# Patient Record
Sex: MALE | Race: WHITE | NOT HISPANIC OR LATINO | Employment: OTHER | URBAN - METROPOLITAN AREA
[De-identification: names, ages, dates, MRNs, and addresses within clinical notes are randomized per-mention and may not be internally consistent; named-entity substitution may affect disease eponyms.]

---

## 2017-06-12 ENCOUNTER — HOSPITAL ENCOUNTER (OUTPATIENT)
Dept: PAIN MEDICINE | Facility: HOSPITAL | Age: 72
Discharge: HOME | End: 2017-06-12
Attending: PAIN MEDICINE | Admitting: PAIN MEDICINE

## 2017-06-26 ENCOUNTER — HOSPITAL ENCOUNTER (OUTPATIENT)
Dept: PAIN MEDICINE | Facility: HOSPITAL | Age: 72
Discharge: HOME | End: 2017-06-26
Attending: PAIN MEDICINE | Admitting: PAIN MEDICINE

## 2017-10-16 ENCOUNTER — HOSPITAL ENCOUNTER (OUTPATIENT)
Dept: PAIN MEDICINE | Facility: HOSPITAL | Age: 72
Discharge: HOME | End: 2017-10-16
Attending: PAIN MEDICINE | Admitting: PAIN MEDICINE

## 2018-01-16 ENCOUNTER — ON CAMPUS - OUTPATIENT (OUTPATIENT)
Dept: URBAN - METROPOLITAN AREA HOSPITAL 85 | Facility: HOSPITAL | Age: 73
End: 2018-01-16

## 2018-01-16 DIAGNOSIS — K29.60 OTHER GASTRITIS WITHOUT BLEEDING: ICD-10-CM

## 2018-01-16 DIAGNOSIS — R06.00 DYSPNEA, UNSPECIFIED: ICD-10-CM

## 2018-01-16 DIAGNOSIS — R07.9 CHEST PAIN, UNSPECIFIED: ICD-10-CM

## 2018-01-16 DIAGNOSIS — K44.9 DIAPHRAGMATIC HERNIA WITHOUT OBSTRUCTION OR GANGRENE: ICD-10-CM

## 2018-01-16 PROCEDURE — 43239 EGD BIOPSY SINGLE/MULTIPLE: CPT | Performed by: INTERNAL MEDICINE

## 2019-01-24 ENCOUNTER — OFFICE (OUTPATIENT)
Dept: URBAN - METROPOLITAN AREA CLINIC 64 | Facility: CLINIC | Age: 74
End: 2019-01-24

## 2019-01-24 VITALS
WEIGHT: 238 LBS | HEART RATE: 52 BPM | DIASTOLIC BLOOD PRESSURE: 81 MMHG | SYSTOLIC BLOOD PRESSURE: 131 MMHG | HEIGHT: 72 IN

## 2019-01-24 DIAGNOSIS — K44.9 DIAPHRAGMATIC HERNIA WITHOUT OBSTRUCTION OR GANGRENE: ICD-10-CM

## 2019-01-24 DIAGNOSIS — Z79.02 LONG TERM (CURRENT) USE OF ANTITHROMBOTICS/ANTIPLATELETS: ICD-10-CM

## 2019-01-24 DIAGNOSIS — Z12.11 ENCOUNTER FOR SCREENING FOR MALIGNANT NEOPLASM OF COLON: ICD-10-CM

## 2019-01-24 DIAGNOSIS — K21.9 GASTRO-ESOPHAGEAL REFLUX DISEASE WITHOUT ESOPHAGITIS: ICD-10-CM

## 2019-01-24 PROCEDURE — 99214 OFFICE O/P EST MOD 30 MIN: CPT | Performed by: NURSE PRACTITIONER

## 2019-01-24 RX ORDER — PANTOPRAZOLE SODIUM 20 MG/1
20 TABLET, DELAYED RELEASE ORAL
Qty: 90 | Refills: 3 | Status: COMPLETED
Start: 2019-01-24 | End: 2021-03-08

## 2019-01-24 RX ORDER — RANITIDINE HYDROCHLORIDE 300 MG/1
300 TABLET, FILM COATED ORAL
Qty: 30 | Refills: 11 | Status: COMPLETED
Start: 2019-01-24 | End: 2019-06-05

## 2019-03-06 ENCOUNTER — OFFICE (OUTPATIENT)
Dept: URBAN - METROPOLITAN AREA CLINIC 64 | Facility: CLINIC | Age: 74
End: 2019-03-06

## 2019-03-06 VITALS
DIASTOLIC BLOOD PRESSURE: 89 MMHG | HEART RATE: 68 BPM | WEIGHT: 242 LBS | HEIGHT: 72 IN | SYSTOLIC BLOOD PRESSURE: 160 MMHG

## 2019-03-06 DIAGNOSIS — Z79.02 LONG TERM (CURRENT) USE OF ANTITHROMBOTICS/ANTIPLATELETS: ICD-10-CM

## 2019-03-06 DIAGNOSIS — K21.9 GASTRO-ESOPHAGEAL REFLUX DISEASE WITHOUT ESOPHAGITIS: ICD-10-CM

## 2019-03-06 DIAGNOSIS — Z12.11 ENCOUNTER FOR SCREENING FOR MALIGNANT NEOPLASM OF COLON: ICD-10-CM

## 2019-03-06 PROCEDURE — 99213 OFFICE O/P EST LOW 20 MIN: CPT | Performed by: NURSE PRACTITIONER

## 2019-05-14 ENCOUNTER — ON CAMPUS - OUTPATIENT (OUTPATIENT)
Dept: URBAN - METROPOLITAN AREA HOSPITAL 85 | Facility: HOSPITAL | Age: 74
End: 2019-05-14

## 2019-05-14 ENCOUNTER — HOSPITAL ENCOUNTER (OUTPATIENT)
Dept: GASTROENTEROLOGY | Facility: HOSPITAL | Age: 74
Setting detail: HOSPITAL OUTPATIENT SURGERY
Discharge: HOME | End: 2019-05-14
Attending: INTERNAL MEDICINE | Admitting: INTERNAL MEDICINE

## 2019-05-14 DIAGNOSIS — D12.2 BENIGN NEOPLASM OF ASCENDING COLON: ICD-10-CM

## 2019-05-14 DIAGNOSIS — R19.5 OTHER FECAL ABNORMALITIES: ICD-10-CM

## 2019-05-14 DIAGNOSIS — K57.30 DIVERTICULOSIS OF LARGE INTESTINE WITHOUT PERFORATION OR ABS: ICD-10-CM

## 2019-05-14 DIAGNOSIS — D12.3 BENIGN NEOPLASM OF TRANSVERSE COLON: ICD-10-CM

## 2019-05-14 DIAGNOSIS — K64.0 FIRST DEGREE HEMORRHOIDS: ICD-10-CM

## 2019-05-14 PROCEDURE — 45380 COLONOSCOPY AND BIOPSY: CPT | Mod: 59 | Performed by: INTERNAL MEDICINE

## 2019-05-14 PROCEDURE — 45385 COLONOSCOPY W/LESION REMOVAL: CPT | Performed by: INTERNAL MEDICINE

## 2019-06-05 ENCOUNTER — OFFICE (OUTPATIENT)
Dept: URBAN - METROPOLITAN AREA CLINIC 64 | Facility: CLINIC | Age: 74
End: 2019-06-05

## 2019-06-05 VITALS
SYSTOLIC BLOOD PRESSURE: 128 MMHG | HEART RATE: 49 BPM | DIASTOLIC BLOOD PRESSURE: 77 MMHG | HEIGHT: 72 IN | WEIGHT: 240 LBS

## 2019-06-05 DIAGNOSIS — Z86.010 PERSONAL HISTORY OF COLONIC POLYPS: ICD-10-CM

## 2019-06-05 DIAGNOSIS — K21.9 GASTRO-ESOPHAGEAL REFLUX DISEASE WITHOUT ESOPHAGITIS: ICD-10-CM

## 2019-06-05 PROCEDURE — 99213 OFFICE O/P EST LOW 20 MIN: CPT | Performed by: NURSE PRACTITIONER

## 2019-06-05 RX ORDER — RANITIDINE HYDROCHLORIDE 300 MG/1
300 TABLET, FILM COATED ORAL
Qty: 30 | Refills: 11 | Status: COMPLETED
Start: 2019-01-24 | End: 2019-06-05

## 2019-06-17 ENCOUNTER — HOSPITAL ENCOUNTER (OUTPATIENT)
Facility: HOSPITAL | Age: 74
Setting detail: OBSERVATION
Discharge: HOME | End: 2019-06-18
Attending: PHYSICIAN ASSISTANT | Admitting: HOSPITALIST

## 2019-06-17 ENCOUNTER — HOSPITAL ENCOUNTER (EMERGENCY)
Dept: GENERAL RADIOLOGY | Facility: HOSPITAL | Age: 74
Discharge: HOME | End: 2019-06-17

## 2019-06-17 DIAGNOSIS — R07.9 CHEST PAIN, UNSPECIFIED TYPE: Primary | ICD-10-CM

## 2019-06-17 LAB
ALBUMIN SERPL-MCNC: 4 G/DL (ref 3.5–4.8)
ALBUMIN/GLOB SERPL: 1.2 G/DL (ref 1–1.7)
ALP SERPL-CCNC: 61 U/L (ref 32–91)
ALT SERPL W P-5'-P-CCNC: 19 U/L (ref 17–63)
ANION GAP SERPL CALCULATED.3IONS-SCNC: 11 MMOL/L (ref 10–20)
AST SERPL-CCNC: 27 U/L (ref 15–41)
BASOPHILS # BLD AUTO: 0.1 10*3/MM3 (ref 0–0.2)
BASOPHILS NFR BLD AUTO: 1.6 % (ref 0–1.5)
BILIRUB SERPL-MCNC: 0.8 MG/DL (ref 0.3–1.2)
BUN BLD-MCNC: 12 MG/DL (ref 8–20)
BUN/CREAT SERPL: 12 (ref 6.2–20.3)
CALCIUM SPEC-SCNC: 8.8 MG/DL (ref 8.9–10.3)
CHLORIDE SERPL-SCNC: 106 MMOL/L (ref 101–111)
CO2 SERPL-SCNC: 21 MMOL/L (ref 22–32)
CREAT BLD-MCNC: 1 MG/DL (ref 0.7–1.2)
DEPRECATED RDW RBC AUTO: 43.3 FL (ref 37–54)
EOSINOPHIL # BLD AUTO: 0.2 10*3/MM3 (ref 0–0.4)
EOSINOPHIL NFR BLD AUTO: 4.8 % (ref 0.3–6.2)
ERYTHROCYTE [DISTWIDTH] IN BLOOD BY AUTOMATED COUNT: 13.8 % (ref 12.3–15.4)
GFR SERPL CREATININE-BSD FRML MDRD: 74 ML/MIN/1.73
GLOBULIN UR ELPH-MCNC: 3.3 GM/DL (ref 2.5–3.8)
GLUCOSE BLD-MCNC: 94 MG/DL (ref 65–99)
HCT VFR BLD AUTO: 49.7 % (ref 37.5–51)
HGB BLD-MCNC: 16.4 G/DL (ref 13–17.7)
HOLD SPECIMEN: NORMAL
HOLD SPECIMEN: NORMAL
LYMPHOCYTES # BLD AUTO: 1.3 10*3/MM3 (ref 0.7–3.1)
LYMPHOCYTES NFR BLD AUTO: 30.1 % (ref 19.6–45.3)
MCH RBC QN AUTO: 30.1 PG (ref 26.6–33)
MCHC RBC AUTO-ENTMCNC: 33.1 G/DL (ref 31.5–35.7)
MCV RBC AUTO: 91.1 FL (ref 79–97)
MONOCYTES # BLD AUTO: 0.5 10*3/MM3 (ref 0.1–0.9)
MONOCYTES NFR BLD AUTO: 12.3 % (ref 5–12)
NEUTROPHILS # BLD AUTO: 2.3 10*3/MM3 (ref 1.7–7)
NEUTROPHILS NFR BLD AUTO: 51.2 % (ref 42.7–76)
NRBC BLD AUTO-RTO: 0.2 /100 WBC (ref 0–0.2)
PLATELET # BLD AUTO: 178 10*3/MM3 (ref 140–450)
PMV BLD AUTO: 9.4 FL (ref 6–12)
POTASSIUM BLD-SCNC: 4.4 MMOL/L (ref 3.6–5.1)
PROT SERPL-MCNC: 7.3 G/DL (ref 6.1–7.9)
RBC # BLD AUTO: 5.46 10*6/MM3 (ref 4.14–5.8)
SODIUM BLD-SCNC: 138 MMOL/L (ref 136–144)
TROPONIN I SERPL-MCNC: <0.03 NG/ML (ref 0–0.03)
TROPONIN I SERPL-MCNC: <0.03 NG/ML (ref 0–0.03)
WBC NRBC COR # BLD: 4.4 10*3/MM3 (ref 3.4–10.8)
WHOLE BLOOD HOLD SPECIMEN: NORMAL
WHOLE BLOOD HOLD SPECIMEN: NORMAL

## 2019-06-17 PROCEDURE — 84484 ASSAY OF TROPONIN QUANT: CPT | Performed by: PHYSICIAN ASSISTANT

## 2019-06-17 PROCEDURE — 25010000002 ENOXAPARIN PER 10 MG: Performed by: NURSE PRACTITIONER

## 2019-06-17 PROCEDURE — G0378 HOSPITAL OBSERVATION PER HR: HCPCS

## 2019-06-17 PROCEDURE — 96372 THER/PROPH/DIAG INJ SC/IM: CPT

## 2019-06-17 PROCEDURE — 93005 ELECTROCARDIOGRAM TRACING: CPT

## 2019-06-17 PROCEDURE — 99219 PR INITIAL OBSERVATION CARE/DAY 50 MINUTES: CPT | Performed by: NURSE PRACTITIONER

## 2019-06-17 PROCEDURE — 93005 ELECTROCARDIOGRAM TRACING: CPT | Performed by: PHYSICIAN ASSISTANT

## 2019-06-17 PROCEDURE — 99284 EMERGENCY DEPT VISIT MOD MDM: CPT

## 2019-06-17 PROCEDURE — 85025 COMPLETE CBC W/AUTO DIFF WBC: CPT | Performed by: PHYSICIAN ASSISTANT

## 2019-06-17 PROCEDURE — 80053 COMPREHEN METABOLIC PANEL: CPT | Performed by: PHYSICIAN ASSISTANT

## 2019-06-17 PROCEDURE — 71045 X-RAY EXAM CHEST 1 VIEW: CPT

## 2019-06-17 RX ORDER — LISINOPRIL 5 MG/1
TABLET ORAL EVERY 24 HOURS
COMMUNITY
Start: 2018-05-31 | End: 2019-10-09 | Stop reason: SDUPTHER

## 2019-06-17 RX ORDER — ASPIRIN 81 MG/1
81 TABLET ORAL DAILY
Status: DISCONTINUED | OUTPATIENT
Start: 2019-06-18 | End: 2019-06-18

## 2019-06-17 RX ORDER — FAMOTIDINE 20 MG/1
TABLET, FILM COATED ORAL EVERY 24 HOURS
COMMUNITY
Start: 2018-07-05 | End: 2019-06-17

## 2019-06-17 RX ORDER — ACETAMINOPHEN,DIPHENHYDRAMINE HCL 500; 25 MG/1; MG/1
2 TABLET, FILM COATED ORAL NIGHTLY
COMMUNITY
Start: 2014-01-10

## 2019-06-17 RX ORDER — SODIUM CHLORIDE 0.9 % (FLUSH) 0.9 %
3 SYRINGE (ML) INJECTION EVERY 12 HOURS SCHEDULED
Status: DISCONTINUED | OUTPATIENT
Start: 2019-06-17 | End: 2019-06-18 | Stop reason: HOSPADM

## 2019-06-17 RX ORDER — ROSUVASTATIN CALCIUM 40 MG/1
TABLET, COATED ORAL NIGHTLY
COMMUNITY
Start: 2017-10-10 | End: 2019-10-21 | Stop reason: SDUPTHER

## 2019-06-17 RX ORDER — SODIUM CHLORIDE 0.9 % (FLUSH) 0.9 %
3-10 SYRINGE (ML) INJECTION AS NEEDED
Status: DISCONTINUED | OUTPATIENT
Start: 2019-06-17 | End: 2019-06-18 | Stop reason: HOSPADM

## 2019-06-17 RX ORDER — FLUTICASONE PROPIONATE 50 MCG
SPRAY, SUSPENSION (ML) NASAL
COMMUNITY
Start: 2012-06-04 | End: 2019-06-17

## 2019-06-17 RX ORDER — FLUTICASONE PROPIONATE 50 MCG
2 SPRAY, SUSPENSION (ML) NASAL DAILY PRN
COMMUNITY

## 2019-06-17 RX ORDER — RANITIDINE 300 MG/1
TABLET ORAL
Refills: 11 | COMMUNITY
Start: 2019-04-28 | End: 2019-11-25

## 2019-06-17 RX ORDER — SODIUM CHLORIDE 0.9 % (FLUSH) 0.9 %
10 SYRINGE (ML) INJECTION AS NEEDED
Status: DISCONTINUED | OUTPATIENT
Start: 2019-06-17 | End: 2019-06-18 | Stop reason: HOSPADM

## 2019-06-17 RX ORDER — ASPIRIN 81 MG/1
324 TABLET, CHEWABLE ORAL ONCE
Status: COMPLETED | OUTPATIENT
Start: 2019-06-17 | End: 2019-06-17

## 2019-06-17 RX ORDER — ISOSORBIDE MONONITRATE 30 MG/1
TABLET, EXTENDED RELEASE ORAL
COMMUNITY
Start: 2014-01-10 | End: 2019-09-02 | Stop reason: SDUPTHER

## 2019-06-17 RX ORDER — PANTOPRAZOLE SODIUM 40 MG/1
20 TABLET, DELAYED RELEASE ORAL 2 TIMES DAILY
Refills: 11 | COMMUNITY
Start: 2019-04-28 | End: 2022-02-01

## 2019-06-17 RX ORDER — CLOPIDOGREL BISULFATE 75 MG/1
TABLET ORAL EVERY 24 HOURS
COMMUNITY
Start: 2018-02-03 | End: 2019-07-25 | Stop reason: SDUPTHER

## 2019-06-17 RX ADMIN — ENOXAPARIN SODIUM 40 MG: 40 INJECTION SUBCUTANEOUS at 23:15

## 2019-06-17 RX ADMIN — Medication 3 ML: at 23:11

## 2019-06-17 RX ADMIN — ASPIRIN 81 MG 324 MG: 81 TABLET ORAL at 17:30

## 2019-06-17 RX ADMIN — Medication 10 ML: at 17:20

## 2019-06-17 NOTE — H&P
[facility] HOSPITALIST     Rama Quintanilla APRN    CHIEF COMPLAINT: Chest pain        HISTORY OF PRESENT ILLNESS:    Chest Pain    This is a recurrent problem. The current episode started today. The onset quality is sudden. The problem occurs constantly. The problem has been resolved. The pain is present in the substernal region. The pain is at a severity of 6/10. The quality of the pain is described as dull. The pain radiates to the left shoulder. Pertinent negatives include no abdominal pain, cough, fever, nausea, palpitations or shortness of breath. The pain is aggravated by nothing. He has tried nothing for the symptoms. Risk factors include male gender.   His past medical history is significant for CAD.   His family medical history is significant for CAD.       73 year old male presents to the ER with a CC of substernal chest pain which radiated to the left arm while he was sitting watching television.  The pain was moderate in intensity with radiation down the left arm.  There was no associated nausea, SOB, or diaphoresis.  The patient had been having intermittent epigastric pain over the last 2 days but it was attributed to known hiatal hernia and he did not seek medical attention at that time.  He has know CAD with stent placement in the remote past x 2.  Last stress myoview in January 2018, was abnormal and he had a subsequent cardiac cath showing 50% disease.  No intervention was done at that time and further testing found the patients hernia.  The patient is pain free at time of interview.      Past Medical History:   Diagnosis Date   • Coronary artery disease    • GERD (gastroesophageal reflux disease)    • Hyperlipidemia    • Hypertension    • Myocardial infarction (CMS/HCC)      History reviewed. No pertinent surgical history.  History reviewed. No pertinent family history.  Social History     Tobacco Use   • Smoking status: Never Smoker   • Smokeless tobacco: Never Used   Substance  "Use Topics   • Alcohol use: No     Frequency: Never   • Drug use: No     Allergies:  Patient has no known allergies.      There is no immunization history on file for this patient.        REVIEW OF SYSTEMS:     Review of Systems   Constitution: Negative for chills and fever.   Cardiovascular: Positive for chest pain. Negative for palpitations.   Respiratory: Negative for cough and shortness of breath.    Gastrointestinal: Negative for abdominal pain and nausea.   All other systems reviewed and are negative.         Vital Signs  Temp:  [97.9 °F (36.6 °C)-98.2 °F (36.8 °C)] 98.2 °F (36.8 °C)  Heart Rate:  [46-55] 47  Resp:  [13-16] 13  BP: (125-172)/(84-92) 142/84    Flowsheet Rows      First Filed Value   Admission Height  182.9 cm (72\") Documented at 06/17/2019 1445   Admission Weight  107 kg (235 lb 0.2 oz) Documented at 06/17/2019 1445           Physical Exam:    Physical Exam   Constitutional: He appears well-developed.   Eyes: Pupils are equal, round, and reactive to light.   Neck: Normal range of motion. No JVD present.   Cardiovascular: Regular rhythm.   Pulmonary/Chest: Breath sounds normal. No respiratory distress.   Abdominal: Soft. Bowel sounds are normal.   Musculoskeletal: He exhibits no edema.   Skin: Capillary refill takes less than 2 seconds.   Psychiatric: He has a normal mood and affect.   Vitals reviewed.      Emotional Behavior:    Cooperative   Debilities: none   Age appropriate  Yes     Results Review:    I reviewed the patient's new clinical results.  Lab Results (most recent)     Procedure Component Value Units Date/Time    Ethan Draw [[id number]] Collected:  06/17/19 1721    Specimen:  Blood Updated:  06/17/19 1830    Narrative:       The following orders were created for panel order Ethan Draw.  Procedure                               Abnormality         Status                     ---------                               -----------         ------                     Light Blue " Top[[id number]]                                   Final result               Green Top (Gel)[[id number]]                                  Final result               Lavender Top[[id number]]                                     Final result               Gold Top - SST[[id number]]                                   Final result                 Please view results for these tests on the individual orders.    Lavender Top [[id number]] Collected:  06/17/19 1721    Specimen:  Blood Updated:  06/17/19 1830     Extra Tube hold for add-on     Comment: Auto resulted       Light Blue Top [[id number]] Collected:  06/17/19 1721    Specimen:  Blood Updated:  06/17/19 1830     Extra Tube hold for add-on     Comment: Auto resulted       Green Top (Gel) [[id number]] Collected:  06/17/19 1721    Specimen:  Blood Updated:  06/17/19 1830     Extra Tube Hold for add-ons.     Comment: Auto resulted.       Gold Top - SST [[id number]] Collected:  06/17/19 1721    Specimen:  Blood Updated:  06/17/19 1830     Extra Tube Hold for add-ons.     Comment: Auto resulted.       Comprehensive Metabolic Panel [[id number]]  (Abnormal) Collected:  06/17/19 1721    Specimen:  Blood Updated:  06/17/19 1819     Glucose 94 mg/dL      BUN 12 mg/dL      Creatinine 1.00 mg/dL      Sodium 138 mmol/L      Potassium 4.4 mmol/L      Chloride 106 mmol/L      CO2 21.0 mmol/L      Calcium 8.8 mg/dL      Total Protein 7.3 g/dL      Albumin 4.00 g/dL      ALT (SGPT) 19 U/L      AST (SGOT) 27 U/L      Alkaline Phosphatase 61 U/L      Total Bilirubin 0.8 mg/dL      eGFR Non African Amer 74 mL/min/1.73      Globulin 3.3 gm/dL      A/G Ratio 1.2 g/dL      BUN/Creatinine Ratio 12.0     Anion Gap 11.0 mmol/L     Narrative:       The MDRD GFR formula is only valid for adults with stable renal function between ages 18 and 70.    Troponin [[id number]]  (Normal) Collected:  06/17/19 1721    Specimen:  Blood Updated:  06/17/19 1802     Troponin I <0.030 ng/mL     Narrative:       Troponin  I Reference Range:    0.00-0.03  Negative.  Repeat testing in 4-6 hours if clinically indicated.    0.04-0.29  Suspicious for myocardial injury. Serial measurements and clinical  correlation may be necessary to confirm or exclude diagnosis of acute  coronary syndrome.  Repeat testing in 4-6 hours if indicated.     >0.29 Consistent with myocardial injury.  Recommend clinical and laboratory correlation.     Results my be falsely decreased if patient taking Biotin.     CBC & Differential [[id number]] Collected:  06/17/19 1721    Specimen:  Blood Updated:  06/17/19 1743    Narrative:       The following orders were created for panel order CBC & Differential.  Procedure                               Abnormality         Status                     ---------                               -----------         ------                     CBC Auto Differential[[id number]]        Abnormal            Final result                 Please view results for these tests on the individual orders.    CBC Auto Differential [[id number]]  (Abnormal) Collected:  06/17/19 1721    Specimen:  Blood Updated:  06/17/19 1743     WBC 4.40 10*3/mm3      RBC 5.46 10*6/mm3      Hemoglobin 16.4 g/dL      Hematocrit 49.7 %      MCV 91.1 fL      MCH 30.1 pg      MCHC 33.1 g/dL      RDW 13.8 %      RDW-SD 43.3 fl      MPV 9.4 fL      Platelets 178 10*3/mm3      Neutrophil % 51.2 %      Lymphocyte % 30.1 %      Monocyte % 12.3 %      Eosinophil % 4.8 %      Basophil % 1.6 %      Neutrophils, Absolute 2.30 10*3/mm3      Lymphocytes, Absolute 1.30 10*3/mm3      Monocytes, Absolute 0.50 10*3/mm3      Eosinophils, Absolute 0.20 10*3/mm3      Basophils, Absolute 0.10 10*3/mm3      nRBC 0.2 /100 WBC           Imaging Results (most recent)     Procedure Component Value Units Date/Time    XR Chest 1 View [[id number]] Collected:  06/17/19 1840     Updated:  06/17/19 1841    Narrative:       DATE OF EXAM:  6/17/2019 5:53 PM     PROCEDURE:  XR CHEST 1 VW-      INDICATIONS:  Chest pain protocol     COMPARISON:  01/23/2019     TECHNIQUE:   Single radiographic view of the ches was obtained.     FINDINGS:  Heart size is within normal. There is mild prominence of the ascending  aorta, similar to the prior.. There is no vascular congestion,  infiltrate, or pleural effusion. No bone lesion is seen.        Impression:       No evidence of active disease.     Electronically Signed ByNiki Jaime On:6/17/2019 6:41 PM  This report was finalized on [id number] by  Aranza Eduardo        reviewed    ECG/EMG Results (most recent)     Procedure Component Value Units Date/Time    ECG 12 Lead [[id number]] Collected:  06/17/19 1441     Updated:  06/17/19 1443    Narrative:       HEART RATE= 49  bpm  RR Interval= 1216  ms  ME Interval= 167  ms  P Horizontal Axis= 18  deg  P Front Axis= 10  deg  QRSD Interval= 95  ms  QT Interval= 466  ms  QRS Axis= -15  deg  T Wave Axis= 39  deg  - OTHERWISE NORMAL ECG -  Sinus bradycardia  Electronically Signed By:   Date and Time of Study: 2019-06-17 14:41:45        reviewed    XR Chest 1 View  Narrative: DATE OF EXAM:  6/17/2019 5:53 PM     PROCEDURE:  XR CHEST 1 VW-     INDICATIONS:  Chest pain protocol     COMPARISON:  01/23/2019     TECHNIQUE:   Single radiographic view of the ches was obtained.     FINDINGS:  Heart size is within normal. There is mild prominence of the ascending  aorta, similar to the prior.. There is no vascular congestion,  infiltrate, or pleural effusion. No bone lesion is seen.      Impression: No evidence of active disease.     Electronically Signed ByNiki Jaime On:6/17/2019 6:41 PM  This report was finalized on [id number] by  Aranza Eduardo      Assessment/Plan     Patient Active Problem List   Diagnosis   • Chest pain   Chest pain, uncertain etiology; known hx CKD, known hiatal hernia:  Check serial troponin; stress Myoview in a.m.    --aspirin 324 mg given in ER    CAD with history of stent x 2 in remote past:   Continue aspirin daily; hold metoprolol; plavix; continue isosorbide    --not sinus bradycardia on EKG and monitor     l  Allergies: continue Fluticasone    Hypertenison, chronic, controlled: continue metoprolol, lisinopril     HLD--contnue rosuvastatin    GERD: continue pantoprazole     Insomnia, chronic: contiue tylenol pm     DVT prophylaxis--Lovenox       Disposition     Admit to 23 hour observation     I discussed the patients findings and my recommendations with patient.     UNA Mann  06/17/19  7:34 PM    Time: 70516

## 2019-06-17 NOTE — ED PROVIDER NOTES
Subjective   73-year-old male presents with chest pain.  Patient states pain was in the left chest and radiated to the left arm.  He denies any shortness of breath, diaphoresis, dizziness, palpitations, nausea.  He denies any alleviating or exacerbating factors.  He states the pain started this afternoon.  It resolved upon arrival to the emergency room.  He is chest pain-free now.  Pain was described as moderate in intensity.  Patient does have a history of coronary artery disease with stent placement.        History provided by:  Patient      Review of Systems   Constitutional: Negative for fatigue and fever.   HENT: Negative for congestion and sore throat.    Eyes: Negative for pain and visual disturbance.   Respiratory: Negative for cough and shortness of breath.    Cardiovascular: Positive for chest pain.   Gastrointestinal: Negative for abdominal pain, diarrhea, nausea and vomiting.   Genitourinary: Negative for dysuria and frequency.   Musculoskeletal: Negative for back pain.   Skin: Negative for rash.   Neurological: Negative for dizziness and headaches.   Psychiatric/Behavioral: Negative for confusion and hallucinations.       Past Medical History:   Diagnosis Date   • Coronary artery disease    • GERD (gastroesophageal reflux disease)    • Hyperlipidemia    • Hypertension    • Myocardial infarction (CMS/HCC)        No Known Allergies    History reviewed. No pertinent surgical history.    History reviewed. No pertinent family history.    Social History     Socioeconomic History   • Marital status:      Spouse name: Not on file   • Number of children: Not on file   • Years of education: Not on file   • Highest education level: Not on file   Tobacco Use   • Smoking status: Never Smoker   • Smokeless tobacco: Never Used   Substance and Sexual Activity   • Alcohol use: No     Frequency: Never   • Drug use: No   • Sexual activity: Defer       /84 (Patient Position: Lying)   Pulse (!) 47   Temp 98.2  "°F (36.8 °C) (Oral)   Resp 13   Ht 182.9 cm (72\")   Wt 107 kg (235 lb 0.2 oz)   SpO2 99%   BMI 31.87 kg/m²       Objective   Physical Exam   Constitutional: He is oriented to person, place, and time. He appears well-developed and well-nourished.   HENT:   Head: Normocephalic and atraumatic.   Eyes: EOM are normal. Pupils are equal, round, and reactive to light.   Neck: Normal range of motion. Neck supple.   Cardiovascular: Regular rhythm. Bradycardia present.   Pulmonary/Chest: Effort normal and breath sounds normal.   Abdominal: Soft. Bowel sounds are normal.   Musculoskeletal: Normal range of motion.   Neurological: He is alert and oriented to person, place, and time.   Skin: Skin is warm and dry.   Nursing note and vitals reviewed.      Procedures           ED Course      Results for orders placed or performed during the hospital encounter of 06/17/19   Comprehensive Metabolic Panel   Result Value Ref Range    Glucose 94 65 - 99 mg/dL    BUN 12 8 - 20 mg/dL    Creatinine 1.00 0.70 - 1.20 mg/dL    Sodium 138 136 - 144 mmol/L    Potassium 4.4 3.6 - 5.1 mmol/L    Chloride 106 101 - 111 mmol/L    CO2 21.0 (L) 22.0 - 32.0 mmol/L    Calcium 8.8 (L) 8.9 - 10.3 mg/dL    Total Protein 7.3 6.1 - 7.9 g/dL    Albumin 4.00 3.50 - 4.80 g/dL    ALT (SGPT) 19 17 - 63 U/L    AST (SGOT) 27 15 - 41 U/L    Alkaline Phosphatase 61 32 - 91 U/L    Total Bilirubin 0.8 0.3 - 1.2 mg/dL    eGFR Non African Amer 74 >60 mL/min/1.73    Globulin 3.3 2.5 - 3.8 gm/dL    A/G Ratio 1.2 1.0 - 1.7 g/dL    BUN/Creatinine Ratio 12.0 6.2 - 20.3    Anion Gap 11.0 10.0 - 20.0 mmol/L   Troponin   Result Value Ref Range    Troponin I <0.030 0.000 - 0.030 ng/mL   CBC Auto Differential   Result Value Ref Range    WBC 4.40 3.40 - 10.80 10*3/mm3    RBC 5.46 4.14 - 5.80 10*6/mm3    Hemoglobin 16.4 13.0 - 17.7 g/dL    Hematocrit 49.7 37.5 - 51.0 %    MCV 91.1 79.0 - 97.0 fL    MCH 30.1 26.6 - 33.0 pg    MCHC 33.1 31.5 - 35.7 g/dL    RDW 13.8 12.3 - 15.4 % "    RDW-SD 43.3 37.0 - 54.0 fl    MPV 9.4 6.0 - 12.0 fL    Platelets 178 140 - 450 10*3/mm3    Neutrophil % 51.2 42.7 - 76.0 %    Lymphocyte % 30.1 19.6 - 45.3 %    Monocyte % 12.3 (H) 5.0 - 12.0 %    Eosinophil % 4.8 0.3 - 6.2 %    Basophil % 1.6 (H) 0.0 - 1.5 %    Neutrophils, Absolute 2.30 1.70 - 7.00 10*3/mm3    Lymphocytes, Absolute 1.30 0.70 - 3.10 10*3/mm3    Monocytes, Absolute 0.50 0.10 - 0.90 10*3/mm3    Eosinophils, Absolute 0.20 0.00 - 0.40 10*3/mm3    Basophils, Absolute 0.10 0.00 - 0.20 10*3/mm3    nRBC 0.2 0.0 - 0.2 /100 WBC   Light Blue Top   Result Value Ref Range    Extra Tube hold for add-on    Green Top (Gel)   Result Value Ref Range    Extra Tube Hold for add-ons.    Lavender Top   Result Value Ref Range    Extra Tube hold for add-on    Gold Top - SST   Result Value Ref Range    Extra Tube Hold for add-ons.      Xr Chest 1 View    Result Date: 6/17/2019  No evidence of active disease.  Electronically Signed By-Yana Jaime On:6/17/2019 6:41 PM This report was finalized on [id number] by  Yana Jaime, .      My interpretation of EKG shows sinus bradycardia, rate of 49, no ST elevation.          MDM  Patient had the above evaluation.  Results were discussed with the patient.  Patient remains well-appearing in the emergency room.  Work-up is been unremarkable.  Chest x-ray shows no acute disease.  EKG shows no acute ischemia.  Troponin is negative.  Heart rate is a little low around 50.  Patient states this is normal for him.  Blood pressure has been fine.  I discussed with the hospitalist and the patient will be admitted for further evaluation of his chest pain.    Final diagnoses:   Chest pain, unspecified type            Aman Smith MD  06/17/19 [id number]

## 2019-06-18 ENCOUNTER — HOSPITAL ENCOUNTER (OUTPATIENT)
Dept: NUCLEAR MEDICINE | Facility: HOSPITAL | Age: 74
Setting detail: OBSERVATION
Discharge: HOME | End: 2019-06-18

## 2019-06-18 VITALS
RESPIRATION RATE: 18 BRPM | SYSTOLIC BLOOD PRESSURE: 143 MMHG | TEMPERATURE: 98.3 F | HEIGHT: 72 IN | OXYGEN SATURATION: 98 % | BODY MASS INDEX: 31.83 KG/M2 | HEART RATE: 49 BPM | WEIGHT: 235.01 LBS | DIASTOLIC BLOOD PRESSURE: 81 MMHG

## 2019-06-18 LAB
ANION GAP SERPL CALCULATED.3IONS-SCNC: 9 MMOL/L (ref 10–20)
BUN BLD-MCNC: 12 MG/DL (ref 8–20)
BUN/CREAT SERPL: 13.3 (ref 6.2–20.3)
CALCIUM SPEC-SCNC: 8.6 MG/DL (ref 8.9–10.3)
CHLORIDE SERPL-SCNC: 106 MMOL/L (ref 101–111)
CO2 SERPL-SCNC: 23 MMOL/L (ref 22–32)
CREAT BLD-MCNC: 0.9 MG/DL (ref 0.7–1.2)
GFR SERPL CREATININE-BSD FRML MDRD: 83 ML/MIN/1.73
GLUCOSE BLD-MCNC: 121 MG/DL (ref 65–99)
POTASSIUM BLD-SCNC: 3.7 MMOL/L (ref 3.6–5.1)
SODIUM BLD-SCNC: 138 MMOL/L (ref 136–144)

## 2019-06-18 PROCEDURE — 93016 CV STRESS TEST SUPVJ ONLY: CPT | Performed by: NURSE PRACTITIONER

## 2019-06-18 PROCEDURE — 99217 PR OBSERVATION CARE DISCHARGE MANAGEMENT: CPT | Performed by: HOSPITALIST

## 2019-06-18 PROCEDURE — 93017 CV STRESS TEST TRACING ONLY: CPT

## 2019-06-18 PROCEDURE — G0378 HOSPITAL OBSERVATION PER HR: HCPCS

## 2019-06-18 PROCEDURE — 80048 BASIC METABOLIC PNL TOTAL CA: CPT | Performed by: NURSE PRACTITIONER

## 2019-06-18 PROCEDURE — A9500 TC99M SESTAMIBI: HCPCS | Performed by: HOSPITALIST

## 2019-06-18 PROCEDURE — 25010000002 REGADENOSON 0.4 MG/5ML SOLUTION: Performed by: HOSPITALIST

## 2019-06-18 PROCEDURE — 78452 HT MUSCLE IMAGE SPECT MULT: CPT

## 2019-06-18 PROCEDURE — 0 TECHNETIUM SESTAMIBI: Performed by: HOSPITALIST

## 2019-06-18 RX ORDER — PANTOPRAZOLE SODIUM 40 MG/1
40 TABLET, DELAYED RELEASE ORAL
Status: DISCONTINUED | OUTPATIENT
Start: 2019-06-18 | End: 2019-06-18 | Stop reason: HOSPADM

## 2019-06-18 RX ORDER — FLUTICASONE PROPIONATE 50 MCG
2 SPRAY, SUSPENSION (ML) NASAL DAILY PRN
Status: DISCONTINUED | OUTPATIENT
Start: 2019-06-18 | End: 2019-06-18 | Stop reason: HOSPADM

## 2019-06-18 RX ORDER — LISINOPRIL 5 MG/1
5 TABLET ORAL
Status: DISCONTINUED | OUTPATIENT
Start: 2019-06-18 | End: 2019-06-18 | Stop reason: HOSPADM

## 2019-06-18 RX ORDER — ASPIRIN 81 MG/1
81 TABLET, CHEWABLE ORAL DAILY
Status: DISCONTINUED | OUTPATIENT
Start: 2019-06-18 | End: 2019-06-18 | Stop reason: HOSPADM

## 2019-06-18 RX ORDER — FAMOTIDINE 20 MG/1
40 TABLET, FILM COATED ORAL DAILY
Status: DISCONTINUED | OUTPATIENT
Start: 2019-06-18 | End: 2019-06-18 | Stop reason: HOSPADM

## 2019-06-18 RX ORDER — CLOPIDOGREL BISULFATE 75 MG/1
75 TABLET ORAL DAILY
Status: DISCONTINUED | OUTPATIENT
Start: 2019-06-18 | End: 2019-06-18 | Stop reason: HOSPADM

## 2019-06-18 RX ADMIN — CLOPIDOGREL BISULFATE 75 MG: 75 TABLET ORAL at 08:44

## 2019-06-18 RX ADMIN — REGADENOSON 7.3 MG: 0.08 INJECTION, SOLUTION INTRAVENOUS at 14:00

## 2019-06-18 RX ADMIN — PANTOPRAZOLE SODIUM 40 MG: 40 TABLET, DELAYED RELEASE ORAL at 05:06

## 2019-06-18 RX ADMIN — TECHNETIUM TC 99M SESTAMIBI 1 DOSE: 1 INJECTION INTRAVENOUS at 14:00

## 2019-06-18 RX ADMIN — LISINOPRIL 5 MG: 5 TABLET ORAL at 08:44

## 2019-06-18 RX ADMIN — Medication 3 ML: at 08:46

## 2019-06-18 RX ADMIN — TECHNETIUM TC 99M SESTAMIBI 1 DOSE: 1 INJECTION INTRAVENOUS at 11:45

## 2019-06-18 RX ADMIN — FAMOTIDINE 40 MG: 20 TABLET, FILM COATED ORAL at 08:45

## 2019-06-18 RX ADMIN — ASPIRIN 81 MG 81 MG: 81 TABLET ORAL at 08:44

## 2019-06-18 NOTE — DISCHARGE SUMMARY
Date of Admission: 6/17/2019    Date of Discharge:  6/18/2019    Length of stay:  LOS: 0 days       Principal diagnosis    Chest pain atypical  Coronary artery disease  Hypertension  Dyslipidemia.    Active Hospital Problems    Diagnosis  POA   • Chest pain [R07.9]  Yes      Resolved Hospital Problems   No resolved problems to display.          Hospital Course  Patient is a 73 y.o. male presented with chest pain, underwent EKG and serial cardiac markers that came on negative.  Following this patient underwent a stress test which came out negative for ischemia. Now patient will be discharge home in a stable condition to follow with his family physician in a week time.    Past Medical History:     Past Medical History:   Diagnosis Date   • Coronary artery disease    • GERD (gastroesophageal reflux disease)    • Hyperlipidemia    • Hypertension    • Myocardial infarction (CMS/HCC)        Past Surgical History:     Past Surgical History:   Procedure Laterality Date   • COLONOSCOPY         Social History:   Social History     Socioeconomic History   • Marital status:      Spouse name: Not on file   • Number of children: Not on file   • Years of education: Not on file   • Highest education level: Not on file   Tobacco Use   • Smoking status: Never Smoker   • Smokeless tobacco: Never Used   Substance and Sexual Activity   • Alcohol use: No     Frequency: Never   • Drug use: No   • Sexual activity: Defer       Procedures Performed         Consults:   Consults     Date and Time Order Name Status Description    6/17/2019 1853 Hospitalist (on-call MD unless specified) Completed           Pertinent Test Results:     Lab Results (last 72 hours)     Procedure Component Value Units Date/Time    Basic Metabolic Panel [[id number]]  (Abnormal) Collected:  06/18/19 0437    Specimen:  Blood Updated:  06/18/19 0608     Glucose 121 mg/dL      BUN 12 mg/dL      Creatinine 0.90 mg/dL      Sodium 138 mmol/L      Potassium 3.7 mmol/L       Comment: Specimen hemolyzed.  Results may be affected.        Chloride 106 mmol/L      CO2 23.0 mmol/L      Calcium 8.6 mg/dL      eGFR Non African Amer 83 mL/min/1.73      BUN/Creatinine Ratio 13.3     Anion Gap 9.0 mmol/L     Narrative:       The MDRD GFR formula is only valid for adults with stable renal function between ages 18 and 70.    Troponin [[id number]]  (Normal) Collected:  06/17/19 2207    Specimen:  Blood Updated:  06/17/19 2252     Troponin I <0.030 ng/mL     Narrative:       Troponin I Reference Range:    0.00-0.03  Negative.  Repeat testing in 4-6 hours if clinically indicated.    0.04-0.29  Suspicious for myocardial injury. Serial measurements and clinical  correlation may be necessary to confirm or exclude diagnosis of acute  coronary syndrome.  Repeat testing in 4-6 hours if indicated.     >0.29 Consistent with myocardial injury.  Recommend clinical and laboratory correlation.     Results my be falsely decreased if patient taking Biotin.     Nunnelly Draw [[id number]] Collected:  06/17/19 1721    Specimen:  Blood Updated:  06/17/19 1830    Narrative:       The following orders were created for panel order Nunnelly Draw.  Procedure                               Abnormality         Status                     ---------                               -----------         ------                     Light Blue Top[[id number]]                                   Final result               Green Top (Gel)[[id number]]                                  Final result               Lavender Top[[id number]]                                     Final result               Gold Top - SST[[id number]]                                   Final result                 Please view results for these tests on the individual orders.    Lavender Top [[id number]] Collected:  06/17/19 1721    Specimen:  Blood Updated:  06/17/19 1830     Extra Tube hold for add-on     Comment: Auto resulted       Light Blue Top [[id number]] Collected:   06/17/19 1721    Specimen:  Blood Updated:  06/17/19 1830     Extra Tube hold for add-on     Comment: Auto resulted       Green Top (Gel) [[id number]] Collected:  06/17/19 1721    Specimen:  Blood Updated:  06/17/19 1830     Extra Tube Hold for add-ons.     Comment: Auto resulted.       Gold Top - SST [[id number]] Collected:  06/17/19 1721    Specimen:  Blood Updated:  06/17/19 1830     Extra Tube Hold for add-ons.     Comment: Auto resulted.       Comprehensive Metabolic Panel [[id number]]  (Abnormal) Collected:  06/17/19 1721    Specimen:  Blood Updated:  06/17/19 1819     Glucose 94 mg/dL      BUN 12 mg/dL      Creatinine 1.00 mg/dL      Sodium 138 mmol/L      Potassium 4.4 mmol/L      Chloride 106 mmol/L      CO2 21.0 mmol/L      Calcium 8.8 mg/dL      Total Protein 7.3 g/dL      Albumin 4.00 g/dL      ALT (SGPT) 19 U/L      AST (SGOT) 27 U/L      Alkaline Phosphatase 61 U/L      Total Bilirubin 0.8 mg/dL      eGFR Non African Amer 74 mL/min/1.73      Globulin 3.3 gm/dL      A/G Ratio 1.2 g/dL      BUN/Creatinine Ratio 12.0     Anion Gap 11.0 mmol/L     Narrative:       The MDRD GFR formula is only valid for adults with stable renal function between ages 18 and 70.    Troponin [[id number]]  (Normal) Collected:  06/17/19 1721    Specimen:  Blood Updated:  06/17/19 1802     Troponin I <0.030 ng/mL     Narrative:       Troponin I Reference Range:    0.00-0.03  Negative.  Repeat testing in 4-6 hours if clinically indicated.    0.04-0.29  Suspicious for myocardial injury. Serial measurements and clinical  correlation may be necessary to confirm or exclude diagnosis of acute  coronary syndrome.  Repeat testing in 4-6 hours if indicated.     >0.29 Consistent with myocardial injury.  Recommend clinical and laboratory correlation.     Results my be falsely decreased if patient taking Biotin.     CBC & Differential [[id number]] Collected:  06/17/19 1721    Specimen:  Blood Updated:  06/17/19 1743    Narrative:       The  following orders were created for panel order CBC & Differential.  Procedure                               Abnormality         Status                     ---------                               -----------         ------                     CBC Auto Differential[[id number]]        Abnormal            Final result                 Please view results for these tests on the individual orders.    CBC Auto Differential [[id number]]  (Abnormal) Collected:  06/17/19 1721    Specimen:  Blood Updated:  06/17/19 1743     WBC 4.40 10*3/mm3      RBC 5.46 10*6/mm3      Hemoglobin 16.4 g/dL      Hematocrit 49.7 %      MCV 91.1 fL      MCH 30.1 pg      MCHC 33.1 g/dL      RDW 13.8 %      RDW-SD 43.3 fl      MPV 9.4 fL      Platelets 178 10*3/mm3      Neutrophil % 51.2 %      Lymphocyte % 30.1 %      Monocyte % 12.3 %      Eosinophil % 4.8 %      Basophil % 1.6 %      Neutrophils, Absolute 2.30 10*3/mm3      Lymphocytes, Absolute 1.30 10*3/mm3      Monocytes, Absolute 0.50 10*3/mm3      Eosinophils, Absolute 0.20 10*3/mm3      Basophils, Absolute 0.10 10*3/mm3      nRBC 0.2 /100 WBC              No results found for: BLOODCX   No results found for: URINECX   No results found for: WOUNDCX   No results found for: RESPCX   No results found for: STOOLCX   No results found for: STOOLCXY   No results found for: MRSACX   No results found for: VRECX   No results found for: CRECX   No components found for: AFBSTAINCX   No results found for: AFBCX   No results found for: AFBCXBLD   No results found for: FUNGUSCX   No components found for: GMSSTAIN   No results found for: KOHPREP   No results found for: ANACX   No results found for: BODYFLDCX   No results found for: CSFCX   No results found for: CULTURE   No results found for: THROATCX   No results found for: THROATCXBS   No results found for: ICECX   No results found for: DICECX   No results found for: GCCX    Microbiology Results (last 10 days)     ** No results found for the last 240 hours.  **               Imaging Results (all)     Procedure Component Value Units Date/Time    XR Chest 1 View [[id number]] Collected:  06/17/19 1840     Updated:  06/17/19 1841    Narrative:       DATE OF EXAM:  6/17/2019 5:53 PM     PROCEDURE:  XR CHEST 1 VW-     INDICATIONS:  Chest pain protocol     COMPARISON:  01/23/2019     TECHNIQUE:   Single radiographic view of the ches was obtained.     FINDINGS:  Heart size is within normal. There is mild prominence of the ascending  aorta, similar to the prior.. There is no vascular congestion,  infiltrate, or pleural effusion. No bone lesion is seen.        Impression:       No evidence of active disease.     Electronically Signed By-Yana Jaime On:6/17/2019 6:41 PM  This report was finalized on [id number] by  Yana Jaime, .            Condition on Discharge:    Stable    Vital Signs  Temp:  [98.3 °F (36.8 °C)-98.6 °F (37 °C)] 98.3 °F (36.8 °C)  Heart Rate:  [47-60] 49  Resp:  [13-20] 18  BP: (122-154)/(81-90) 143/81    Physical Exam:  Physical Exam   Constitutional: He is oriented to person, place, and time. He appears well-developed and well-nourished.   HENT:   Head: Normocephalic and atraumatic.   Right Ear: External ear normal.   Left Ear: External ear normal.   Nose: Nose normal.   Eyes: Conjunctivae and EOM are normal.   Neck: Normal range of motion.   Cardiovascular: Normal rate, regular rhythm, normal heart sounds and intact distal pulses.   Pulmonary/Chest: Breath sounds normal.   Abdominal: Soft. Bowel sounds are normal.   Musculoskeletal: Normal range of motion.   Neurological: He is alert and oriented to person, place, and time.   Skin: Skin is warm and dry.   Psychiatric: He has a normal mood and affect. His behavior is normal.   Nursing note and vitals reviewed.      Discharge Diagnosis:   Patient Active Problem List   Diagnosis   • Chest pain       Discharge Disposition  Home or Self Care       Discharge Medications      Continue These Medications       Instructions Start Date   aspirin 81 MG tablet   ASPIRIN 81 MG ORAL TABLET      clopidogrel 75 MG tablet  Commonly known as:  PLAVIX   Every 24 Hours      CRESTOR 40 MG tablet  Generic drug:  rosuvastatin   Nightly      fluticasone 50 MCG/ACT nasal spray  Commonly known as:  FLONASE   2 sprays, Nasal, Daily PRN      isosorbide mononitrate 30 MG 24 hr tablet  Commonly known as:  IMDUR   ISOSORBIDE MONONITRATE ER 30 MG XR24H-TAB      lisinopril 5 MG tablet  Commonly known as:  PRINIVIL,ZESTRIL   Every 24 Hours      metoprolol tartrate 12.5 MG half tablet  Commonly known as:  LOPRESSOR   METOPROLOL TARTRATE 25 MG TABS      pantoprazole 40 MG EC tablet  Commonly known as:  PROTONIX   No dose, route, or frequency recorded.      raNITIdine 300 MG tablet  Commonly known as:  ZANTAC   No dose, route, or frequency recorded.      TYLENOL PM EXTRA STRENGTH  MG tablet per tablet  Generic drug:  diphenhydrAMINE-acetaminophen   TYLENOL PM EXTRA STRENGTH 500-25 MG TABS             Discharge Diet:   Diet Instructions     Diet: Regular, Cardiac      Discharge Diet:   Regular  Cardiac             Activity at Discharge:   Activity Instructions     Activity as Tolerated            Follow-up Appointments  No future appointments.  Additional Instructions for the Follow-ups that You Need to Schedule     Discharge Follow-up with PCP   As directed       Currently Documented PCP:    Rama Quintanilla APRN    PCP Phone Number:    [phone]     Follow Up Details:  in a week time.               Test Results Pending at Discharge       Risk for Readmission (LACE) Score: 1 (6/18/2019  6:00 AM)          Marilynn Chavez MD  06/18/19  5:48 PM

## 2019-06-19 ENCOUNTER — READMISSION MANAGEMENT (OUTPATIENT)
Dept: CALL CENTER | Facility: HOSPITAL | Age: 74
End: 2019-06-19

## 2019-06-19 LAB
BH CV NUCLEAR PRIOR STUDY: 3
LV EF NUC BP: 65 %

## 2019-06-19 PROCEDURE — 78452 HT MUSCLE IMAGE SPECT MULT: CPT | Performed by: INTERNAL MEDICINE

## 2019-06-19 PROCEDURE — 93018 CV STRESS TEST I&R ONLY: CPT | Performed by: INTERNAL MEDICINE

## 2019-06-19 NOTE — OUTREACH NOTE
Prep Survey      Responses   Facility patient discharged from?  [facility]   Is patient eligible?  Yes   Discharge diagnosis  Chest pain atypical   Does the patient have one of the following disease processes/diagnoses(primary or secondary)?  Other   Does the patient have Home health ordered?  No   Is there a DME ordered?  No   Prep survey completed?  Yes          Sunshine Winn RN

## 2019-06-19 NOTE — PROGRESS NOTES
Case Management Discharge Note            Final Discharge Disposition Code: 01 - home or self-care

## 2019-06-20 ENCOUNTER — READMISSION MANAGEMENT (OUTPATIENT)
Dept: CALL CENTER | Facility: HOSPITAL | Age: 74
End: 2019-06-20

## 2019-06-20 NOTE — OUTREACH NOTE
Medical Week 1 Survey      Responses   Facility patient discharged from?  [facility]   Does the patient have one of the following disease processes/diagnoses(primary or secondary)?  Other   Is there a successful TCM telephone encounter documented?  No   Week 1 attempt successful?  Yes [PT VOICED NO NEEDS]   Call start time  1111   Call end time  1112   Discharge diagnosis  CHEST PAIN   Medication alerts for this patient  NO NEW MEDS OR MED CHANGES   Meds reviewed with patient/caregiver?  N/A   Does the patient have all medications ordered at discharge?  N/A   Is the patient taking all medications as directed (includes completed medication regime)?  Yes [PT VOICES RESUMING HOME MEDS AS ORDERED PRIOR TO ADMISSION]   Does the patient have a primary care provider?   Yes   Does the patient have an appointment with their PCP within 7 days of discharge?  No   What is preventing the patient from scheduling follow up appointments within 7 days of discharge?  Haven't had time [PATIENT DECLINED ASSISTANCE FROM THIS NURSE IN MAKING APPOINTMENT]   Nursing Interventions  Educated patient on importance of making appointment   Has the patient kept scheduled appointments due by today?  N/A   Has home health visited the patient within 72 hours of discharge?  N/A   Did the patient receive a copy of their discharge instructions?  Yes   Nursing interventions  Reviewed instructions with patient   What is the patient's perception of their health status since discharge?  Improving   Is the patient/caregiver able to teach back signs and symptoms related to disease process for when to call PCP?  Yes   Is the patient/caregiver able to teach back signs and symptoms related to disease process for when to call 911?  Yes   Is the patient/caregiver able to teach back the hierarchy of who to call/visit for symptoms/problems? PCP, Specialist, Home health nurse, Urgent Care, ED, 911  Yes   Week 1 call completed?  Yes   Graduated  Yes   Did the patient  feel the follow up calls were helpful during their recovery period?  Yes   Was the number of calls appropriate?  Yes   Graduated/Revoked comments  PT VOICED NO NEEDS          Genoveva Ambriz LPN

## 2019-07-13 LAB
ALBUMIN SERPL-MCNC: 4.1 G/DL (ref 3.5–4.8)
ALBUMIN/GLOB SERPL: 2 {RATIO} (ref 1.2–2.2)
ALP SERPL-CCNC: 69 IU/L (ref 39–117)
ALT SERPL-CCNC: 17 IU/L (ref 0–44)
AMBIG ABBREV CMP14 DEFAULT: NORMAL
AMBIG ABBREV LP DEFAULT: NORMAL
AST SERPL-CCNC: 23 IU/L (ref 0–40)
BILIRUB SERPL-MCNC: 0.4 MG/DL (ref 0–1.2)
BUN SERPL-MCNC: 14 MG/DL (ref 8–27)
BUN/CREAT SERPL: 16 (ref 10–24)
CALCIUM SERPL-MCNC: 8.5 MG/DL (ref 8.6–10.2)
CHLORIDE SERPL-SCNC: 107 MMOL/L (ref 96–106)
CHOLEST SERPL-MCNC: 153 MG/DL (ref 100–199)
CK BB CFR SERPL ELPH: 0 %
CK MACRO1 CFR SERPL: 0 %
CK MACRO2 CFR SERPL: 0 %
CK MB CFR SERPL ELPH: 0 % (ref 0–3)
CK MM CFR SERPL ELPH: 100 % (ref 97–100)
CK SERPL-CCNC: 108 U/L (ref 24–204)
CO2 SERPL-SCNC: 21 MMOL/L (ref 20–29)
CREAT SERPL-MCNC: 0.85 MG/DL (ref 0.76–1.27)
GLOBULIN SER CALC-MCNC: 2.1 G/DL (ref 1.5–4.5)
GLUCOSE SERPL-MCNC: 98 MG/DL (ref 65–99)
HDLC SERPL-MCNC: 28 MG/DL
LDLC SERPL CALC-MCNC: 84 MG/DL (ref 0–99)
POTASSIUM SERPL-SCNC: 4.2 MMOL/L (ref 3.5–5.2)
PROT SERPL-MCNC: 6.2 G/DL (ref 6–8.5)
SODIUM SERPL-SCNC: 142 MMOL/L (ref 134–144)
TRIGL SERPL-MCNC: 204 MG/DL (ref 0–149)
VLDLC SERPL CALC-MCNC: 41 MG/DL (ref 5–40)

## 2019-07-17 NOTE — PROGRESS NOTES
"    Subjective:     Encounter Date:07/18/2019      Patient ID: Donald Navarro is a 74 y.o. male.    Chief Complaint:  History of Present Illness  74 -year-old white male patient with known history of CAD status post PCI comes back for follow up.      patient underwent PCI to LAD Previously.      recent labs showed a lipids well controlled except Elevated triglycerides,  liver enzymes within normal limit, Decreased HDL stable   patient was advised to take fish oil and aggressive diet control      patient was admit to the hospital in January 2018 a stress test showed inferolateral ischemia      January 2018 cardiac catheterization showed left main 0% stenosis proximal LAD up to 40% InStent restenoses mid to distal less than 30% disease circumflex artery less than 30% disease RCA diffuse luminal irregularities less than 30-40% disease is a   dominant artery     She was readmitted to the hospital recently underwent stress Myoview June 2019 which showed mostly fixed distal inferolateral defect  LV function normal  Patient was diagnosed with possible hiatal hernia  Advised him to continue to take current medications modify cardiac risk factors aggressively   may need repeat cardiac Catheterization if recurrent symptoms    Past Medical History:   Diagnosis Date   • Coronary artery disease    • GERD (gastroesophageal reflux disease)    • Hyperlipidemia    • Hypertension    • Myocardial infarction (CMS/HCC)      Past Surgical History:   Procedure Laterality Date   • CARDIAC CATHETERIZATION  01/2108    TIA: proximal LAD   • COLONOSCOPY     • TOTAL HIP ARTHROPLASTY Left      /89 (BP Location: Left arm, Patient Position: Sitting, Cuff Size: Adult)   Pulse 66   Ht 185.4 cm (73\")   Wt 108 kg (237 lb)   SpO2 95%   BMI 31.27 kg/m²   Family History   Problem Relation Age of Onset   • Heart disease Father        Current Outpatient Medications:   •  aspirin 81 MG tablet, ASPIRIN 81 MG ORAL TABLET, Disp: , Rfl:   •  " clopidogrel (PLAVIX) 75 MG tablet, Daily., Disp: , Rfl:   •  diphenhydrAMINE-acetaminophen (TYLENOL PM EXTRA STRENGTH)  MG tablet per tablet, TYLENOL PM EXTRA STRENGTH 500-25 MG TABS, Disp: , Rfl:   •  fluticasone (FLONASE) 50 MCG/ACT nasal spray, 2 sprays into the nostril(s) as directed by provider Daily As Needed for Rhinitis., Disp: , Rfl:   •  isosorbide mononitrate (IMDUR) 30 MG 24 hr tablet, ISOSORBIDE MONONITRATE ER 30 MG XR24H-TAB, Disp: , Rfl:   •  lisinopril (PRINIVIL,ZESTRIL) 5 MG tablet, Daily., Disp: , Rfl:   •  metoprolol tartrate (LOPRESSOR) 12.5 MG half tablet, METOPROLOL TARTRATE 25 MG TABS, Disp: , Rfl:   •  pantoprazole (PROTONIX) 40 MG EC tablet, , Disp: , Rfl: 11  •  raNITIdine (ZANTAC) 300 MG tablet, , Disp: , Rfl: 11  •  rosuvastatin (CRESTOR) 40 MG tablet, Every Night., Disp: , Rfl:   Social History     Socioeconomic History   • Marital status:      Spouse name: Not on file   • Number of children: Not on file   • Years of education: Not on file   • Highest education level: Not on file   Tobacco Use   • Smoking status: Never Smoker   • Smokeless tobacco: Never Used   Substance and Sexual Activity   • Alcohol use: No     Frequency: Never   • Drug use: No   • Sexual activity: Defer     No Known Allergies  Review of Systems   Constitution: Negative for fever and malaise/fatigue.   HENT: Negative for congestion and hearing loss.    Eyes: Negative for double vision and visual disturbance.   Cardiovascular: Positive for dyspnea on exertion. Negative for chest pain, claudication, leg swelling and syncope.   Respiratory: Negative for cough and shortness of breath.    Endocrine: Negative for cold intolerance.   Skin: Negative for color change and rash.   Musculoskeletal: Negative for arthritis and joint pain.   Gastrointestinal: Negative for abdominal pain and heartburn.   Genitourinary: Negative for hematuria.   Neurological: Negative for excessive daytime sleepiness and dizziness.    Psychiatric/Behavioral: Negative for depression. The patient is not nervous/anxious.    All other systems reviewed and are negative.             Objective:     Physical Exam   Constitutional: He is oriented to person, place, and time. He appears well-developed and well-nourished. He is cooperative.   HENT:   Head: Normocephalic and atraumatic.   Mouth/Throat: Uvula is midline and oropharynx is clear and moist. No oral lesions.   Eyes: Conjunctivae are normal. No scleral icterus.   Neck: Trachea normal. Neck supple. No JVD present. Carotid bruit is not present. No thyromegaly present.   Cardiovascular: Normal rate, regular rhythm, S1 normal, S2 normal, normal heart sounds, intact distal pulses and normal pulses. PMI is not displaced. Exam reveals no gallop and no friction rub.   No murmur heard.  Pulmonary/Chest: Effort normal and breath sounds normal.   Abdominal: Soft. Bowel sounds are normal.   Musculoskeletal: Normal range of motion.   Neurological: He is alert and oriented to person, place, and time. He has normal strength.   No focal deficits   Skin: Skin is warm. No cyanosis.   Psychiatric: He has a normal mood and affect.       Procedures    Lab Review:       Assessment:          Diagnosis Plan   1. Coronary artery disease involving native coronary artery of native heart without angina pectoris     2. Essential hypertension     3. Mixed hyperlipidemia            Plan:         History of CAD status post PCI recently admitted to hospital with recurrent symptoms of chest pain  This Myoview no significant ischemia  Continue aggressive medical treatment risk factor modification  History of hypertension stable  Dyslipidemia elevated triglycerides and low HDL stable  Advised weight loss exercise

## 2019-07-18 ENCOUNTER — OFFICE VISIT (OUTPATIENT)
Dept: CARDIOLOGY | Facility: CLINIC | Age: 74
End: 2019-07-18

## 2019-07-18 VITALS
DIASTOLIC BLOOD PRESSURE: 89 MMHG | SYSTOLIC BLOOD PRESSURE: 134 MMHG | WEIGHT: 237 LBS | HEIGHT: 73 IN | HEART RATE: 66 BPM | OXYGEN SATURATION: 95 % | BODY MASS INDEX: 31.41 KG/M2

## 2019-07-18 DIAGNOSIS — I25.10 CORONARY ARTERY DISEASE INVOLVING NATIVE CORONARY ARTERY OF NATIVE HEART WITHOUT ANGINA PECTORIS: Primary | ICD-10-CM

## 2019-07-18 DIAGNOSIS — I10 ESSENTIAL HYPERTENSION: ICD-10-CM

## 2019-07-18 DIAGNOSIS — E78.2 MIXED HYPERLIPIDEMIA: ICD-10-CM

## 2019-07-18 PROCEDURE — 99214 OFFICE O/P EST MOD 30 MIN: CPT | Performed by: INTERNAL MEDICINE

## 2019-07-26 RX ORDER — CLOPIDOGREL BISULFATE 75 MG/1
TABLET ORAL
Qty: 90 TABLET | Refills: 1 | Status: SHIPPED | OUTPATIENT
Start: 2019-07-26 | End: 2020-01-23 | Stop reason: SDUPTHER

## 2019-09-03 RX ORDER — ISOSORBIDE MONONITRATE 30 MG/1
TABLET, EXTENDED RELEASE ORAL
Qty: 90 TABLET | Refills: 3 | Status: SHIPPED | OUTPATIENT
Start: 2019-09-03 | End: 2020-03-04

## 2019-10-09 RX ORDER — LISINOPRIL 5 MG/1
TABLET ORAL
Qty: 90 TABLET | Refills: 1 | Status: SHIPPED | OUTPATIENT
Start: 2019-10-09 | End: 2020-01-13 | Stop reason: SDUPTHER

## 2019-10-18 ENCOUNTER — FLU SHOT (OUTPATIENT)
Dept: FAMILY MEDICINE CLINIC | Facility: CLINIC | Age: 74
End: 2019-10-18

## 2019-10-18 DIAGNOSIS — Z23 NEED FOR PROPHYLACTIC VACCINATION AND INOCULATION AGAINST INFLUENZA: Primary | ICD-10-CM

## 2019-10-18 PROCEDURE — G0008 ADMIN INFLUENZA VIRUS VAC: HCPCS | Performed by: NURSE PRACTITIONER

## 2019-10-18 PROCEDURE — 90653 IIV ADJUVANT VACCINE IM: CPT | Performed by: NURSE PRACTITIONER

## 2019-10-21 RX ORDER — ROSUVASTATIN CALCIUM 40 MG/1
TABLET, COATED ORAL
Qty: 90 TABLET | Refills: 1 | Status: SHIPPED | OUTPATIENT
Start: 2019-10-21 | End: 2020-01-23 | Stop reason: SDUPTHER

## 2019-11-25 ENCOUNTER — OFFICE VISIT (OUTPATIENT)
Dept: FAMILY MEDICINE CLINIC | Facility: CLINIC | Age: 74
End: 2019-11-25

## 2019-11-25 VITALS
WEIGHT: 243 LBS | SYSTOLIC BLOOD PRESSURE: 131 MMHG | BODY MASS INDEX: 32.2 KG/M2 | HEIGHT: 73 IN | TEMPERATURE: 97.7 F | DIASTOLIC BLOOD PRESSURE: 81 MMHG | HEART RATE: 62 BPM | OXYGEN SATURATION: 95 %

## 2019-11-25 DIAGNOSIS — I25.10 CORONARY ARTERY DISEASE INVOLVING NATIVE HEART WITHOUT ANGINA PECTORIS, UNSPECIFIED VESSEL OR LESION TYPE: ICD-10-CM

## 2019-11-25 DIAGNOSIS — M48.061 SPINAL STENOSIS OF LUMBAR REGION WITHOUT NEUROGENIC CLAUDICATION: ICD-10-CM

## 2019-11-25 DIAGNOSIS — K21.9 GASTROESOPHAGEAL REFLUX DISEASE WITH HIATAL HERNIA: Primary | ICD-10-CM

## 2019-11-25 DIAGNOSIS — Z23 NEED FOR 23-POLYVALENT PNEUMOCOCCAL POLYSACCHARIDE VACCINE: ICD-10-CM

## 2019-11-25 DIAGNOSIS — M51.37 DEGENERATION OF INTERVERTEBRAL DISC OF LUMBOSACRAL REGION: ICD-10-CM

## 2019-11-25 DIAGNOSIS — K44.9 GASTROESOPHAGEAL REFLUX DISEASE WITH HIATAL HERNIA: Primary | ICD-10-CM

## 2019-11-25 PROBLEM — E78.5 HYPERLIPIDEMIA: Status: ACTIVE | Noted: 2019-11-25

## 2019-11-25 PROBLEM — I10 HYPERTENSION: Status: ACTIVE | Noted: 2019-11-25

## 2019-11-25 PROCEDURE — 99214 OFFICE O/P EST MOD 30 MIN: CPT | Performed by: NURSE PRACTITIONER

## 2019-11-25 PROCEDURE — 90732 PPSV23 VACC 2 YRS+ SUBQ/IM: CPT | Performed by: NURSE PRACTITIONER

## 2019-11-25 PROCEDURE — G0009 ADMIN PNEUMOCOCCAL VACCINE: HCPCS | Performed by: NURSE PRACTITIONER

## 2019-11-25 NOTE — PROGRESS NOTES
Subjective   Donald Navarro is a 74 y.o. male.      72-year-old obese white male with history of CAD/MI/stents, hypertension, hyperlipidemia, arthritis, spinal stenosis lumbar, DDD and BPH who comes in today for follow-up visit.  Patient has not been here since January 2018 but he has been following Cardiology every 6 months and having blood work and also sees Urology for his PSAs.  He is also going to Gastroenterology for hiatal hernia and has been placed on Protonix and has a follow-up visit coming up next month.  He had a negative stress test in January and he is intermittent chest pain is apparently coming from his hernia  Patient does have a cyst on his right kidney that has been stable for quite a while and his PSAs have been within normal limits  He is not going to pain management for his lumbar and has seen Dr. Eubanks.  They are currently trying steroid injections which she states have helped   blood pressure 130/80 heart rate 62 with very mild murmur he denies any chest pain, dyspnea, tachycardia, dizziness or edema   he is up-to-date on preventative with exception of an eye exam and he is getting a Pneumovax 23 today   weight is stable at 243     make eye exam   Pneumovax 23 today   Keep all appointments with Cardiology pain management and GI           The following portions of the patient's history were reviewed and updated as appropriate: allergies, current medications, past family history, past medical history, past social history, past surgical history and problem list.    Review of Systems   Constitutional: Negative.    Respiratory: Negative.    Cardiovascular: Negative.    Gastrointestinal: Positive for GERD and indigestion.   Genitourinary: Negative.    Musculoskeletal: Positive for back pain.   Skin: Negative.    Neurological: Negative.    Psychiatric/Behavioral: Negative.        Objective   Physical Exam   Constitutional: He is oriented to person, place, and time. He appears well-developed and  well-nourished.   Cardiovascular: Normal rate and regular rhythm.   Murmur heard.  Pulmonary/Chest: Effort normal and breath sounds normal.   Abdominal:   Upper epigastric chest pain secondary to hernia   Musculoskeletal:   Chronic back pain is in pain management and has seen Ortho   Neurological: He is alert and oriented to person, place, and time.   Skin: Skin is warm and dry.   Psychiatric: He has a normal mood and affect.         Assessment/Plan   Donald was seen today for hypertension and hyperlipidemia.    Diagnoses and all orders for this visit:    Gastroesophageal reflux disease with hiatal hernia    Degeneration of intervertebral disc of lumbosacral region    Spinal stenosis of lumbar region without neurogenic claudication    Coronary artery disease involving native heart without angina pectoris, unspecified vessel or lesion type

## 2019-11-25 NOTE — PATIENT INSTRUCTIONS
Keep all appointments with pain management Cardiology and urology   schedule exam   Pneumovax 23 today

## 2019-12-10 ENCOUNTER — OFFICE (OUTPATIENT)
Dept: URBAN - METROPOLITAN AREA CLINIC 64 | Facility: CLINIC | Age: 74
End: 2019-12-10

## 2019-12-10 VITALS
WEIGHT: 241 LBS | HEART RATE: 65 BPM | HEIGHT: 72 IN | SYSTOLIC BLOOD PRESSURE: 122 MMHG | DIASTOLIC BLOOD PRESSURE: 76 MMHG

## 2019-12-10 DIAGNOSIS — K44.9 DIAPHRAGMATIC HERNIA WITHOUT OBSTRUCTION OR GANGRENE: ICD-10-CM

## 2019-12-10 DIAGNOSIS — K21.9 GASTRO-ESOPHAGEAL REFLUX DISEASE WITHOUT ESOPHAGITIS: ICD-10-CM

## 2019-12-10 PROCEDURE — 99213 OFFICE O/P EST LOW 20 MIN: CPT | Performed by: NURSE PRACTITIONER

## 2019-12-10 RX ORDER — PANTOPRAZOLE SODIUM 20 MG/1
20 TABLET, DELAYED RELEASE ORAL
Qty: 90 | Refills: 3 | Status: COMPLETED
Start: 2019-01-24 | End: 2021-03-08

## 2019-12-27 ENCOUNTER — TRANSCRIBE ORDERS (OUTPATIENT)
Dept: ADMINISTRATIVE | Facility: HOSPITAL | Age: 74
End: 2019-12-27

## 2019-12-27 DIAGNOSIS — M47.816 LUMBAR SPONDYLOSIS: ICD-10-CM

## 2019-12-27 DIAGNOSIS — M54.16 LUMBAR RADICULOPATHY: Primary | ICD-10-CM

## 2019-12-30 ENCOUNTER — HOSPITAL ENCOUNTER (OUTPATIENT)
Dept: MRI IMAGING | Facility: HOSPITAL | Age: 74
Discharge: HOME | End: 2019-12-30
Admitting: PAIN MEDICINE

## 2019-12-30 DIAGNOSIS — M54.16 LUMBAR RADICULOPATHY: ICD-10-CM

## 2019-12-30 DIAGNOSIS — M47.816 LUMBAR SPONDYLOSIS: ICD-10-CM

## 2019-12-30 PROCEDURE — 72148 MRI LUMBAR SPINE W/O DYE: CPT

## 2020-01-13 ENCOUNTER — TELEPHONE (OUTPATIENT)
Dept: CARDIOLOGY | Facility: CLINIC | Age: 75
End: 2020-01-13

## 2020-01-13 RX ORDER — LISINOPRIL 5 MG/1
5 TABLET ORAL DAILY
Qty: 90 TABLET | Refills: 1 | Status: SHIPPED | OUTPATIENT
Start: 2020-01-13 | End: 2020-07-10

## 2020-01-13 NOTE — TELEPHONE ENCOUNTER
Alaina Acosta  Lisinopril 5 mg, 1 daily, 90 day supply with refills  Changed ins, so needs Rx at new pharmacy.  Call [phone] if questions

## 2020-01-21 LAB
ALBUMIN SERPL-MCNC: 4 G/DL (ref 3.7–4.7)
ALBUMIN/GLOB SERPL: 1.7 {RATIO} (ref 1.2–2.2)
ALP SERPL-CCNC: 82 IU/L (ref 39–117)
ALT SERPL-CCNC: 21 IU/L (ref 0–44)
AST SERPL-CCNC: 26 IU/L (ref 0–40)
BILIRUB SERPL-MCNC: 0.4 MG/DL (ref 0–1.2)
BUN SERPL-MCNC: 12 MG/DL (ref 8–27)
BUN/CREAT SERPL: 9 (ref 10–24)
CALCIUM SERPL-MCNC: 8.8 MG/DL (ref 8.6–10.2)
CHLORIDE SERPL-SCNC: 109 MMOL/L (ref 96–106)
CHOLEST SERPL-MCNC: 130 MG/DL (ref 100–199)
CK BB CFR SERPL ELPH: 1 %
CK MACRO1 CFR SERPL: 2 %
CK MACRO2 CFR SERPL: 0 %
CK MB CFR SERPL ELPH: 0 % (ref 0–3)
CK MM CFR SERPL ELPH: 98 % (ref 97–100)
CK SERPL-CCNC: 138 U/L (ref 24–204)
CO2 SERPL-SCNC: 19 MMOL/L (ref 20–29)
CREAT SERPL-MCNC: 1.27 MG/DL (ref 0.76–1.27)
GLOBULIN SER CALC-MCNC: 2.3 G/DL (ref 1.5–4.5)
GLUCOSE SERPL-MCNC: 100 MG/DL (ref 65–99)
HDLC SERPL-MCNC: 29 MG/DL
LDLC SERPL CALC-MCNC: 64 MG/DL (ref 0–99)
POTASSIUM SERPL-SCNC: 4.3 MMOL/L (ref 3.5–5.2)
PROT SERPL-MCNC: 6.3 G/DL (ref 6–8.5)
SODIUM SERPL-SCNC: 142 MMOL/L (ref 134–144)
TRIGL SERPL-MCNC: 183 MG/DL (ref 0–149)
VLDLC SERPL CALC-MCNC: 37 MG/DL (ref 5–40)

## 2020-01-22 NOTE — PROGRESS NOTES
Subjective:     Encounter Date:01/23/2020      Patient ID: Donald Navarro is a 74 y.o. male.    Chief Complaint: Follow-up for CAD, HTN, & HLD  History of Present Illness     74 -year-old white male patient with known history of CAD status post PCI comes back for follow up.      patient underwent PCI to LAD Previously.      recent labs showed a lipids well controlled except Elevated triglycerides,  liver enzymes within normal limit, Decreased HDL stable   patient was advised to take fish oil and aggressive diet control      patient was admit to the hospital in January 2018 a stress test showed inferolateral ischemia      January 2018 cardiac catheterization showed left main 0% stenosis proximal LAD up to 40% InStent restenoses mid to distal less than 30% disease circumflex artery less than 30% disease RCA diffuse luminal irregularities less than 30-40% disease is a   dominant artery      stress Myoview June 2019 which showed mostly fixed distal inferolateral defect  LV function normal  Patient was diagnosed with possible hiatal hernia  Advised him to continue to take current medications modify cardiac risk factors aggressively   may need repeat cardiac Catheterization if recurrent symptoms  EKG normal sinus rhythm no changes compared to the last EKG  The following portions of the patient's history were reviewed and updated as appropriate: Allergies current medications past family history past medical history past social history past surgical history problem list and review of systems  Past Medical History:   Diagnosis Date   • Coronary artery disease    • GERD (gastroesophageal reflux disease)    • Hyperlipidemia    • Hypertension    • Myocardial infarction (CMS/HCC)      Past Surgical History:   Procedure Laterality Date   • CARDIAC CATHETERIZATION  01/2108    TIA: proximal LAD   • COLONOSCOPY     • TOTAL HIP ARTHROPLASTY Left      /95 (BP Location: Left arm, Patient Position: Sitting, Cuff Size: Adult)   " Pulse 67   Ht 185.4 cm (73\")   Wt 109 kg (241 lb)   SpO2 96%   BMI 31.80 kg/m²   Family History   Problem Relation Age of Onset   • Heart disease Father        Current Outpatient Medications:   •  aspirin 81 MG tablet, 2 daily, Disp: , Rfl:   •  clopidogrel (PLAVIX) 75 MG tablet, Take 1 tablet by mouth Daily., Disp: 90 tablet, Rfl: 1  •  diphenhydrAMINE-acetaminophen (TYLENOL PM EXTRA STRENGTH)  MG tablet per tablet, TYLENOL PM EXTRA STRENGTH 500-25 MG TABS, Disp: , Rfl:   •  fluticasone (FLONASE) 50 MCG/ACT nasal spray, 2 sprays into the nostril(s) as directed by provider Daily As Needed for Rhinitis., Disp: , Rfl:   •  isosorbide mononitrate (IMDUR) 30 MG 24 hr tablet, TAKE 1 TABLET EVERY DAY, Disp: 90 tablet, Rfl: 3  •  lisinopril (PRINIVIL,ZESTRIL) 5 MG tablet, Take 1 tablet by mouth Daily., Disp: 90 tablet, Rfl: 1  •  metoprolol tartrate (LOPRESSOR) 25 MG tablet, TAKE 1/2 TABLET EVERY DAY, Disp: 45 tablet, Rfl: 0  •  pantoprazole (PROTONIX) 40 MG EC tablet, , Disp: , Rfl: 11  •  rosuvastatin (CRESTOR) 40 MG tablet, Take 1 tablet by mouth Daily., Disp: 90 tablet, Rfl: 1  Social History     Socioeconomic History   • Marital status:      Spouse name: Not on file   • Number of children: Not on file   • Years of education: Not on file   • Highest education level: Not on file   Tobacco Use   • Smoking status: Never Smoker   • Smokeless tobacco: Never Used   Substance and Sexual Activity   • Alcohol use: No     Frequency: Never   • Drug use: No   • Sexual activity: Defer     No Known Allergies  Review of Systems   Constitution: Negative for fever and malaise/fatigue.   HENT: Negative for congestion and hearing loss.    Eyes: Negative for double vision and visual disturbance.   Cardiovascular: Negative for chest pain, claudication, dyspnea on exertion, leg swelling and syncope.   Respiratory: Negative for cough and shortness of breath.    Endocrine: Negative for cold intolerance.   Skin: Negative for " color change and rash.   Musculoskeletal: Negative for arthritis and joint pain.   Gastrointestinal: Negative for abdominal pain and heartburn.   Genitourinary: Negative for hematuria.   Neurological: Negative for excessive daytime sleepiness and dizziness.   Psychiatric/Behavioral: Negative for depression. The patient is not nervous/anxious.    All other systems reviewed and are negative.             Objective:     Physical Exam   Constitutional: He is oriented to person, place, and time. He appears well-developed and well-nourished. He is cooperative.   HENT:   Head: Normocephalic and atraumatic.   Mouth/Throat: Uvula is midline and oropharynx is clear and moist. No oral lesions.   Eyes: Conjunctivae are normal. No scleral icterus.   Neck: Trachea normal. Neck supple. No JVD present. Carotid bruit is not present. No thyromegaly present.   Cardiovascular: Normal rate, regular rhythm, S1 normal, S2 normal, normal heart sounds, intact distal pulses and normal pulses. PMI is not displaced. Exam reveals no gallop and no friction rub.   No murmur heard.  Pulmonary/Chest: Effort normal and breath sounds normal.   Abdominal: Soft. Bowel sounds are normal.   Musculoskeletal: Normal range of motion.   Neurological: He is alert and oriented to person, place, and time. He has normal strength.   No focal deficits   Skin: Skin is warm. No cyanosis.   Psychiatric: He has a normal mood and affect.       Procedures    Lab Review:       Assessment:          Diagnosis Plan   1. Coronary artery disease involving native heart without angina pectoris, unspecified vessel or lesion type     2. Mixed hyperlipidemia     3. Essential hypertension            Plan:         History of CAD status post PCI stable  Hypertension dyslipidemia continue aggressive medical treatment cardiac risk factor modification

## 2020-01-23 ENCOUNTER — OFFICE VISIT (OUTPATIENT)
Dept: CARDIOLOGY | Facility: CLINIC | Age: 75
End: 2020-01-23

## 2020-01-23 VITALS
DIASTOLIC BLOOD PRESSURE: 95 MMHG | BODY MASS INDEX: 31.94 KG/M2 | HEIGHT: 73 IN | WEIGHT: 241 LBS | HEART RATE: 67 BPM | SYSTOLIC BLOOD PRESSURE: 139 MMHG | OXYGEN SATURATION: 96 %

## 2020-01-23 DIAGNOSIS — E78.2 MIXED HYPERLIPIDEMIA: ICD-10-CM

## 2020-01-23 DIAGNOSIS — I10 ESSENTIAL HYPERTENSION: ICD-10-CM

## 2020-01-23 DIAGNOSIS — I25.10 CORONARY ARTERY DISEASE INVOLVING NATIVE HEART WITHOUT ANGINA PECTORIS, UNSPECIFIED VESSEL OR LESION TYPE: Primary | ICD-10-CM

## 2020-01-23 PROCEDURE — 93010 ELECTROCARDIOGRAM REPORT: CPT | Performed by: INTERNAL MEDICINE

## 2020-01-23 PROCEDURE — 99213 OFFICE O/P EST LOW 20 MIN: CPT | Performed by: INTERNAL MEDICINE

## 2020-01-23 RX ORDER — CLOPIDOGREL BISULFATE 75 MG/1
75 TABLET ORAL DAILY
Qty: 90 TABLET | Refills: 1 | Status: SHIPPED | OUTPATIENT
Start: 2020-01-23 | End: 2020-08-11

## 2020-01-23 RX ORDER — ROSUVASTATIN CALCIUM 40 MG/1
40 TABLET, COATED ORAL DAILY
Qty: 90 TABLET | Refills: 1 | Status: SHIPPED | OUTPATIENT
Start: 2020-01-23 | End: 2020-03-04

## 2020-03-03 ENCOUNTER — OFFICE (OUTPATIENT)
Dept: URBAN - METROPOLITAN AREA CLINIC 64 | Facility: CLINIC | Age: 75
End: 2020-03-03

## 2020-03-03 VITALS
WEIGHT: 241 LBS | HEIGHT: 72 IN | DIASTOLIC BLOOD PRESSURE: 77 MMHG | SYSTOLIC BLOOD PRESSURE: 123 MMHG | HEART RATE: 61 BPM

## 2020-03-03 DIAGNOSIS — K44.9 DIAPHRAGMATIC HERNIA WITHOUT OBSTRUCTION OR GANGRENE: ICD-10-CM

## 2020-03-03 DIAGNOSIS — K21.9 GASTRO-ESOPHAGEAL REFLUX DISEASE WITHOUT ESOPHAGITIS: ICD-10-CM

## 2020-03-03 PROCEDURE — 99213 OFFICE O/P EST LOW 20 MIN: CPT | Performed by: NURSE PRACTITIONER

## 2020-03-03 RX ORDER — FAMOTIDINE 40 MG/1
40 TABLET, FILM COATED ORAL
Qty: 30 | Refills: 10 | Status: ACTIVE
Start: 2020-03-03

## 2020-03-03 RX ORDER — PANTOPRAZOLE SODIUM 20 MG/1
20 TABLET, DELAYED RELEASE ORAL
Qty: 90 | Refills: 3 | Status: COMPLETED
Start: 2019-01-24 | End: 2021-03-08

## 2020-03-04 ENCOUNTER — APPOINTMENT (OUTPATIENT)
Dept: GENERAL RADIOLOGY | Facility: HOSPITAL | Age: 75
End: 2020-03-04

## 2020-03-04 ENCOUNTER — HOSPITAL ENCOUNTER (OUTPATIENT)
Facility: HOSPITAL | Age: 75
Setting detail: OBSERVATION
Discharge: HOME | End: 2020-03-05
Attending: EMERGENCY MEDICINE | Admitting: INTERNAL MEDICINE

## 2020-03-04 DIAGNOSIS — I20.0 UNSTABLE ANGINA (HCC): Primary | ICD-10-CM

## 2020-03-04 PROBLEM — K21.9 GASTROESOPHAGEAL REFLUX DISEASE WITH HIATAL HERNIA: Chronic | Status: ACTIVE | Noted: 2018-01-24

## 2020-03-04 PROBLEM — I10 HYPERTENSION: Chronic | Status: ACTIVE | Noted: 2019-11-25

## 2020-03-04 PROBLEM — K44.9 GASTROESOPHAGEAL REFLUX DISEASE WITH HIATAL HERNIA: Chronic | Status: ACTIVE | Noted: 2018-01-24

## 2020-03-04 PROBLEM — G47.00 INSOMNIA: Chronic | Status: ACTIVE | Noted: 2020-03-04

## 2020-03-04 PROBLEM — E78.5 HYPERLIPIDEMIA: Chronic | Status: ACTIVE | Noted: 2019-11-25

## 2020-03-04 PROBLEM — I25.10 CORONARY ARTERY DISEASE: Chronic | Status: ACTIVE | Noted: 2019-11-25

## 2020-03-04 PROBLEM — J30.2 SEASONAL ALLERGIES: Chronic | Status: ACTIVE | Noted: 2020-03-04

## 2020-03-04 LAB
ANION GAP SERPL CALCULATED.3IONS-SCNC: 10 MMOL/L (ref 5–15)
BASOPHILS # BLD AUTO: 0.1 10*3/MM3 (ref 0–0.2)
BASOPHILS NFR BLD AUTO: 1.4 % (ref 0–1.5)
BUN BLD-MCNC: 13 MG/DL (ref 8–23)
BUN/CREAT SERPL: 12.9 (ref 7–25)
CALCIUM SPEC-SCNC: 9 MG/DL (ref 8.6–10.5)
CHLORIDE SERPL-SCNC: 105 MMOL/L (ref 98–107)
CO2 SERPL-SCNC: 24 MMOL/L (ref 22–29)
CREAT BLD-MCNC: 1.01 MG/DL (ref 0.76–1.27)
DEPRECATED RDW RBC AUTO: 43.8 FL (ref 37–54)
EOSINOPHIL # BLD AUTO: 0.4 10*3/MM3 (ref 0–0.4)
EOSINOPHIL NFR BLD AUTO: 7.4 % (ref 0.3–6.2)
ERYTHROCYTE [DISTWIDTH] IN BLOOD BY AUTOMATED COUNT: 14.3 % (ref 12.3–15.4)
GFR SERPL CREATININE-BSD FRML MDRD: 73 ML/MIN/1.73
GLUCOSE BLD-MCNC: 108 MG/DL (ref 65–99)
HCT VFR BLD AUTO: 44.8 % (ref 37.5–51)
HGB BLD-MCNC: 15.5 G/DL (ref 13–17.7)
LYMPHOCYTES # BLD AUTO: 1 10*3/MM3 (ref 0.7–3.1)
LYMPHOCYTES NFR BLD AUTO: 21.4 % (ref 19.6–45.3)
MCH RBC QN AUTO: 30.7 PG (ref 26.6–33)
MCHC RBC AUTO-ENTMCNC: 34.6 G/DL (ref 31.5–35.7)
MCV RBC AUTO: 88.9 FL (ref 79–97)
MONOCYTES # BLD AUTO: 0.7 10*3/MM3 (ref 0.1–0.9)
MONOCYTES NFR BLD AUTO: 14.4 % (ref 5–12)
NEUTROPHILS # BLD AUTO: 2.7 10*3/MM3 (ref 1.7–7)
NEUTROPHILS NFR BLD AUTO: 55.4 % (ref 42.7–76)
NRBC BLD AUTO-RTO: 0.1 /100 WBC (ref 0–0.2)
PLATELET # BLD AUTO: 183 10*3/MM3 (ref 140–450)
PMV BLD AUTO: 8.8 FL (ref 6–12)
POTASSIUM BLD-SCNC: 4 MMOL/L (ref 3.5–5.2)
RBC # BLD AUTO: 5.04 10*6/MM3 (ref 4.14–5.8)
SODIUM BLD-SCNC: 139 MMOL/L (ref 136–145)
TROPONIN T SERPL-MCNC: <0.01 NG/ML (ref 0–0.03)
WBC NRBC COR # BLD: 4.8 10*3/MM3 (ref 3.4–10.8)

## 2020-03-04 PROCEDURE — 80048 BASIC METABOLIC PNL TOTAL CA: CPT | Performed by: EMERGENCY MEDICINE

## 2020-03-04 PROCEDURE — 85025 COMPLETE CBC W/AUTO DIFF WBC: CPT | Performed by: EMERGENCY MEDICINE

## 2020-03-04 PROCEDURE — 84484 ASSAY OF TROPONIN QUANT: CPT | Performed by: EMERGENCY MEDICINE

## 2020-03-04 PROCEDURE — 99214 OFFICE O/P EST MOD 30 MIN: CPT | Performed by: INTERNAL MEDICINE

## 2020-03-04 PROCEDURE — G0378 HOSPITAL OBSERVATION PER HR: HCPCS

## 2020-03-04 PROCEDURE — 96365 THER/PROPH/DIAG IV INF INIT: CPT

## 2020-03-04 PROCEDURE — 71045 X-RAY EXAM CHEST 1 VIEW: CPT

## 2020-03-04 PROCEDURE — 84484 ASSAY OF TROPONIN QUANT: CPT | Performed by: NURSE PRACTITIONER

## 2020-03-04 PROCEDURE — 96366 THER/PROPH/DIAG IV INF ADDON: CPT

## 2020-03-04 PROCEDURE — 99284 EMERGENCY DEPT VISIT MOD MDM: CPT

## 2020-03-04 PROCEDURE — 93005 ELECTROCARDIOGRAM TRACING: CPT | Performed by: EMERGENCY MEDICINE

## 2020-03-04 PROCEDURE — 99220 PR INITIAL OBSERVATION CARE/DAY 70 MINUTES: CPT | Performed by: NURSE PRACTITIONER

## 2020-03-04 RX ORDER — ACETAMINOPHEN 160 MG/5ML
650 SOLUTION ORAL EVERY 4 HOURS PRN
Status: DISCONTINUED | OUTPATIENT
Start: 2020-03-04 | End: 2020-03-05 | Stop reason: HOSPADM

## 2020-03-04 RX ORDER — ONDANSETRON 4 MG/1
4 TABLET, FILM COATED ORAL EVERY 6 HOURS PRN
Status: DISCONTINUED | OUTPATIENT
Start: 2020-03-04 | End: 2020-03-05 | Stop reason: HOSPADM

## 2020-03-04 RX ORDER — ASPIRIN 81 MG/1
81 TABLET ORAL DAILY
Status: DISCONTINUED | OUTPATIENT
Start: 2020-03-04 | End: 2020-03-05 | Stop reason: HOSPADM

## 2020-03-04 RX ORDER — MAGNESIUM SULFATE 1 G/100ML
1 INJECTION INTRAVENOUS AS NEEDED
Status: DISCONTINUED | OUTPATIENT
Start: 2020-03-04 | End: 2020-03-05 | Stop reason: HOSPADM

## 2020-03-04 RX ORDER — ACETAMINOPHEN 325 MG/1
650 TABLET ORAL EVERY 4 HOURS PRN
Status: DISCONTINUED | OUTPATIENT
Start: 2020-03-04 | End: 2020-03-05 | Stop reason: HOSPADM

## 2020-03-04 RX ORDER — SODIUM CHLORIDE 0.9 % (FLUSH) 0.9 %
10 SYRINGE (ML) INJECTION AS NEEDED
Status: DISCONTINUED | OUTPATIENT
Start: 2020-03-04 | End: 2020-03-05 | Stop reason: HOSPADM

## 2020-03-04 RX ORDER — POTASSIUM CHLORIDE 20 MEQ/1
40 TABLET, EXTENDED RELEASE ORAL AS NEEDED
Status: DISCONTINUED | OUTPATIENT
Start: 2020-03-04 | End: 2020-03-05 | Stop reason: HOSPADM

## 2020-03-04 RX ORDER — ROSUVASTATIN CALCIUM 10 MG/1
40 TABLET, COATED ORAL NIGHTLY
Status: DISCONTINUED | OUTPATIENT
Start: 2020-03-04 | End: 2020-03-05 | Stop reason: HOSPADM

## 2020-03-04 RX ORDER — LISINOPRIL 5 MG/1
5 TABLET ORAL DAILY
Status: DISCONTINUED | OUTPATIENT
Start: 2020-03-04 | End: 2020-03-05 | Stop reason: HOSPADM

## 2020-03-04 RX ORDER — SODIUM CHLORIDE 0.9 % (FLUSH) 0.9 %
10 SYRINGE (ML) INJECTION EVERY 12 HOURS SCHEDULED
Status: DISCONTINUED | OUTPATIENT
Start: 2020-03-04 | End: 2020-03-05 | Stop reason: HOSPADM

## 2020-03-04 RX ORDER — FLUTICASONE PROPIONATE 50 MCG
2 SPRAY, SUSPENSION (ML) NASAL DAILY PRN
Status: DISCONTINUED | OUTPATIENT
Start: 2020-03-04 | End: 2020-03-05 | Stop reason: HOSPADM

## 2020-03-04 RX ORDER — NITROGLYCERIN 20 MG/100ML
10-50 INJECTION INTRAVENOUS
Status: DISCONTINUED | OUTPATIENT
Start: 2020-03-04 | End: 2020-03-05 | Stop reason: HOSPADM

## 2020-03-04 RX ORDER — ROSUVASTATIN CALCIUM 20 MG/1
40 TABLET, COATED ORAL NIGHTLY
COMMUNITY
End: 2020-07-23 | Stop reason: SDUPTHER

## 2020-03-04 RX ORDER — ISOSORBIDE MONONITRATE 30 MG/1
30 TABLET, EXTENDED RELEASE ORAL DAILY
COMMUNITY
End: 2020-03-10 | Stop reason: SDUPTHER

## 2020-03-04 RX ORDER — CLOPIDOGREL BISULFATE 75 MG/1
75 TABLET ORAL DAILY
Status: DISCONTINUED | OUTPATIENT
Start: 2020-03-04 | End: 2020-03-05 | Stop reason: HOSPADM

## 2020-03-04 RX ORDER — ONDANSETRON 2 MG/ML
4 INJECTION INTRAMUSCULAR; INTRAVENOUS EVERY 6 HOURS PRN
Status: DISCONTINUED | OUTPATIENT
Start: 2020-03-04 | End: 2020-03-05 | Stop reason: HOSPADM

## 2020-03-04 RX ORDER — MAGNESIUM SULFATE HEPTAHYDRATE 40 MG/ML
2 INJECTION, SOLUTION INTRAVENOUS AS NEEDED
Status: DISCONTINUED | OUTPATIENT
Start: 2020-03-04 | End: 2020-03-05 | Stop reason: HOSPADM

## 2020-03-04 RX ORDER — PANTOPRAZOLE SODIUM 20 MG/1
20 TABLET, DELAYED RELEASE ORAL
Status: DISCONTINUED | OUTPATIENT
Start: 2020-03-04 | End: 2020-03-04

## 2020-03-04 RX ORDER — PANTOPRAZOLE SODIUM 40 MG/1
40 TABLET, DELAYED RELEASE ORAL DAILY
Status: DISCONTINUED | OUTPATIENT
Start: 2020-03-05 | End: 2020-03-05 | Stop reason: HOSPADM

## 2020-03-04 RX ORDER — ACETAMINOPHEN 650 MG/1
650 SUPPOSITORY RECTAL EVERY 4 HOURS PRN
Status: DISCONTINUED | OUTPATIENT
Start: 2020-03-04 | End: 2020-03-05 | Stop reason: HOSPADM

## 2020-03-04 RX ADMIN — NITROGLYCERIN 10 MCG/MIN: 20 INJECTION INTRAVENOUS at 10:37

## 2020-03-04 RX ADMIN — Medication 10 ML: at 14:42

## 2020-03-04 RX ADMIN — Medication 10 ML: at 20:25

## 2020-03-04 RX ADMIN — ROSUVASTATIN CALCIUM 40 MG: 10 TABLET, FILM COATED ORAL at 20:23

## 2020-03-04 NOTE — ED NOTES
Pt presents with 1 day hx of L sided cp radiating into L arm. Pt reports woke up at 0730 and pain was worse. Reports having SOA this AM as well but has since resolved. Cardiac hx noted. 2 stents placed 2  Years ago. Cardio: Cliff Durand, RN  03/04/20 9778

## 2020-03-04 NOTE — ED PROVIDER NOTES
Subjective   Patient is a 74-year-old male complaining of intermittent chest pain for the past 12 hours.  States the pain is mild and lasts minutes to hours at a time.  There is radiation of pain to his left arm and shoulder.  He states this is similar to pain he has had in the past associated with MIs.  He denies cough fever shortness of breath or other associated complaints.          Review of Systems  Negative for headache earache sore throat cough fever previous chest pain shortness of breath abdominal pain vomiting diarrhea dysuria achiness weight loss or other associated complaints.  A complete review of systems was obtained and is otherwise negative.  Past Medical History:   Diagnosis Date   • Coronary artery disease    • GERD (gastroesophageal reflux disease)    • Hyperlipidemia    • Hypertension    • Myocardial infarction (CMS/HCC)        Allergies   Allergen Reactions   • Adhesive Tape Itching       Past Surgical History:   Procedure Laterality Date   • CARDIAC CATHETERIZATION  01/2108    TIA: proximal LAD   • COLONOSCOPY     • HERNIA REPAIR     • TOTAL HIP ARTHROPLASTY Left        Family History   Problem Relation Age of Onset   • Heart disease Father        Social History     Socioeconomic History   • Marital status:      Spouse name: Not on file   • Number of children: Not on file   • Years of education: Not on file   • Highest education level: Not on file   Tobacco Use   • Smoking status: Never Smoker   • Smokeless tobacco: Never Used   Substance and Sexual Activity   • Alcohol use: No     Frequency: Never   • Drug use: No   • Sexual activity: Defer           Objective   Physical Exam  HEENT exam shows TMs to be clear.  Oropharynx comers but sclerae nonicteric.  Neck has no adenopathy JVD or bruits.  Lungs are clear.  Heart has a regular rate rhythm without murmur rub or gallop.  Chest is nontender.  Abdomen is soft nontender.  Extremity exam is no cyanosis or edema.  Procedures     My EKG  interpretation shows normal sinus rhythm with no acute ST change      ED Course      Results for orders placed or performed during the hospital encounter of 03/04/20   Basic Metabolic Panel   Result Value Ref Range    Glucose 108 (H) 65 - 99 mg/dL    BUN 13 8 - 23 mg/dL    Creatinine 1.01 0.76 - 1.27 mg/dL    Sodium 139 136 - 145 mmol/L    Potassium 4.0 3.5 - 5.2 mmol/L    Chloride 105 98 - 107 mmol/L    CO2 24.0 22.0 - 29.0 mmol/L    Calcium 9.0 8.6 - 10.5 mg/dL    eGFR Non African Amer 73 >60 mL/min/1.73    BUN/Creatinine Ratio 12.9 7.0 - 25.0    Anion Gap 10.0 5.0 - 15.0 mmol/L   Troponin   Result Value Ref Range    Troponin T <0.010 0.000 - 0.030 ng/mL   CBC Auto Differential   Result Value Ref Range    WBC 4.80 3.40 - 10.80 10*3/mm3    RBC 5.04 4.14 - 5.80 10*6/mm3    Hemoglobin 15.5 13.0 - 17.7 g/dL    Hematocrit 44.8 37.5 - 51.0 %    MCV 88.9 79.0 - 97.0 fL    MCH 30.7 26.6 - 33.0 pg    MCHC 34.6 31.5 - 35.7 g/dL    RDW 14.3 12.3 - 15.4 %    RDW-SD 43.8 37.0 - 54.0 fl    MPV 8.8 6.0 - 12.0 fL    Platelets 183 140 - 450 10*3/mm3    Neutrophil % 55.4 42.7 - 76.0 %    Lymphocyte % 21.4 19.6 - 45.3 %    Monocyte % 14.4 (H) 5.0 - 12.0 %    Eosinophil % 7.4 (H) 0.3 - 6.2 %    Basophil % 1.4 0.0 - 1.5 %    Neutrophils, Absolute 2.70 1.70 - 7.00 10*3/mm3    Lymphocytes, Absolute 1.00 0.70 - 3.10 10*3/mm3    Monocytes, Absolute 0.70 0.10 - 0.90 10*3/mm3    Eosinophils, Absolute 0.40 0.00 - 0.40 10*3/mm3    Basophils, Absolute 0.10 0.00 - 0.20 10*3/mm3    nRBC 0.1 0.0 - 0.2 /100 WBC     Xr Chest 1 View    Result Date: 3/4/2020  Stable chest, no active disease  Electronically Signed By-Donal Juárez On:3/4/2020 10:49 AM This report was finalized on [id number] by  Donal Juárez, .                                           MDM  Number of Diagnoses or Management Options  Diagnosis management comments: Patient has no evidence of acute coronary syndrome based on EKG).  Metabolic panel is normal.  There is no evidence of  infectious process including pneumonia.  Had continued pain in the ED.  He was given morphine and started on Tridil.  He has improvement of his pain.  Will be admitted for unstable angina.  Plan to treat will be cardiac monitoring and serial enzymes.  I did speak to the on-call hospitalist.    Risk of Complications, Morbidity, and/or Mortality  Presenting problems: high  Diagnostic procedures: high  Management options: high    Patient Progress  Patient progress: stable      Final diagnoses:   Unstable angina (CMS/[facility])            Yoseph Woods MD  03/04/20 1145

## 2020-03-04 NOTE — PLAN OF CARE
Problem: Patient Care Overview  Goal: Plan of Care Review  Outcome: Ongoing (interventions implemented as appropriate)  Flowsheets (Taken 3/4/2020 5557)  Progress: improving  Plan of Care Reviewed With: patient; significant other  Outcome Summary: pt on cardiac telemetry monitoring, tridil drip at 10mcg/min, bedside monitoring, cardiology consult. await DrAranza Dev to see patient for further testing/orders. continue to monitor

## 2020-03-04 NOTE — H&P
[facility] Medicine Services      Patient Name: Donald Navarro  : 1945  MRN: [id number]  Primary Care Physician: Rama Quintanilla APRN  Date of admission: 3/4/2020    Patient Care Team:  Rama Quintanilla APRN as PCP - General  Rama Quintanilla APRN as PCP - Family Medicine  Marcia, Trey Bush MD as PCP - Claims Attributed          Subjective   History Present Illness     Chief Complaint:   Chief Complaint   Patient presents with   • Chest Pain       Mr. Navarro is a 74 y.o.  male with a past medical history of CAD status post PCI, GERD, hyperlipidemia, hypertension, and pulmonary sarcoidosis who presented [facility] on 3/4/2020 with chest pain.  Patient states a couple times the night he had chest pain, however the strongest chest pain he had was at 7:30 this morning.  He described the chest pain is very sharp and radiating down his left arm.  He reported his chest pain was accompanied by slight shortness of breath.  His chest pain was a 4 out of 10 when he presented to [facility] and he states his chest pain is now relieved with the nitro drip.  He denies any aggravating factors.  He is seen by Dr. Reese seay.  He stated he had a heart catheterization 2 years ago and a stress test in 2019.  He denied any recent dizziness, syncope, nausea, vomiting, diarrhea, fever, or chills.    In the ED, chest x-ray unremarkable.  EKG showed sinus bradycardia with rate in the 50.  All labs unremarkable upon admission.  All vital signs stable upon admission except heart rate 49-55 and blood pressure 162/95 which has improved.  Patient was started on nitro drip in the ED.    Review of Systems   Constitution: Negative.   HENT: Negative.    Cardiovascular: Positive for chest pain.   Respiratory: Positive for shortness of breath.    Skin: Negative.    Musculoskeletal: Negative.         Left arm pain    Gastrointestinal: Negative.    Genitourinary: Negative.     Neurological: Negative.    Psychiatric/Behavioral: Negative.            Personal History     Past Medical History:   Past Medical History:   Diagnosis Date   • Coronary artery disease    • GERD (gastroesophageal reflux disease)    • Hyperlipidemia    • Hypertension    • Myocardial infarction (CMS/HCC)        Surgical History:      Past Surgical History:   Procedure Laterality Date   • CARDIAC CATHETERIZATION  01/2108    TIA: proximal LAD   • COLONOSCOPY     • HERNIA REPAIR     • TOTAL HIP ARTHROPLASTY Left            Family History: family history includes Heart disease in his father. Otherwise pertinent FHx was reviewed and unremarkable.     Social History:  reports that he has never smoked. He has never used smokeless tobacco. He reports that he does not drink alcohol or use drugs.      Medications:  Prior to Admission medications    Medication Sig Start Date End Date Taking? Authorizing Provider   aspirin 81 MG tablet 2 daily 1/10/14  Yes ProviderBlayne MD   clopidogrel (PLAVIX) 75 MG tablet Take 1 tablet by mouth Daily. 1/23/20  Yes Trey Kennedy MD   diphenhydrAMINE-acetaminophen (TYLENOL PM EXTRA STRENGTH)  MG tablet per tablet TYLENOL PM EXTRA STRENGTH 500-25 MG TABS 1/10/14  Yes ProviderBlayne MD   fluticasone (FLONASE) 50 MCG/ACT nasal spray 2 sprays into the nostril(s) as directed by provider Daily As Needed for Rhinitis.   Yes ProviderBlayne MD   isosorbide mononitrate (IMDUR) 30 MG 24 hr tablet TAKE 1 TABLET EVERY DAY 9/3/19  Yes Trey Kennedy MD   lisinopril (PRINIVIL,ZESTRIL) 5 MG tablet Take 1 tablet by mouth Daily. 1/13/20  Yes Trey Kennedy MD   metoprolol tartrate (LOPRESSOR) 25 MG tablet TAKE 1/2 TABLET EVERY DAY 12/2/19  Yes Trey Kennedy MD   pantoprazole (PROTONIX) 40 MG EC tablet  4/28/19  Yes ProviderBlayne MD   rosuvastatin (CRESTOR) 40 MG tablet Take 1 tablet by mouth Daily. 1/23/20  Yes  Trey Kennedy MD       Allergies:    Allergies   Allergen Reactions   • Adhesive Tape Itching       Objective   Objective     Vital Signs  Temp:  [97.5 °F (36.4 °C)] 97.5 °F (36.4 °C)  Heart Rate:  [49-61] 61  Resp:  [18] 18  BP: (114-162)/(73-95) 124/86  SpO2:  [96 %-99 %] 98 %  on   ;      Body mass index is 32.62 kg/m².    Physical Exam   Constitutional: He is oriented to person, place, and time. He appears well-developed and well-nourished.   HENT:   Head: Normocephalic and atraumatic.   Right Ear: External ear normal.   Left Ear: External ear normal.   Nose: Nose normal.   Mouth/Throat: Oropharynx is clear and moist.   Eyes: Pupils are equal, round, and reactive to light. Conjunctivae and EOM are normal.   Neck: Normal range of motion.   Cardiovascular: Normal rate, regular rhythm, normal heart sounds and intact distal pulses.   S1,S2 audible   Pulmonary/Chest: Effort normal and breath sounds normal.   On room air    Abdominal: Soft. Bowel sounds are normal.   Musculoskeletal: Normal range of motion.   Neurological: He is alert and oriented to person, place, and time.   Skin: Skin is warm and dry.   Psychiatric: He has a normal mood and affect. His behavior is normal. Judgment and thought content normal.   Vitals reviewed.      Results Review:  I have personally reviewed most recent cardiac tracings, lab results and radiology images and interpretations and agree with findings.    Results from last 7 days   Lab Units 03/04/20  1037   WBC 10*3/mm3 4.80   HEMOGLOBIN g/dL 15.5   HEMATOCRIT % 44.8   PLATELETS 10*3/mm3 183     Results from last 7 days   Lab Units 03/04/20  1037   SODIUM mmol/L 139   POTASSIUM mmol/L 4.0   CHLORIDE mmol/L 105   CO2 mmol/L 24.0   BUN mg/dL 13   CREATININE mg/dL 1.01   GLUCOSE mg/dL 108*   CALCIUM mg/dL 9.0   TROPONIN T ng/mL <0.010     Estimated Creatinine Clearance: 84.3 mL/min (by C-G formula based on SCr of 1.01 mg/dL).  Brief Urine Lab Results     None           Microbiology Results (last 10 days)     ** No results found for the last 240 hours. **          ECG/EMG Results (most recent)     Procedure Component Value Units Date/Time    ECG 12 Lead [[id number]] Collected:  03/04/20 1000     Updated:  03/04/20 1001    Narrative:       HEART RATE= 50  bpm  RR Interval= 1200  ms  PA Interval= 149  ms  P Horizontal Axis= 9  deg  P Front Axis= 32  deg  QRSD Interval= 94  ms  QT Interval= 458  ms  QRS Axis= -4  deg  T Wave Axis= 80  deg  - OTHERWISE NORMAL ECG -  Sinus bradycardia  Electronically Signed By:   Date and Time of Study: 2020-03-04 10:00:36                    Xr Chest 1 View    Result Date: 3/4/2020  Stable chest, no active disease  Electronically Signed By-Donal Juárez On:3/4/2020 10:49 AM This report was finalized on [id number] by  Donal Juárez, .        Estimated Creatinine Clearance: 84.3 mL/min (by C-G formula based on SCr of 1.01 mg/dL).    Assessment/Plan   Assessment/Plan       Active Hospital Problems    Diagnosis  POA   • **Unstable angina (CMS/HCC) [I20.0]  Yes     Priority: High   • Coronary artery disease [I25.10]  Yes   • Hyperlipidemia [E78.5]  Yes   • Hypertension [I10]  Yes   • Gastroesophageal reflux disease with hiatal hernia [K21.9, K44.9]  Yes   • Benign prostatic hyperplasia with urinary obstruction [N40.1, N13.8]  Yes   • Pulmonary sarcoidosis (CMS/HCC) [D86.0]  Yes      Resolved Hospital Problems   No resolved problems to display.       Unstable angina  -Patient states his symptoms have subsided  -Previous stress test on 6/18/2019 showed normal stress test/left ventricular ejection fraction is normal (Calculated EF = 65%).   -Chest x-ray unremarkable.   -  EKG showed sinus bradycardia with rate in the 50.    - Troponin X1, normal- trend   - Continuous cardiac monitoring   - Patient started on nitro gtt in the ED   -Continue home aspirin  -Continue nitro drip for now, holding home imdur while patient on nitro gtt   -Cardiology  "consulted    Essential HTN  - moderately controlled  - Monitor blood pressure   -Continue lisinopril and metoprolol    HLD  -Continue atorvastatin    CAD status post PCI x2 (2018)  - Managed by Dr. Leigh outpatient  -Continue aspirin, rosuvastatin, Plavix, and metoprolol  - Holding home indur while patient on nitro gtt    Pulmonary sarcoidosis   -Patient states this is \"in remission \"  -He is not followed outpatient by pulmonologist    GERD  -Continue pantoprazole    Seasonal allergies  -Continue Flonase    Insomnia  -Continue Unisom    Obesity  - BMI 32.6  - encourage lifestyle modifications         VTE Prophylaxis - Lovenox       CODE STATUS:    Code Status and Medical Interventions:   Ordered at: 03/04/20 1255     Level Of Support Discussed With:    Patient     Code Status:    CPR     Medical Interventions (Level of Support Prior to Arrest):    Full       Admission Status:  I believe this patient meets Observation  criteria.      I discussed the patient's findings and my recommendations with patient and family.        Electronically signed by CHEMO Elmore, 03/04/20, 11:56 AM.  [facility] Hospitalist Team        "

## 2020-03-05 ENCOUNTER — APPOINTMENT (OUTPATIENT)
Dept: NUCLEAR MEDICINE | Facility: HOSPITAL | Age: 75
End: 2020-03-05

## 2020-03-05 VITALS
BODY MASS INDEX: 30.16 KG/M2 | DIASTOLIC BLOOD PRESSURE: 84 MMHG | WEIGHT: 235.01 LBS | HEART RATE: 57 BPM | HEIGHT: 74 IN | RESPIRATION RATE: 15 BRPM | TEMPERATURE: 98.1 F | SYSTOLIC BLOOD PRESSURE: 125 MMHG | OXYGEN SATURATION: 96 %

## 2020-03-05 LAB
BH CV NUCLEAR PRIOR STUDY: 3
BH CV STRESS BP STAGE 1: NORMAL
BH CV STRESS BP STAGE 2: NORMAL
BH CV STRESS BP STAGE 3: NORMAL
BH CV STRESS COMMENTS STAGE 1: NORMAL
BH CV STRESS COMMENTS STAGE 2: NORMAL
BH CV STRESS DOSE REGADENOSON STAGE 1: 0.4
BH CV STRESS DURATION MIN STAGE 1: 0
BH CV STRESS DURATION MIN STAGE 2: 4
BH CV STRESS DURATION SEC STAGE 1: 10
BH CV STRESS DURATION SEC STAGE 2: 0
BH CV STRESS HR STAGE 1: 69
BH CV STRESS HR STAGE 2: 82
BH CV STRESS HR STAGE 3: 74
BH CV STRESS PROTOCOL 1: NORMAL
BH CV STRESS RECOVERY BP: NORMAL MMHG
BH CV STRESS RECOVERY HR: 70 BPM
BH CV STRESS STAGE 1: 1
BH CV STRESS STAGE 2: 2
BH CV STRESS STAGE 3: 3
LV EF NUC BP: 59 %
MAGNESIUM SERPL-MCNC: 1.9 MG/DL (ref 1.6–2.4)
MAXIMAL PREDICTED HEART RATE: 148 BPM
PERCENT MAX PREDICTED HR: 55.41 %
POTASSIUM BLD-SCNC: 4.2 MMOL/L (ref 3.5–5.2)
STRESS BASELINE BP: NORMAL MMHG
STRESS BASELINE HR: 63 BPM
STRESS PERCENT HR: 65 %
STRESS POST PEAK BP: NORMAL MMHG
STRESS POST PEAK HR: 82 BPM
STRESS TARGET HR: 126 BPM
TROPONIN T SERPL-MCNC: <0.01 NG/ML (ref 0–0.03)

## 2020-03-05 PROCEDURE — 25010000002 REGADENOSON 0.4 MG/5ML SOLUTION: Performed by: INTERNAL MEDICINE

## 2020-03-05 PROCEDURE — G0378 HOSPITAL OBSERVATION PER HR: HCPCS

## 2020-03-05 PROCEDURE — 78452 HT MUSCLE IMAGE SPECT MULT: CPT

## 2020-03-05 PROCEDURE — 96366 THER/PROPH/DIAG IV INF ADDON: CPT

## 2020-03-05 PROCEDURE — A9500 TC99M SESTAMIBI: HCPCS | Performed by: INTERNAL MEDICINE

## 2020-03-05 PROCEDURE — 99232 SBSQ HOSP IP/OBS MODERATE 35: CPT | Performed by: INTERNAL MEDICINE

## 2020-03-05 PROCEDURE — 99217 PR OBSERVATION CARE DISCHARGE MANAGEMENT: CPT | Performed by: INTERNAL MEDICINE

## 2020-03-05 PROCEDURE — 0 TECHNETIUM SESTAMIBI: Performed by: INTERNAL MEDICINE

## 2020-03-05 PROCEDURE — 93018 CV STRESS TEST I&R ONLY: CPT | Performed by: INTERNAL MEDICINE

## 2020-03-05 PROCEDURE — 84484 ASSAY OF TROPONIN QUANT: CPT | Performed by: NURSE PRACTITIONER

## 2020-03-05 PROCEDURE — 83735 ASSAY OF MAGNESIUM: CPT | Performed by: NURSE PRACTITIONER

## 2020-03-05 PROCEDURE — 93017 CV STRESS TEST TRACING ONLY: CPT

## 2020-03-05 PROCEDURE — 84132 ASSAY OF SERUM POTASSIUM: CPT | Performed by: NURSE PRACTITIONER

## 2020-03-05 PROCEDURE — 93016 CV STRESS TEST SUPVJ ONLY: CPT | Performed by: NURSE PRACTITIONER

## 2020-03-05 PROCEDURE — 78452 HT MUSCLE IMAGE SPECT MULT: CPT | Performed by: INTERNAL MEDICINE

## 2020-03-05 RX ADMIN — TECHNETIUM TC 99M SESTAMIBI 1 DOSE: 1 INJECTION INTRAVENOUS at 06:50

## 2020-03-05 RX ADMIN — TECHNETIUM TC 99M SESTAMIBI 1 DOSE: 1 INJECTION INTRAVENOUS at 09:00

## 2020-03-05 RX ADMIN — ASPIRIN 81 MG: 81 TABLET, COATED ORAL at 10:55

## 2020-03-05 RX ADMIN — CLOPIDOGREL BISULFATE 75 MG: 75 TABLET ORAL at 10:55

## 2020-03-05 RX ADMIN — REGADENOSON 0.4 MG: 0.08 INJECTION, SOLUTION INTRAVENOUS at 09:00

## 2020-03-05 RX ADMIN — PANTOPRAZOLE SODIUM 40 MG: 40 TABLET, DELAYED RELEASE ORAL at 10:56

## 2020-03-05 RX ADMIN — Medication 10 ML: at 10:55

## 2020-03-05 RX ADMIN — METOPROLOL TARTRATE 12.5 MG: 25 TABLET, FILM COATED ORAL at 10:56

## 2020-03-05 RX ADMIN — LISINOPRIL 5 MG: 5 TABLET ORAL at 10:56

## 2020-03-05 NOTE — CONSULTS
Referring Provider:   Reason for Consultation: Unstable angina    Patient Care Team:  Rama Quintanilla APRN as PCP - General  Rama Quintanilla APRN as PCP - Family Medicine  Trey Kennedy MD as PCP - Claims Attributed    Chief complaint chest pain        History of present illness:  Donald Navarro is a 74 y.o. male who presents with chest pain.   74 year-old white male patient with known history of CAD status post LAD PCI now readmitted to the hospital with increasing symptoms of chest pain in the last 48 hours  According to the patient in the last couple of days he is having on and off symptoms of chest pain mostly at rest radiating to the left arm feels like chest tightness without any diaphoresis or syncopal episodes  No exacerbation or relieving factors  No PND orthopnea  EKG unrevealing cardiac enzymes negative  Patient underwent repeat cardiac catheterization 2 years ago which showed nonobstructive disease with mild in-stent restenosis      Review of Systems   Constitution: Positive for malaise/fatigue. Negative for fever.   HENT: Negative for congestion and hearing loss.    Eyes: Negative for double vision and visual disturbance.   Cardiovascular: Positive for chest pain. Negative for claudication, dyspnea on exertion, leg swelling and syncope.   Respiratory: Negative for cough and shortness of breath.    Endocrine: Negative for cold intolerance.   Skin: Negative for color change and rash.   Musculoskeletal: Negative for arthritis and joint pain.   Gastrointestinal: Negative for abdominal pain and heartburn.   Genitourinary: Negative for hematuria.   Neurological: Negative for excessive daytime sleepiness and dizziness.   Psychiatric/Behavioral: Negative for depression. The patient is not nervous/anxious.    All other systems reviewed and are negative.      History  Past Medical History:   Diagnosis Date   • CHF (congestive heart failure) (CMS/HCC)    • Coronary artery disease     • GERD (gastroesophageal reflux disease)    • Hyperlipidemia    • Hypertension    • Myocardial infarction (CMS/HCC)        Past Surgical History:   Procedure Laterality Date   • CARDIAC CATHETERIZATION  01/2007    TIA: proximal LAD   • COLONOSCOPY     • HERNIA REPAIR     • TOTAL HIP ARTHROPLASTY Left        Family History   Problem Relation Age of Onset   • Heart disease Father        Social History     Tobacco Use   • Smoking status: Never Smoker   • Smokeless tobacco: Never Used   Substance Use Topics   • Alcohol use: No     Frequency: Never   • Drug use: No        Medications Prior to Admission   Medication Sig Dispense Refill Last Dose   • aspirin 81 MG tablet Take 162 mg by mouth Daily.   3/4/2020 at Unknown time   • clopidogrel (PLAVIX) 75 MG tablet Take 1 tablet by mouth Daily. 90 tablet 1 3/4/2020 at Unknown time   • diphenhydrAMINE-acetaminophen (TYLENOL PM EXTRA STRENGTH)  MG tablet per tablet Take 1 tablet by mouth Every Night.   3/3/2020 at Unknown time   • fluticasone (FLONASE) 50 MCG/ACT nasal spray 2 sprays into the nostril(s) as directed by provider Daily As Needed for Rhinitis.   3/4/2020 at Unknown time   • isosorbide mononitrate (IMDUR) 30 MG 24 hr tablet Take 30 mg by mouth Daily.   3/4/2020 at Unknown time   • lisinopril (PRINIVIL,ZESTRIL) 5 MG tablet Take 1 tablet by mouth Daily. 90 tablet 1 3/4/2020 at Unknown time   • metoprolol tartrate (LOPRESSOR) 25 MG tablet Take 12.5 mg by mouth Daily.   3/4/2020 at Unknown time   • pantoprazole (PROTONIX) 40 MG EC tablet Take 20 mg by mouth 2 (Two) Times a Day.  11 3/4/2020 at Unknown time   • rosuvastatin (CRESTOR) 20 MG tablet Take 40 mg by mouth Every Night.   3/3/2020 at Unknown time         Adhesive tape    Scheduled Meds:  aspirin 81 mg Oral Daily   clopidogrel 75 mg Oral Daily   enoxaparin 40 mg Subcutaneous Q24H   lisinopril 5 mg Oral Daily   metoprolol tartrate 12.5 mg Oral Daily   [START ON 3/5/2020] pantoprazole 40 mg Oral Daily  "  rosuvastatin 40 mg Oral Nightly   sodium chloride 10 mL Intravenous Q12H     Continuous Infusions:  nitroglycerin 10-50 mcg/min Last Rate: 10 mcg/min (03/04/20 1500)     PRN Meds:.•  acetaminophen **OR** acetaminophen **OR** acetaminophen  •  doxylamine  •  fluticasone  •  magnesium sulfate **OR** magnesium sulfate in D5W 1g/100mL (PREMIX)  •  ondansetron **OR** ondansetron  •  potassium chloride  •  [COMPLETED] Insert peripheral IV **AND** sodium chloride  •  sodium chloride        VITAL SIGNS  Vitals:    03/04/20 1501 03/04/20 1523 03/04/20 1534 03/04/20 1802   BP: 119/84      BP Location:       Patient Position:       Pulse: 65   66   Resp:   20 17   Temp:   97.4 °F (36.3 °C) 97.8 °F (36.6 °C)   TempSrc:   Oral Oral   SpO2: 95%  98% 97%   Weight:  107 kg (235 lb 0.2 oz)     Height:  188 cm (74\")         Flowsheet Rows      First Filed Value   Admission Height  182.9 cm (72\") Documented at 03/04/2020 0955   Admission Weight  109 kg (240 lb 8.4 oz) Documented at 03/04/2020 0955           TELEMETRY: Sinus rhythm    Physical Exam:  Physical Exam   Constitutional: He is oriented to person, place, and time. He appears well-developed and well-nourished. He is cooperative.   HENT:   Head: Normocephalic and atraumatic.   Mouth/Throat: Uvula is midline and oropharynx is clear and moist. No oral lesions.   Eyes: Conjunctivae are normal. No scleral icterus.   Neck: Trachea normal. Neck supple. No JVD present. Carotid bruit is not present. No thyromegaly present.   Cardiovascular: Normal rate, regular rhythm, S1 normal, S2 normal, normal heart sounds, intact distal pulses and normal pulses. PMI is not displaced. Exam reveals no gallop and no friction rub.   No murmur heard.  Pulmonary/Chest: Effort normal and breath sounds normal.   Abdominal: Soft. Bowel sounds are normal.   Musculoskeletal: Normal range of motion.   Neurological: He is alert and oriented to person, place, and time. He has normal strength.   No focal " deficits   Skin: Skin is warm. No cyanosis.   Psychiatric: He has a normal mood and affect.                LAB RESULTS (LAST 7 DAYS)    CBC  Results from last 7 days   Lab Units 03/04/20  1037   WBC 10*3/mm3 4.80   RBC 10*6/mm3 5.04   HEMOGLOBIN g/dL 15.5   HEMATOCRIT % 44.8   MCV fL 88.9   PLATELETS 10*3/mm3 183       BMP  Results from last 7 days   Lab Units 03/04/20  1037   SODIUM mmol/L 139   POTASSIUM mmol/L 4.0   CHLORIDE mmol/L 105   CO2 mmol/L 24.0   BUN mg/dL 13   CREATININE mg/dL 1.01   GLUCOSE mg/dL 108*       CMP Results from last 7 days   Lab Units 03/04/20  1037   SODIUM mmol/L 139   POTASSIUM mmol/L 4.0   CHLORIDE mmol/L 105   CO2 mmol/L 24.0   BUN mg/dL 13   CREATININE mg/dL 1.01   GLUCOSE mg/dL 108*         BNP        TROPONIN  Results from last 7 days   Lab Units 03/04/20  1445   TROPONIN T ng/mL <0.010       CoAg        Creatinine Clearance  Estimated Creatinine Clearance: 86.1 mL/min (by C-G formula based on SCr of 1.01 mg/dL).    ABG        Radiology  Xr Chest 1 View    Result Date: 3/4/2020  Stable chest, no active disease  Electronically Signed By-Donal Juárez On:3/4/2020 10:49 AM This report was finalized on [id number] by  Donal Juárez, .          EKG          I personally viewed and interpreted the patient's EKG/Telemetry data:    ECHOCARDIOGRAM:         STRESS MYOVIEW:    CARDIAC CATHETERIZATION:    OTHER:         Assessment/Plan       Unstable angina (CMS/HCC)    Coronary artery disease    Gastroesophageal reflux disease with hiatal hernia    Hyperlipidemia    Hypertension    Obesity (BMI 30-39.9)    Pulmonary sarcoidosis (CMS/HCC)    Seasonal allergies    Insomnia      Symptoms of chest pain/unstable angina  Cardiac enzymes negative we will schedule pharmacological stress Myoview in the morning  History of CAD status post PCI  Continue aggressive medical treatment cardiac risk factor modification    I discussed the patients findings and my recommendations with patient     Trey Bush  MD [place]  03/04/20  7:11 PM

## 2020-03-05 NOTE — PLAN OF CARE
Problem: Patient Care Overview  Goal: Plan of Care Review  Outcome: Ongoing (interventions implemented as appropriate)  Flowsheets (Taken 3/5/2020 3132)  Progress: improving  Plan of Care Reviewed With: patient  Outcome Summary: pt off drip, pt had myoview this morning. await results from Dr. Leigh. no complaints of chest pain. continue to monitor

## 2020-03-05 NOTE — PROGRESS NOTES
Reason for follow-up: chest pain/unstable angina     Patient Care Team:  Rama Quintanilla APRN as PCP - General  Rama Quintanilla APRN as PCP - Family Medicine  Trey Kennedy MD as PCP - Claims Attributed    Subjective .   No more symptoms of chest pain     Review of Systems   Constitution: Negative for fever and malaise/fatigue.   HENT: Negative for congestion and hearing loss.    Eyes: Negative for double vision and visual disturbance.   Cardiovascular: Negative for chest pain, claudication, dyspnea on exertion, leg swelling and syncope.   Respiratory: Negative for cough and shortness of breath.    Endocrine: Negative for cold intolerance.   Skin: Negative for color change and rash.   Musculoskeletal: Negative for arthritis and joint pain.   Gastrointestinal: Negative for abdominal pain and heartburn.   Genitourinary: Negative for hematuria.   Neurological: Negative for excessive daytime sleepiness and dizziness.   Psychiatric/Behavioral: Negative for depression. The patient is not nervous/anxious.    All other systems reviewed and are negative.      Adhesive tape    Scheduled Meds:  aspirin 81 mg Oral Daily   clopidogrel 75 mg Oral Daily   enoxaparin 40 mg Subcutaneous Q24H   lisinopril 5 mg Oral Daily   metoprolol tartrate 12.5 mg Oral Daily   pantoprazole 40 mg Oral Daily   rosuvastatin 40 mg Oral Nightly   sodium chloride 10 mL Intravenous Q12H     Continuous Infusions:  nitroglycerin 10-50 mcg/min Last Rate: Stopped (03/05/20 0318)     PRN Meds:.•  acetaminophen **OR** acetaminophen **OR** acetaminophen  •  doxylamine  •  fluticasone  •  magnesium sulfate **OR** magnesium sulfate in D5W 1g/100mL (PREMIX)  •  ondansetron **OR** ondansetron  •  potassium chloride  •  [COMPLETED] Insert peripheral IV **AND** sodium chloride  •  sodium chloride    Objective   Looks comfortable lying in the bed    VITAL SIGNS  Vitals:    03/05/20 0305 03/05/20 0615 03/05/20 1049 03/05/20 1449   BP: 103/72 116/75  "108/75 125/84   BP Location:  Right arm Right arm Right arm   Patient Position:  Lying Lying Lying   Pulse: 52 59 72 57   Resp:  14 16 15   Temp:  97.8 °F (36.6 °C) 97.8 °F (36.6 °C) 98.1 °F (36.7 °C)   TempSrc:  Oral Oral Oral   SpO2:  97% 95% 96%   Weight:       Height:           Flowsheet Rows      First Filed Value   Admission Height  182.9 cm (72\") Documented at 03/04/2020 0955   Admission Weight  109 kg (240 lb 8.4 oz) Documented at 03/04/2020 0955           TELEMETRY: Sinus rhythm    Physical Exam:  Physical Exam   Constitutional: He is oriented to person, place, and time. He appears well-developed and well-nourished. He is cooperative.   HENT:   Head: Normocephalic and atraumatic.   Mouth/Throat: Uvula is midline and oropharynx is clear and moist. No oral lesions.   Eyes: Conjunctivae are normal. No scleral icterus.   Neck: Trachea normal. Neck supple. No JVD present. Carotid bruit is not present. No thyromegaly present.   Cardiovascular: Normal rate, regular rhythm, S1 normal, S2 normal, normal heart sounds, intact distal pulses and normal pulses. PMI is not displaced. Exam reveals no gallop and no friction rub.   No murmur heard.  Pulmonary/Chest: Effort normal and breath sounds normal.   Abdominal: Soft. Bowel sounds are normal.   Musculoskeletal: Normal range of motion.   Neurological: He is alert and oriented to person, place, and time. He has normal strength.   No focal deficits   Skin: Skin is warm. No cyanosis.   Psychiatric: He has a normal mood and affect.                LAB RESULTS (LAST 7 DAYS)    CBC  Results from last 7 days   Lab Units 03/04/20  1037   WBC 10*3/mm3 4.80   RBC 10*6/mm3 5.04   HEMOGLOBIN g/dL 15.5   HEMATOCRIT % 44.8   MCV fL 88.9   PLATELETS 10*3/mm3 183       BMP  Results from last 7 days   Lab Units 03/05/20  0313 03/04/20  1037   SODIUM mmol/L  --  139   POTASSIUM mmol/L 4.2 4.0   CHLORIDE mmol/L  --  105   CO2 mmol/L  --  24.0   BUN mg/dL  --  13   CREATININE mg/dL  --  " 1.01   GLUCOSE mg/dL  --  108*   MAGNESIUM mg/dL 1.9  --        CMP Results from last 7 days   Lab Units 03/05/20  0313 03/04/20  1037   SODIUM mmol/L  --  139   POTASSIUM mmol/L 4.2 4.0   CHLORIDE mmol/L  --  105   CO2 mmol/L  --  24.0   BUN mg/dL  --  13   CREATININE mg/dL  --  1.01   GLUCOSE mg/dL  --  108*         BNP        TROPONIN  Results from last 7 days   Lab Units 03/05/20  0313   TROPONIN T ng/mL <0.010       CoAg        Creatinine Clearance  Estimated Creatinine Clearance: 86.1 mL/min (by C-G formula based on SCr of 1.01 mg/dL).    ABG        Radiology  Xr Chest 1 View    Result Date: 3/4/2020  Stable chest, no active disease  Electronically Signed By-Donal Juárez On:3/4/2020 10:49 AM This report was finalized on [id number] by  Donal Juárez, .            EKG        I personally viewed and interpreted the patient's EKG/Telemetry data:    ECHOCARDIOGRAM:       STRESS MYOVIEW:    CARDIAC CATHETERIZATION:    OTHER:         Assessment/Plan       Unstable angina (CMS/HCC)    Coronary artery disease    Gastroesophageal reflux disease with hiatal hernia    Hyperlipidemia    Hypertension    Obesity (BMI 30-39.9)    Pulmonary sarcoidosis (CMS/HCC)    Seasonal allergies    Insomnia      History of CAD status post PCI now admitted with symptoms of chest pain/unstable angina  Cardiac enzymes negative  Stress Myoview no ischemia  Patient appears to be stable at this point can be discharged followed as an outpatient  Hypertension dyslipidemia needs to be aggressively controlled monitor closely    I discussed the patients findings and my recommendations with patient and attending nurse    Trey Kennedy MD  03/05/20  5:23 PM

## 2020-03-05 NOTE — PROGRESS NOTES
Discharge Planning Assessment  [facility]     Patient Name: Donald Navarro  MRN: [id number]  Today's Date: 3/5/2020    Admit Date: 3/4/2020    Discharge Needs Assessment     Row Name 03/05/20 1243       Living Environment    Lives With  significant other Patient out of room for testing - spoke to S/O in room     Current Living Arrangements  home/apartment/condo    Primary Care Provided by  self    Able to Return to Prior Arrangements  yes       Resource/Environmental Concerns    Resource/Environmental Concerns  none    Transportation Concerns  car, none       Transition Planning    Patient/Family Anticipates Transition to  home    Patient/Family Anticipated Services at Transition  none    Transportation Anticipated  family or friend will provide;car, drives self       Discharge Needs Assessment    Readmission Within the Last 30 Days  no previous admission in last 30 days    Concerns to be Addressed  no discharge needs identified    Equipment Currently Used at Home  none    Anticipated Changes Related to Illness  none    Equipment Needed After Discharge  none        Discharge Plan     Row Name 03/05/20 1244       Plan    Plan  Routine d/c to home           Expected Discharge Date and Time     Expected Discharge Date Expected Discharge Time    Mar 5, 2020         Demographic Summary     Row Name 03/05/20 1242       General Information    Admission Type  observation    Arrived From  emergency department    Required Notices Provided  Important Message from Medicare    Referral Source  admission list    Reason for Consult  discharge planning    Preferred Language  English        Functional Status     Row Name 03/05/20 1243       Functional Status, IADL    Medications  independent    Meal Preparation  independent    Housekeeping  independent    Laundry  independent    Shopping  independent        DC Barriers - Nitro GTT, Pending Stress  Test    Alexa Sauceda RN, CM  Office Phone [phone]  Cell [phone]

## 2020-03-06 ENCOUNTER — READMISSION MANAGEMENT (OUTPATIENT)
Dept: CALL CENTER | Facility: HOSPITAL | Age: 75
End: 2020-03-06

## 2020-03-06 NOTE — DISCHARGE SUMMARY
[facility] Medicine Services  DISCHARGE SUMMARY        Prepared For PCP:  Rama Quintanilla APRN    Patient Name: Donald Navarro  : 1945  MRN: [id number]      Date of Admission:   3/4/2020    Date of Discharge:  3/5/2020    Length of stay:  LOS: 0 days     Hospital Course     Presenting Problem:   Unstable angina (CMS/HCC) [I20.0]      Active Hospital Problems    Diagnosis  POA   • **Unstable angina (CMS/HCC) [I20.0]  Yes   • Seasonal allergies [J30.2]  Yes   • Insomnia [G47.00]  Yes   • Coronary artery disease [I25.10]  Yes   • Hyperlipidemia [E78.5]  Yes   • Hypertension [I10]  Yes   • Gastroesophageal reflux disease with hiatal hernia [K21.9, K44.9]  Yes   • Obesity (BMI 30-39.9) [E66.9]  Yes   • Pulmonary sarcoidosis (CMS/HCC) [D86.0]  Yes      Resolved Hospital Problems   No resolved problems to display.           Hospital Course:    Mr. Navarro is a 74 y.o.  male with a past medical history of CAD status post PCI, GERD, hyperlipidemia, hypertension, and pulmonary sarcoidosis who presented [facility] on 3/4/2020 with chest pain.  Patient states a couple times the night he had chest pain, however the strongest chest pain he had was at 7:30 this morning.  He described the chest pain is very sharp and radiating down his left arm.  He reported his chest pain was accompanied by slight shortness of breath.  His chest pain was a 4 out of 10 when he presented to [facility] and he states his chest pain is now relieved with the nitro drip.  He denies any aggravating factors.  He is seen by Dr. Reese seay.  He stated he had a heart catheterization 2 years ago and a stress test in 2019.  He denied any recent dizziness, syncope, nausea, vomiting, diarrhea, fever, or chills.     In the ED, chest x-ray unremarkable.  EKG showed sinus bradycardia with rate in the 50.  All labs unremarkable upon admission.  All vital signs stable upon admission except heart  "rate 49-55 and blood pressure 162/95 which has improved.  Patient was started on nitro drip in the ED.    Unstable angina  -Patient states his symptoms have subsided  -Previous stress test on 6/18/2019 showed normal stress test/left ventricular ejection fraction is normal (Calculated EF = 65%).   -Chest x-ray unremarkable.   -  EKG showed sinus bradycardia with rate in the 50.    - Troponin X1, normal- trend   - Continuous cardiac monitoring   - Patient started on nitro gtt in the ED   -Continue home aspirin  -Cardiology consulted cleared for discharge     Essential HTN  - moderately controlled  - Monitor blood pressure   -Continue lisinopril and metoprolol     HLD  -Continue atorvastatin     CAD status post PCI x2 (2018)  - Managed by Dr. Leigh outpatient  -Continue aspirin, rosuvastatin, Plavix, and metoprolol  - Holding home indur while patient on nitro gtt     Pulmonary sarcoidosis   -Patient states this is \"in remission \"  -He is not followed outpatient by pulmonologist     GERD  -Continue pantoprazole     Seasonal allergies  -Continue Flonase     Insomnia  -Continue Unisom     Obesity  - BMI 32.6  - encourage lifestyle modifications     Patient was ruled out and underwent stress my which came all be negative and now being discharged home to follow with PCP as an outpatient.        Recommendation for Outpatient Providers:             Reasons For Change In Medications and Indications for New Medications:        Day of Discharge     HPI:       Vital Signs:   Temp:  [97.8 °F (36.6 °C)-98.1 °F (36.7 °C)] 98.1 °F (36.7 °C)  Heart Rate:  [52-72] 57  Resp:  [14-18] 15  BP: (102-125)/(66-84) 125/84     Physical Exam:  Physical Exam   Cardiovascular: Normal rate and regular rhythm.   Pulmonary/Chest: Effort normal and breath sounds normal.   Abdominal: Soft. Bowel sounds are normal.       Pertinent  and/or Most Recent Results     Results from last 7 days   Lab Units 03/05/20  0313 03/04/20  1037   WBC 10*3/mm3  --  4.80 "   HEMOGLOBIN g/dL  --  15.5   HEMATOCRIT %  --  44.8   PLATELETS 10*3/mm3  --  183   SODIUM mmol/L  --  139   POTASSIUM mmol/L 4.2 4.0   CHLORIDE mmol/L  --  105   CO2 mmol/L  --  24.0   BUN mg/dL  --  13   CREATININE mg/dL  --  1.01   GLUCOSE mg/dL  --  108*   CALCIUM mg/dL  --  9.0           Invalid input(s): PROT, LABALBU        Invalid input(s): TG, LDLCALC, LDLREALC  Results from last 7 days   Lab Units 03/05/20  0313 03/04/20 2028 03/04/20  1445 03/04/20  1037   TROPONIN T ng/mL <0.010 <0.010 <0.010 <0.010       Brief Urine Lab Results     None          Microbiology Results Abnormal     None          Xr Chest 1 View    Result Date: 3/4/2020  Impression: Stable chest, no active disease  Electronically Signed By-Donal Juárez On:3/4/2020 10:49 AM This report was finalized on [id number] by  Donal Juárez, .                             Test Results Pending at Discharge        Procedures Performed           Consults:   Consults     Date and Time Order Name Status Description    3/4/2020 1255 Inpatient Cardiology Consult Completed     3/4/2020 1138 Hospitalist (on-call MD unless specified) Completed             Discharge Details        Discharge Medications      Continue These Medications      Instructions Start Date   aspirin 81 MG tablet   162 mg, Oral, Daily      clopidogrel 75 MG tablet  Commonly known as:  PLAVIX   75 mg, Oral, Daily      fluticasone 50 MCG/ACT nasal spray  Commonly known as:  FLONASE   2 sprays, Nasal, Daily PRN      isosorbide mononitrate 30 MG 24 hr tablet  Commonly known as:  IMDUR   30 mg, Oral, Daily      lisinopril 5 MG tablet  Commonly known as:  PRINIVIL,ZESTRIL   5 mg, Oral, Daily      metoprolol tartrate 25 MG tablet  Commonly known as:  LOPRESSOR   12.5 mg, Oral, Daily      pantoprazole 40 MG EC tablet  Commonly known as:  PROTONIX   20 mg, Oral, 2 Times Daily      rosuvastatin 20 MG tablet  Commonly known as:  CRESTOR   40 mg, Oral, Nightly      TYLENOL PM EXTRA STRENGTH  MG  tablet per tablet  Generic drug:  diphenhydrAMINE-acetaminophen   1 tablet, Oral, Nightly             Allergies   Allergen Reactions   • Adhesive Tape Itching         Discharge Disposition:  Home or Self Care    Diet:  Hospital:No active diet order        Discharge Activity:   Activity Instructions     As tolerated                   CODE STATUS:    Code Status and Medical Interventions:   Ordered at: 03/04/20 1255     Level Of Support Discussed With:    Patient     Code Status:    CPR     Medical Interventions (Level of Support Prior to Arrest):    Full         Follow-up Appointments  No future appointments.          Condition on Discharge:      Stable          Electronically signed by Osvaldo Macario MD, 03/05/20, 8:59 PM.    Time: I spent  20  minutes on this discharge activity which included face-to-face encounter with the patient/reviewing the data in the system/coordination of the care with the nursing staff as well as consultants/documentation/entering orders.

## 2020-03-07 NOTE — OUTREACH NOTE
Prep Survey      Responses   [facility] facility patient discharged from?  [facility]   Is LACE score < 7 ?  No   Eligibility  Readm Mgmt   Discharge diagnosis  chest pain, HTN   Does the patient have one of the following disease processes/diagnoses(primary or secondary)?  Other   Does the patient have Home health ordered?  No   Is there a DME ordered?  No   Prep survey completed?  Yes          Gloira Bragg RN

## 2020-03-10 ENCOUNTER — TELEPHONE (OUTPATIENT)
Dept: CARDIOLOGY | Facility: CLINIC | Age: 75
End: 2020-03-10

## 2020-03-10 ENCOUNTER — READMISSION MANAGEMENT (OUTPATIENT)
Dept: CALL CENTER | Facility: HOSPITAL | Age: 75
End: 2020-03-10

## 2020-03-10 RX ORDER — ISOSORBIDE MONONITRATE 30 MG/1
30 TABLET, EXTENDED RELEASE ORAL DAILY
Qty: 90 TABLET | Refills: 3 | Status: SHIPPED | OUTPATIENT
Start: 2020-03-10 | End: 2021-03-05

## 2020-03-10 NOTE — TELEPHONE ENCOUNTER
[facility]  Isosorbide mono ER 30 mg, takes 1 daily, 90 day supply with refills  Wants Rx done this AM if possible.  Call [phone] if questions.  Sees Dr. Leigh in [place]

## 2020-03-10 NOTE — OUTREACH NOTE
Medical Week 1 Survey      Responses   [facility], [place], operated by [facility] patient discharged from?  [facility]   Does the patient have one of the following disease processes/diagnoses(primary or secondary)?  Other   Is there a successful TCM telephone encounter documented?  No   Week 1 attempt successful?  Yes   Call start time  1333   Call end time  1336   Discharge diagnosis  chest pain, HTN   Does the patient have all medications ordered at discharge?  N/A   Is the patient taking all medications as directed (includes completed medication regime)?  Yes   Does the patient have a primary care provider?   Yes   Does the patient have an appointment with their PCP within 7 days of discharge?  No   What is preventing the patient from scheduling follow up appointments within 7 days of discharge?  Haven't had time   Nursing Interventions  Educated patient on importance of making appointment, Advised patient to make appointment   Has the patient kept scheduled appointments due by today?  N/A   Comments  Patient states he has appt with Dr. Leigh next week and he takes care of all his medications.  He will call and get appt with Rama Quintanilla after his appt with Dr. Leihg   Has home health visited the patient within 72 hours of discharge?  N/A   Did the patient receive a copy of their discharge instructions?  Yes   Nursing interventions  Reviewed instructions with patient   What is the patient's perception of their health status since discharge?  Improving   Is the patient/caregiver able to teach back signs and symptoms related to disease process for when to call PCP?  Yes   Is the patient/caregiver able to teach back signs and symptoms related to disease process for when to call 911?  Yes   Is the patient/caregiver able to teach back the hierarchy of who to call/visit for symptoms/problems? PCP, Specialist, Home health nurse, Urgent Care, ED, 911  Yes   Additional teach back comments  No question or needs at this time   Week 1 call completed?  Yes    Graduated  Yes   Did the patient feel the follow up calls were helpful during their recovery period?  Yes   Was the number of calls appropriate?  Yes   Graduated/Revoked comments  No questions or needs at this time          Alida Mae LPN

## 2020-03-12 ENCOUNTER — OFFICE VISIT (OUTPATIENT)
Dept: CARDIOLOGY | Facility: CLINIC | Age: 75
End: 2020-03-12

## 2020-03-12 VITALS
BODY MASS INDEX: 32.64 KG/M2 | HEART RATE: 61 BPM | OXYGEN SATURATION: 97 % | HEIGHT: 72 IN | DIASTOLIC BLOOD PRESSURE: 76 MMHG | WEIGHT: 241 LBS | SYSTOLIC BLOOD PRESSURE: 120 MMHG

## 2020-03-12 DIAGNOSIS — E78.2 MIXED HYPERLIPIDEMIA: Chronic | ICD-10-CM

## 2020-03-12 DIAGNOSIS — I10 ESSENTIAL HYPERTENSION: Chronic | ICD-10-CM

## 2020-03-12 DIAGNOSIS — I25.10 CORONARY ARTERY DISEASE INVOLVING NATIVE HEART WITHOUT ANGINA PECTORIS, UNSPECIFIED VESSEL OR LESION TYPE: Primary | Chronic | ICD-10-CM

## 2020-03-12 PROCEDURE — 99213 OFFICE O/P EST LOW 20 MIN: CPT | Performed by: INTERNAL MEDICINE

## 2020-03-12 NOTE — PROGRESS NOTES
Subjective:     Encounter Date:03/12/2020      Patient ID: Donald Navarro is a 74 y.o. male.    Chief Complaint: Follow up from recent hospitalization and testing  - CAD - S/P PCI - HTN - Dyslipidemia  History of Present Illness     74 -year-old white male patient with known history of CAD status post PCI comes back for follow up.      patient underwent PCI to LAD Previously.      recent labs showed a lipids well controlled except Elevated triglycerides,  liver enzymes within normal limit, Decreased HDL stable   patient was advised to take fish oil and aggressive diet control      patient was admit to the hospital in January 2018 a stress test showed inferolateral ischemia      January 2018 cardiac catheterization showed left main 0% stenosis proximal LAD up to 40% InStent restenoses mid to distal less than 30% disease circumflex artery less than 30% disease RCA diffuse luminal irregularities less than 30-40% disease is a   dominant artery      stress Myoview June 2019 which showed mostly fixed distal inferolateral defect  LV function normal  Patient was diagnosed with possible hiatal hernia  Advised him to continue to take current medications modify cardiac risk factors aggressively  Patient recently admitted to hospital with symptoms of chest pain underwent repeat stress test March 2020,  which showed no ischemia again distal inferolateral fixed defect noted  Patient stopped Plavix for preop and now he is back  He is doing well without any active symptoms advised cardiac cath if recurrent symptoms otherwise we will see him as scheduled in July    The following portions of the patient's history were reviewed and updated as appropriate: Allergies current medications past family history past medical history past social history past surgical history problem list and review of systems  Past Medical History:   Diagnosis Date   • CHF (congestive heart failure) (CMS/formerly [facility])    • Coronary artery disease    • GERD  "(gastroesophageal reflux disease)    • Hyperlipidemia    • Hypertension    • Myocardial infarction (CMS/HCC)      Past Surgical History:   Procedure Laterality Date   • CARDIAC CATHETERIZATION  01/2007    TIA: proximal LAD   • COLONOSCOPY     • HERNIA REPAIR     • TOTAL HIP ARTHROPLASTY Left      /76 (BP Location: Left arm, Patient Position: Sitting, Cuff Size: Adult)   Pulse 61   Ht 182.9 cm (72\")   Wt 109 kg (241 lb)   SpO2 97%   BMI 32.69 kg/m²   Family History   Problem Relation Age of Onset   • Heart disease Father        Current Outpatient Medications:   •  aspirin 81 MG tablet, Take 162 mg by mouth Daily., Disp: , Rfl:   •  clopidogrel (PLAVIX) 75 MG tablet, Take 1 tablet by mouth Daily., Disp: 90 tablet, Rfl: 1  •  diphenhydrAMINE-acetaminophen (TYLENOL PM EXTRA STRENGTH)  MG tablet per tablet, Take 1 tablet by mouth Every Night., Disp: , Rfl:   •  fluticasone (FLONASE) 50 MCG/ACT nasal spray, 2 sprays into the nostril(s) as directed by provider Daily As Needed for Rhinitis., Disp: , Rfl:   •  isosorbide mononitrate (IMDUR) 30 MG 24 hr tablet, Take 1 tablet by mouth Daily., Disp: 90 tablet, Rfl: 3  •  lisinopril (PRINIVIL,ZESTRIL) 5 MG tablet, Take 1 tablet by mouth Daily., Disp: 90 tablet, Rfl: 1  •  metoprolol tartrate (LOPRESSOR) 25 MG tablet, Take 12.5 mg by mouth Daily., Disp: , Rfl:   •  pantoprazole (PROTONIX) 40 MG EC tablet, Take 20 mg by mouth 2 (Two) Times a Day., Disp: , Rfl: 11  •  rosuvastatin (CRESTOR) 20 MG tablet, Take 40 mg by mouth Every Night., Disp: , Rfl:   Social History     Socioeconomic History   • Marital status:      Spouse name: Not on file   • Number of children: Not on file   • Years of education: Not on file   • Highest education level: Not on file   Tobacco Use   • Smoking status: Never Smoker   • Smokeless tobacco: Never Used   Substance and Sexual Activity   • Alcohol use: No     Frequency: Never   • Drug use: No   • Sexual activity: Defer "     Allergies   Allergen Reactions   • Adhesive Tape Itching     Review of Systems   Constitution: Negative for fever and malaise/fatigue.   HENT: Negative for congestion and hearing loss.    Eyes: Negative for double vision and visual disturbance.   Cardiovascular: Negative for chest pain, claudication, dyspnea on exertion, leg swelling and syncope.   Respiratory: Negative for cough and shortness of breath.    Endocrine: Negative for cold intolerance.   Skin: Negative for color change and rash.   Musculoskeletal: Positive for arthritis. Negative for joint pain.   Gastrointestinal: Positive for heartburn. Negative for abdominal pain.   Genitourinary: Negative for hematuria.   Neurological: Negative for excessive daytime sleepiness and dizziness.   Psychiatric/Behavioral: Negative for depression. The patient is not nervous/anxious.    All other systems reviewed and are negative.             Objective:     Physical Exam   Constitutional: He is oriented to person, place, and time. He appears well-developed and well-nourished. He is cooperative.   HENT:   Head: Normocephalic and atraumatic.   Mouth/Throat: Uvula is midline and oropharynx is clear and moist. No oral lesions.   Eyes: Conjunctivae are normal. No scleral icterus.   Neck: Trachea normal. Neck supple. No JVD present. Carotid bruit is not present. No thyromegaly present.   Cardiovascular: Normal rate, regular rhythm, S1 normal, S2 normal, normal heart sounds, intact distal pulses and normal pulses. PMI is not displaced. Exam reveals no gallop and no friction rub.   No murmur heard.  Pulmonary/Chest: Effort normal and breath sounds normal.   Abdominal: Soft. Bowel sounds are normal.   Musculoskeletal: Normal range of motion.   Neurological: He is alert and oriented to person, place, and time. He has normal strength.   No focal deficits   Skin: Skin is warm. No cyanosis.   Psychiatric: He has a normal mood and affect.       Procedures    Lab Review:        Assessment:          Diagnosis Plan   1. Coronary artery disease involving native heart without angina pectoris, unspecified vessel or lesion type     2. Mixed hyperlipidemia     3. Essential hypertension            Plan:         Coronary artery disease status postPCI currently stable  Continue aggressive control of hypertension dyslipidemia stable

## 2020-07-10 RX ORDER — LISINOPRIL 5 MG/1
TABLET ORAL
Qty: 90 TABLET | Refills: 1 | Status: SHIPPED | OUTPATIENT
Start: 2020-07-10 | End: 2021-01-13

## 2020-07-23 RX ORDER — ROSUVASTATIN CALCIUM 40 MG/1
40 TABLET, COATED ORAL NIGHTLY
Qty: 90 TABLET | Refills: 1 | Status: SHIPPED | OUTPATIENT
Start: 2020-07-23 | End: 2021-01-18

## 2020-07-24 LAB
ALBUMIN SERPL-MCNC: 4.1 G/DL (ref 3.7–4.7)
ALBUMIN/GLOB SERPL: 1.8 {RATIO} (ref 1.2–2.2)
ALP SERPL-CCNC: 76 IU/L (ref 39–117)
ALT SERPL-CCNC: 16 IU/L (ref 0–44)
AST SERPL-CCNC: 21 IU/L (ref 0–40)
BILIRUB SERPL-MCNC: 0.4 MG/DL (ref 0–1.2)
BUN SERPL-MCNC: 19 MG/DL (ref 8–27)
BUN/CREAT SERPL: 18 (ref 10–24)
CALCIUM SERPL-MCNC: 8.9 MG/DL (ref 8.6–10.2)
CHLORIDE SERPL-SCNC: 104 MMOL/L (ref 96–106)
CHOLEST SERPL-MCNC: 132 MG/DL (ref 100–199)
CK BB CFR SERPL ELPH: 0 %
CK MACRO1 CFR SERPL: 0 %
CK MACRO2 CFR SERPL: 0 %
CK MB CFR SERPL ELPH: 0 % (ref 0–3)
CK MM CFR SERPL ELPH: 100 % (ref 97–100)
CK SERPL-CCNC: 67 U/L (ref 41–331)
CO2 SERPL-SCNC: 24 MMOL/L (ref 20–29)
CREAT SERPL-MCNC: 1.08 MG/DL (ref 0.76–1.27)
GLOBULIN SER CALC-MCNC: 2.3 G/DL (ref 1.5–4.5)
GLUCOSE SERPL-MCNC: 100 MG/DL (ref 65–99)
HDLC SERPL-MCNC: 29 MG/DL
LDLC SERPL CALC-MCNC: 64 MG/DL (ref 0–99)
POTASSIUM SERPL-SCNC: 4.3 MMOL/L (ref 3.5–5.2)
PROT SERPL-MCNC: 6.4 G/DL (ref 6–8.5)
SODIUM SERPL-SCNC: 141 MMOL/L (ref 134–144)
TRIGL SERPL-MCNC: 195 MG/DL (ref 0–149)
VLDLC SERPL CALC-MCNC: 39 MG/DL (ref 5–40)

## 2020-07-30 ENCOUNTER — OFFICE VISIT (OUTPATIENT)
Dept: CARDIOLOGY | Facility: CLINIC | Age: 75
End: 2020-07-30

## 2020-07-30 VITALS
OXYGEN SATURATION: 98 % | BODY MASS INDEX: 32.1 KG/M2 | HEIGHT: 72 IN | WEIGHT: 237 LBS | DIASTOLIC BLOOD PRESSURE: 78 MMHG | HEART RATE: 52 BPM | SYSTOLIC BLOOD PRESSURE: 141 MMHG

## 2020-07-30 DIAGNOSIS — I10 ESSENTIAL HYPERTENSION: Chronic | ICD-10-CM

## 2020-07-30 DIAGNOSIS — I25.10 CORONARY ARTERY DISEASE INVOLVING NATIVE HEART WITHOUT ANGINA PECTORIS, UNSPECIFIED VESSEL OR LESION TYPE: Primary | Chronic | ICD-10-CM

## 2020-07-30 DIAGNOSIS — E78.2 MIXED HYPERLIPIDEMIA: Chronic | ICD-10-CM

## 2020-07-30 PROCEDURE — 99213 OFFICE O/P EST LOW 20 MIN: CPT | Performed by: INTERNAL MEDICINE

## 2020-07-30 NOTE — PROGRESS NOTES
Subjective:     Encounter Date:07/30/2020      Patient ID: Donald Navarro is a 75 y.o. male.    Chief Complaint:Follow-up CAD  History of Present Illness     74 -year-old white male patient with known history of CAD status post PCI comes back for follow up.      patient underwent PCI to LAD Previously.      recent labs showed a lipids well controlled except Elevated triglycerides,  liver enzymes within normal limit, Decreased HDL stable   patient was advised to take fish oil and aggressive diet control      patient was admit to the hospital in January 2018 a stress test showed inferolateral ischemia      January 2018 cardiac catheterization showed left main 0% stenosis proximal LAD up to 40% InStent restenoses mid to distal less than 30% disease circumflex artery less than 30% disease RCA diffuse luminal irregularities less than 30-40% disease is a   dominant artery      stress Myoview June 2019 which showed mostly fixed distal inferolateral defect  LV function normal  Patient was diagnosed with possible hiatal hernia  Advised him to continue to take current medications modify cardiac risk factors aggressively  Patient recently admitted to hospital with symptoms of chest pain underwent repeat stress test March 2020,  which showed no ischemia again distal inferolateral fixed defect noted  Patient stopped Plavix for preop and now he is back  He is doing well without any active symptoms advised cardiac cath if recurrent symptoms   Patient comes back for follow-up today doing very well no more symptoms of chest pain his recent labs looks good I will see him back in 6 months with labs and EKG     The following portions of the patient's history were reviewed and updated as appropriate: Allergies current medications past family history past medical history past social history past surgical history problem list and review of systems    The following portions of the patient's history were reviewed and updated as  "appropriate: Allergies current medications past family history past medical history past social history past surgical history problem list and review of systems  Past Medical History:   Diagnosis Date   • CHF (congestive heart failure) (CMS/HCC)    • Coronary artery disease    • GERD (gastroesophageal reflux disease)    • Hyperlipidemia    • Hypertension    • Myocardial infarction (CMS/HCC)      Past Surgical History:   Procedure Laterality Date   • CARDIAC CATHETERIZATION  01/2007    TIA: proximal LAD   • COLONOSCOPY     • HERNIA REPAIR     • TOTAL HIP ARTHROPLASTY Left      /78 (BP Location: Left arm, Patient Position: Sitting, Cuff Size: Adult)   Pulse 52   Ht 182.9 cm (72\")   Wt 108 kg (237 lb)   SpO2 98%   BMI 32.14 kg/m²   Family History   Problem Relation Age of Onset   • Heart disease Father        Current Outpatient Medications:   •  aspirin 81 MG tablet, Take 162 mg by mouth Daily., Disp: , Rfl:   •  clopidogrel (PLAVIX) 75 MG tablet, Take 1 tablet by mouth Daily., Disp: 90 tablet, Rfl: 1  •  diphenhydrAMINE-acetaminophen (TYLENOL PM EXTRA STRENGTH)  MG tablet per tablet, Take 1 tablet by mouth Every Night., Disp: , Rfl:   •  fluticasone (FLONASE) 50 MCG/ACT nasal spray, 2 sprays into the nostril(s) as directed by provider Daily As Needed for Rhinitis., Disp: , Rfl:   •  isosorbide mononitrate (IMDUR) 30 MG 24 hr tablet, Take 1 tablet by mouth Daily., Disp: 90 tablet, Rfl: 3  •  lisinopril (PRINIVIL,ZESTRIL) 5 MG tablet, Take 1 tablet by mouth once daily, Disp: 90 tablet, Rfl: 1  •  metoprolol tartrate (LOPRESSOR) 25 MG tablet, Take 0.5 tablets by mouth Daily., Disp: 45 tablet, Rfl: 3  •  rosuvastatin (CRESTOR) 40 MG tablet, Take 1 tablet by mouth Every Night., Disp: 90 tablet, Rfl: 1  •  pantoprazole (PROTONIX) 40 MG EC tablet, Take 20 mg by mouth 2 (Two) Times a Day., Disp: , Rfl: 11  Social History     Socioeconomic History   • Marital status:      Spouse name: Not on file   • " Number of children: Not on file   • Years of education: Not on file   • Highest education level: Not on file   Tobacco Use   • Smoking status: Never Smoker   • Smokeless tobacco: Never Used   Substance and Sexual Activity   • Alcohol use: No     Frequency: Never   • Drug use: No   • Sexual activity: Defer     Allergies   Allergen Reactions   • Adhesive Tape Itching     Review of Systems   Constitution: Negative for fever and malaise/fatigue.   HENT: Negative for congestion and hearing loss.    Eyes: Negative for double vision and visual disturbance.   Cardiovascular: Negative for chest pain, claudication, dyspnea on exertion, leg swelling and syncope.   Respiratory: Negative for cough and shortness of breath.    Endocrine: Negative for cold intolerance.   Skin: Negative for color change and rash.   Musculoskeletal: Positive for arthritis and joint pain.   Gastrointestinal: Negative for abdominal pain and heartburn.   Genitourinary: Negative for hematuria.   Neurological: Negative for excessive daytime sleepiness and dizziness.   Psychiatric/Behavioral: Negative for depression. The patient is not nervous/anxious.    All other systems reviewed and are negative.             Objective:     Physical Exam   Constitutional: He is oriented to person, place, and time. He appears well-developed and well-nourished. He is cooperative.   HENT:   Head: Normocephalic and atraumatic.   Mouth/Throat: Uvula is midline and oropharynx is clear and moist. No oral lesions.   Eyes: Conjunctivae are normal. No scleral icterus.   Neck: Trachea normal. Neck supple. No JVD present. Carotid bruit is not present. No thyromegaly present.   Cardiovascular: Normal rate, regular rhythm, S1 normal, S2 normal, normal heart sounds, intact distal pulses and normal pulses. PMI is not displaced. Exam reveals no gallop and no friction rub.   No murmur heard.  Pulmonary/Chest: Effort normal and breath sounds normal.   Abdominal: Soft. Bowel sounds are  normal.   Musculoskeletal: Normal range of motion.   Neurological: He is alert and oriented to person, place, and time. He has normal strength.   No focal deficits   Skin: Skin is warm. No cyanosis.   Psychiatric: He has a normal mood and affect.       Procedures    Lab Review:       Assessment:          Diagnosis Plan   1. Coronary artery disease involving native heart without angina pectoris, unspecified vessel or lesion type     2. Mixed hyperlipidemia     3. Essential hypertension            Plan:         CAD status post PCI continue current medical treatment  Aggressive control of hypertension dyslipidemiaStable

## 2020-08-11 RX ORDER — CLOPIDOGREL BISULFATE 75 MG/1
TABLET ORAL
Qty: 90 TABLET | Refills: 0 | Status: SHIPPED | OUTPATIENT
Start: 2020-08-11 | End: 2020-11-10

## 2020-09-17 ENCOUNTER — FLU SHOT (OUTPATIENT)
Dept: FAMILY MEDICINE CLINIC | Facility: CLINIC | Age: 75
End: 2020-09-17

## 2020-09-17 DIAGNOSIS — Z23 NEED FOR INFLUENZA VACCINATION: ICD-10-CM

## 2020-09-17 PROCEDURE — 90694 VACC AIIV4 NO PRSRV 0.5ML IM: CPT | Performed by: NURSE PRACTITIONER

## 2020-09-17 PROCEDURE — G0008 ADMIN INFLUENZA VIRUS VAC: HCPCS | Performed by: NURSE PRACTITIONER

## 2020-11-10 RX ORDER — CLOPIDOGREL BISULFATE 75 MG/1
TABLET ORAL
Qty: 90 TABLET | Refills: 0 | Status: SHIPPED | OUTPATIENT
Start: 2020-11-10 | End: 2021-02-08

## 2021-01-07 ENCOUNTER — OFFICE (OUTPATIENT)
Dept: URBAN - METROPOLITAN AREA CLINIC 64 | Facility: CLINIC | Age: 76
End: 2021-01-07

## 2021-01-07 VITALS
HEART RATE: 65 BPM | DIASTOLIC BLOOD PRESSURE: 78 MMHG | WEIGHT: 236 LBS | HEIGHT: 72 IN | SYSTOLIC BLOOD PRESSURE: 138 MMHG

## 2021-01-07 DIAGNOSIS — K21.9 GASTRO-ESOPHAGEAL REFLUX DISEASE WITHOUT ESOPHAGITIS: ICD-10-CM

## 2021-01-07 DIAGNOSIS — K44.9 DIAPHRAGMATIC HERNIA WITHOUT OBSTRUCTION OR GANGRENE: ICD-10-CM

## 2021-01-07 PROCEDURE — 99213 OFFICE O/P EST LOW 20 MIN: CPT | Performed by: NURSE PRACTITIONER

## 2021-01-07 RX ORDER — SUCRALFATE 1 G/10ML
3000 SUSPENSION ORAL
Qty: 900 | Refills: 2 | Status: COMPLETED
Start: 2021-01-07 | End: 2023-02-09

## 2021-01-13 RX ORDER — LISINOPRIL 5 MG/1
TABLET ORAL
Qty: 90 TABLET | Refills: 0 | Status: SHIPPED | OUTPATIENT
Start: 2021-01-13 | End: 2021-04-09

## 2021-01-18 RX ORDER — ROSUVASTATIN CALCIUM 40 MG/1
TABLET, COATED ORAL
Qty: 90 TABLET | Refills: 0 | Status: SHIPPED | OUTPATIENT
Start: 2021-01-18 | End: 2021-04-19

## 2021-01-18 NOTE — TELEPHONE ENCOUNTER
LOV 07/30/2020 - [facility] PT    LABS 07/23/2020    NEXT OV unable to see future scheduled appts for [facility] patients

## 2021-02-02 LAB
ALBUMIN SERPL-MCNC: 4 G/DL (ref 3.7–4.7)
ALBUMIN/GLOB SERPL: 1.7 {RATIO} (ref 1.2–2.2)
ALP SERPL-CCNC: 78 IU/L (ref 39–117)
ALT SERPL-CCNC: 15 IU/L (ref 0–44)
AMBIG ABBREV CMP14 DEFAULT: NORMAL
AMBIG ABBREV LP DEFAULT: NORMAL
AST SERPL-CCNC: 20 IU/L (ref 0–40)
BILIRUB SERPL-MCNC: 0.6 MG/DL (ref 0–1.2)
BUN SERPL-MCNC: 15 MG/DL (ref 8–27)
BUN/CREAT SERPL: 14 (ref 10–24)
CALCIUM SERPL-MCNC: 8.9 MG/DL (ref 8.6–10.2)
CHLORIDE SERPL-SCNC: 108 MMOL/L (ref 96–106)
CHOLEST SERPL-MCNC: 147 MG/DL (ref 100–199)
CK SERPL-CCNC: 72 U/L (ref 41–331)
CO2 SERPL-SCNC: 23 MMOL/L (ref 20–29)
CREAT SERPL-MCNC: 1.09 MG/DL (ref 0.76–1.27)
GLOBULIN SER CALC-MCNC: 2.3 G/DL (ref 1.5–4.5)
GLUCOSE SERPL-MCNC: 97 MG/DL (ref 65–99)
HDLC SERPL-MCNC: 29 MG/DL
LDLC SERPL CALC-MCNC: 82 MG/DL (ref 0–99)
POTASSIUM SERPL-SCNC: 4.5 MMOL/L (ref 3.5–5.2)
PROT SERPL-MCNC: 6.3 G/DL (ref 6–8.5)
SODIUM SERPL-SCNC: 142 MMOL/L (ref 134–144)
TRIGL SERPL-MCNC: 215 MG/DL (ref 0–149)
VLDLC SERPL CALC-MCNC: 36 MG/DL (ref 5–40)

## 2021-02-04 ENCOUNTER — OFFICE VISIT (OUTPATIENT)
Dept: CARDIOLOGY | Facility: CLINIC | Age: 76
End: 2021-02-04

## 2021-02-04 VITALS
HEART RATE: 69 BPM | SYSTOLIC BLOOD PRESSURE: 132 MMHG | WEIGHT: 240 LBS | OXYGEN SATURATION: 97 % | HEIGHT: 72 IN | BODY MASS INDEX: 32.51 KG/M2 | DIASTOLIC BLOOD PRESSURE: 83 MMHG

## 2021-02-04 DIAGNOSIS — E78.00 PURE HYPERCHOLESTEROLEMIA: Chronic | ICD-10-CM

## 2021-02-04 DIAGNOSIS — I10 ESSENTIAL HYPERTENSION: Primary | Chronic | ICD-10-CM

## 2021-02-04 DIAGNOSIS — I25.10 CORONARY ARTERY DISEASE INVOLVING NATIVE CORONARY ARTERY OF NATIVE HEART WITHOUT ANGINA PECTORIS: Chronic | ICD-10-CM

## 2021-02-04 DIAGNOSIS — I51.9 CARDIAC DISEASE: ICD-10-CM

## 2021-02-04 PROCEDURE — 99214 OFFICE O/P EST MOD 30 MIN: CPT | Performed by: INTERNAL MEDICINE

## 2021-02-04 PROCEDURE — 93010 ELECTROCARDIOGRAM REPORT: CPT | Performed by: INTERNAL MEDICINE

## 2021-02-04 RX ORDER — SUCRALFATE 1 G/1
TABLET ORAL
COMMUNITY
Start: 2021-01-08 | End: 2021-08-05

## 2021-02-04 NOTE — PROGRESS NOTES
Subjective:     Encounter Date:02/04/2021      Patient ID: Donald Navarro is a 75 y.o. male.    Chief Complaint: CAD - CHF - HTN - Dyslipidemia - S/P PCI  History of Present Illness     74 -year-old white male patient with known history of CAD status post PCI comes back for follow up.      patient underwent PCI to LAD Previously.      recent labs showed a lipids well controlled except Elevated triglycerides 215 and HDL is 29  liver enzymes within normal limit, Decreased HDL stable   patient was advised to take fish oil and aggressive diet control        January 2018 cardiac catheterization showed left main 0% stenosis proximal LAD up to 40% InStent restenoses mid to distal less than 30% disease circumflex artery less than 30% disease RCA diffuse luminal irregularities less than 30-40% disease is a   dominant artery       Patient  admitted to hospital with symptoms of chest pain underwent repeat stress test March 2020,  which showed no ischemia again distal inferolateral fixed defect noted    Patient comes back for follow-up today doing very well no more symptoms of chest pain his recent labs looks good I will see him back in 6 months with labs   EKG today normal sinus rhythm no significant changes compared to the last EKG    The following portions of the patient's history were reviewed and updated as appropriate: Allergies current medications past family history past medical history past social history past surgical history problem list and review of systems  Past Medical History:   Diagnosis Date   • CHF (congestive heart failure) (CMS/HCC)    • Coronary artery disease    • GERD (gastroesophageal reflux disease)    • Hyperlipidemia    • Hypertension    • Myocardial infarction (CMS/HCC)      Past Surgical History:   Procedure Laterality Date   • CARDIAC CATHETERIZATION  01/2007    TIA: proximal LAD   • COLONOSCOPY     • HERNIA REPAIR     • TOTAL HIP ARTHROPLASTY Left      /83 (BP Location: Left arm,  "Patient Position: Sitting, Cuff Size: Adult)   Pulse 69   Ht 182.9 cm (72\")   Wt 109 kg (240 lb)   SpO2 97%   BMI 32.55 kg/m²   Family History   Problem Relation Age of Onset   • Heart disease Father        Current Outpatient Medications:   •  aspirin 81 MG tablet, Take 162 mg by mouth Daily., Disp: , Rfl:   •  clopidogrel (PLAVIX) 75 MG tablet, Take 1 tablet by mouth once daily, Disp: 90 tablet, Rfl: 0  •  diphenhydrAMINE-acetaminophen (TYLENOL PM EXTRA STRENGTH)  MG tablet per tablet, Take 1 tablet by mouth Every Night., Disp: , Rfl:   •  fluticasone (FLONASE) 50 MCG/ACT nasal spray, 2 sprays into the nostril(s) as directed by provider Daily As Needed for Rhinitis., Disp: , Rfl:   •  isosorbide mononitrate (IMDUR) 30 MG 24 hr tablet, Take 1 tablet by mouth Daily., Disp: 90 tablet, Rfl: 3  •  lisinopril (PRINIVIL,ZESTRIL) 5 MG tablet, Take 1 tablet by mouth once daily, Disp: 90 tablet, Rfl: 0  •  metoprolol tartrate (LOPRESSOR) 25 MG tablet, Take 0.5 tablets by mouth Daily., Disp: 45 tablet, Rfl: 3  •  pantoprazole (PROTONIX) 40 MG EC tablet, Take 20 mg by mouth 2 (Two) Times a Day., Disp: , Rfl: 11  •  rosuvastatin (CRESTOR) 40 MG tablet, TAKE 1 TABLET BY MOUTH ONCE DAILY AT NIGHT, Disp: 90 tablet, Rfl: 0  •  sucralfate (CARAFATE) 1 g tablet, TAKE 1 TABLET BY MOUTH THREE TIMES DAILY BEFORE MEAL(S) DISSOLVE IN WATER., Disp: , Rfl:   Social History     Socioeconomic History   • Marital status:      Spouse name: Not on file   • Number of children: Not on file   • Years of education: Not on file   • Highest education level: Not on file   Tobacco Use   • Smoking status: Never Smoker   • Smokeless tobacco: Never Used   Substance and Sexual Activity   • Alcohol use: No     Frequency: Never   • Drug use: No   • Sexual activity: Defer     Allergies   Allergen Reactions   • Adhesive Tape Itching     Review of Systems   Constitution: Negative for fever and malaise/fatigue.   HENT: Positive for hearing loss. " Negative for congestion.    Eyes: Negative for double vision and visual disturbance.   Cardiovascular: Positive for dyspnea on exertion. Negative for chest pain, claudication, leg swelling and syncope.   Respiratory: Negative for cough and shortness of breath.    Endocrine: Negative for cold intolerance.   Skin: Negative for color change and rash.   Musculoskeletal: Positive for arthritis and joint pain.   Gastrointestinal: Negative for abdominal pain and heartburn.   Genitourinary: Negative for hematuria.   Neurological: Negative for excessive daytime sleepiness and dizziness.   Psychiatric/Behavioral: Negative for depression. The patient is not nervous/anxious.    All other systems reviewed and are negative.             Objective:     Physical Exam  Patient vital stable alert not in any acute distress neck no JVP elevation lungs clear heart sounds S1-S2 regular extremities no edema bilateral pulses present equal  Procedures    Lab Review:       Assessment:          Diagnosis Plan   1. Essential hypertension     2. Cardiac disease     3. Coronary artery disease involving native coronary artery of native heart without angina pectoris     4. Pure hypercholesterolemia            Plan:       MDM  Number of Diagnoses or Management Options  Cardiac disease: established, improving  Coronary artery disease involving native coronary artery of native heart without angina pectoris: established, improving  Essential hypertension: established, improving  Pure hypercholesterolemia: established, improving     Amount and/or Complexity of Data Reviewed  Clinical lab tests: ordered and reviewed  Review and summarize past medical records: yes  Independent visualization of images, tracings, or specimens: yes    Risk of Complications, Morbidity, and/or Mortality  Presenting problems: moderate  Management options: moderate    Patient Progress  Patient progress: stable    Cardiac wise stable denies of any active symptoms continue current  medications follow-up labs again in 6 months

## 2021-02-08 RX ORDER — CLOPIDOGREL BISULFATE 75 MG/1
TABLET ORAL
Qty: 90 TABLET | Refills: 0 | Status: SHIPPED | OUTPATIENT
Start: 2021-02-08 | End: 2021-05-10

## 2021-03-05 RX ORDER — ISOSORBIDE MONONITRATE 30 MG/1
TABLET, EXTENDED RELEASE ORAL
Qty: 90 TABLET | Refills: 0 | Status: SHIPPED | OUTPATIENT
Start: 2021-03-05 | End: 2021-06-02

## 2021-03-08 ENCOUNTER — OFFICE (OUTPATIENT)
Dept: URBAN - METROPOLITAN AREA CLINIC 64 | Facility: CLINIC | Age: 76
End: 2021-03-08

## 2021-03-08 VITALS
HEIGHT: 72 IN | SYSTOLIC BLOOD PRESSURE: 125 MMHG | WEIGHT: 242 LBS | DIASTOLIC BLOOD PRESSURE: 77 MMHG | HEART RATE: 54 BPM

## 2021-03-08 DIAGNOSIS — K21.9 GASTRO-ESOPHAGEAL REFLUX DISEASE WITHOUT ESOPHAGITIS: ICD-10-CM

## 2021-03-08 DIAGNOSIS — K44.9 DIAPHRAGMATIC HERNIA WITHOUT OBSTRUCTION OR GANGRENE: ICD-10-CM

## 2021-03-08 PROCEDURE — 99213 OFFICE O/P EST LOW 20 MIN: CPT | Performed by: NURSE PRACTITIONER

## 2021-04-09 RX ORDER — LISINOPRIL 5 MG/1
TABLET ORAL
Qty: 90 TABLET | Refills: 0 | Status: SHIPPED | OUTPATIENT
Start: 2021-04-09 | End: 2021-07-09

## 2021-04-19 RX ORDER — ROSUVASTATIN CALCIUM 40 MG/1
TABLET, COATED ORAL
Qty: 90 TABLET | Refills: 3 | Status: SHIPPED | OUTPATIENT
Start: 2021-04-19 | End: 2022-04-18 | Stop reason: SDUPTHER

## 2021-04-19 NOTE — TELEPHONE ENCOUNTER
LOV 02/04/2021 - [facility] PT    LABS 02/01/2021    NEXT OV unable to see future scheduled appts for [facility] pts; however, pt is to follow-up in 6 months

## 2021-05-10 RX ORDER — CLOPIDOGREL BISULFATE 75 MG/1
TABLET ORAL
Qty: 90 TABLET | Refills: 3 | Status: SHIPPED | OUTPATIENT
Start: 2021-05-10 | End: 2022-05-06 | Stop reason: SDUPTHER

## 2021-05-10 NOTE — TELEPHONE ENCOUNTER
Last office visit 02/04/2021 - [facility] Pt    Labs 02/01/2021    Next office visit unable to see future [facility] appointments, but patient is to follow-up in 6 months with labs.

## 2021-06-02 RX ORDER — ISOSORBIDE MONONITRATE 30 MG/1
TABLET, EXTENDED RELEASE ORAL
Qty: 90 TABLET | Refills: 0 | Status: SHIPPED | OUTPATIENT
Start: 2021-06-02 | End: 2021-09-01

## 2021-07-09 RX ORDER — LISINOPRIL 5 MG/1
TABLET ORAL
Qty: 90 TABLET | Refills: 0 | Status: SHIPPED | OUTPATIENT
Start: 2021-07-09 | End: 2021-10-05

## 2021-08-05 ENCOUNTER — OFFICE VISIT (OUTPATIENT)
Dept: CARDIOLOGY | Facility: CLINIC | Age: 76
End: 2021-08-05

## 2021-08-05 VITALS
SYSTOLIC BLOOD PRESSURE: 134 MMHG | BODY MASS INDEX: 32.51 KG/M2 | WEIGHT: 240 LBS | HEIGHT: 72 IN | OXYGEN SATURATION: 98 % | DIASTOLIC BLOOD PRESSURE: 80 MMHG | HEART RATE: 54 BPM

## 2021-08-05 DIAGNOSIS — R06.09 DYSPNEA ON EXERTION: ICD-10-CM

## 2021-08-05 DIAGNOSIS — I20.9 ANGINA PECTORIS (HCC): ICD-10-CM

## 2021-08-05 DIAGNOSIS — E78.00 PURE HYPERCHOLESTEROLEMIA: Chronic | ICD-10-CM

## 2021-08-05 DIAGNOSIS — I25.10 CORONARY ARTERY DISEASE INVOLVING NATIVE CORONARY ARTERY OF NATIVE HEART WITHOUT ANGINA PECTORIS: Primary | Chronic | ICD-10-CM

## 2021-08-05 DIAGNOSIS — I10 ESSENTIAL HYPERTENSION: Chronic | ICD-10-CM

## 2021-08-05 PROCEDURE — 99214 OFFICE O/P EST MOD 30 MIN: CPT | Performed by: INTERNAL MEDICINE

## 2021-08-05 NOTE — PROGRESS NOTES
Subjective:     Encounter Date:08/05/2021      Patient ID: Donald Navarro is a 76 y.o. male.    Chief Complaint: 6 mo follow up - CAD - HTN - CHF - Dyslipidemia  History of Present Illness    76 -year-old white male patient with known history of CAD status post PCI comes back for follow up.      patient underwent PCI to LAD Previously.      recent labs showed a lipids well controlled except Elevated triglycerides 215 and HDL is 29  liver enzymes within normal limit, Decreased HDL stable   patient was advised to take fish oil and aggressive diet control         January 2018 cardiac catheterization showed left main 0% stenosis proximal LAD up to 40% InStent restenoses mid to distal less than 30% disease circumflex artery less than 30% disease RCA diffuse luminal irregularities less than 30-40% disease is a   dominant artery        Patient  admitted to hospital with symptoms of chest pain underwent repeat stress test March 2020,  which showed no ischemia again distal inferolateral fixed defect noted     Patient comes back for follow-up today doing very well no more symptoms of chest pain his recent labs looks good I will see him back in 6 months with labs   EKG today normal sinus rhythm no significant changes compared to the last EKG    Patient comes back for follow-up in the last few weeks he started having some increasing shortness of breath with exertion and also chest heaviness  Denies of any symptoms today I suggested stress test and echocardiogram in the near future    The following portions of the patient's history were reviewed and updated as appropriate: Allergies current medications past family history past medical history past social history past surgical history problem list and review of systems  Past Medical History:   Diagnosis Date   • CHF (congestive heart failure) (CMS/[facility])    • Coronary artery disease    • GERD (gastroesophageal reflux disease)    • Hyperlipidemia    • Hypertension    •  "Myocardial infarction (CMS/HCC)      Past Surgical History:   Procedure Laterality Date   • CARDIAC CATHETERIZATION  01/2007    TIA: proximal LAD   • COLONOSCOPY     • HERNIA REPAIR     • TOTAL HIP ARTHROPLASTY Left      /80 (BP Location: Left arm, Patient Position: Sitting, Cuff Size: Adult)   Pulse 54   Ht 182.9 cm (72\")   Wt 109 kg (240 lb)   SpO2 98%   BMI 32.55 kg/m²   Family History   Problem Relation Age of Onset   • Heart disease Father        Current Outpatient Medications:   •  aspirin 81 MG tablet, Take 162 mg by mouth Daily., Disp: , Rfl:   •  clopidogrel (PLAVIX) 75 MG tablet, Take 1 tablet by mouth once daily, Disp: 90 tablet, Rfl: 3  •  diphenhydrAMINE-acetaminophen (TYLENOL PM EXTRA STRENGTH)  MG tablet per tablet, Take 1 tablet by mouth Every Night., Disp: , Rfl:   •  fluticasone (FLONASE) 50 MCG/ACT nasal spray, 2 sprays into the nostril(s) as directed by provider Daily As Needed for Rhinitis., Disp: , Rfl:   •  isosorbide mononitrate (IMDUR) 30 MG 24 hr tablet, Take 1 tablet by mouth once daily, Disp: 90 tablet, Rfl: 0  •  lisinopril (PRINIVIL,ZESTRIL) 5 MG tablet, Take 1 tablet by mouth once daily, Disp: 90 tablet, Rfl: 0  •  metoprolol tartrate (LOPRESSOR) 25 MG tablet, Take 1/2 (one-half) tablet by mouth once daily, Disp: 45 tablet, Rfl: 0  •  pantoprazole (PROTONIX) 40 MG EC tablet, Take 20 mg by mouth 2 (Two) Times a Day., Disp: , Rfl: 11  •  rosuvastatin (CRESTOR) 40 MG tablet, TAKE 1 TABLET BY MOUTH ONCE DAILY AT NIGHT, Disp: 90 tablet, Rfl: 3  Social History     Socioeconomic History   • Marital status:      Spouse name: Not on file   • Number of children: Not on file   • Years of education: Not on file   • Highest education level: Not on file   Tobacco Use   • Smoking status: Never Smoker   • Smokeless tobacco: Never Used   Substance and Sexual Activity   • Alcohol use: No   • Drug use: No   • Sexual activity: Defer     Allergies   Allergen Reactions   • Adhesive " Tape Itching     Review of Systems   Constitutional: Negative for fever and malaise/fatigue.   HENT: Negative for congestion and hearing loss.    Eyes: Negative for double vision and visual disturbance.   Cardiovascular: Positive for dyspnea on exertion. Negative for chest pain, claudication, leg swelling and syncope.   Respiratory: Negative for cough and shortness of breath.    Endocrine: Negative for cold intolerance.   Skin: Negative for color change and rash.   Musculoskeletal: Positive for arthritis and joint pain.   Gastrointestinal: Positive for heartburn. Negative for abdominal pain.   Genitourinary: Negative for hematuria.   Neurological: Negative for excessive daytime sleepiness and dizziness.   Psychiatric/Behavioral: Negative for depression. The patient is not nervous/anxious.    All other systems reviewed and are negative.             Objective:     Constitutional:       Appearance: Well-developed.   Eyes:      General: No scleral icterus.     Conjunctiva/sclera: Conjunctivae normal.   HENT:      Head: Normocephalic and atraumatic.    Mouth/Throat:      Mouth: No oral lesions.      Pharynx: Uvula midline.   Neck:      Thyroid: No thyromegaly.      Vascular: No carotid bruit or JVD.      Trachea: Trachea normal.   Pulmonary:      Effort: Pulmonary effort is normal.      Breath sounds: Normal breath sounds.   Cardiovascular:      Normal rate. Regular rhythm.      No gallop.   Pulses:     Intact distal pulses.   Abdominal:      General: Bowel sounds are normal.      Palpations: Abdomen is soft.   Musculoskeletal: Normal range of motion.      Cervical back: Neck supple. Skin:     General: Skin is warm. There is no cyanosis.   Neurological:      Mental Status: Alert and oriented to person, place, and time.      Comments: No focal deficits   Psychiatric:         Behavior: Behavior is cooperative.         Procedures    Lab Review:       Assessment:          Diagnosis Plan   1. Coronary artery disease involving  native coronary artery of native heart without angina pectoris     2. Pure hypercholesterolemia     3. Essential hypertension     4. Angina pectoris (CMS/HCC)     5. Dyspnea on exertion            Plan:       MDM  Number of Diagnoses or Management Options  Angina pectoris (CMS/HCC): new, needed workup  Coronary artery disease involving native coronary artery of native heart without angina pectoris: established, worsening  Dyspnea on exertion: established, worsening  Essential hypertension: established, improving  Pure hypercholesterolemia: established, improving     Amount and/or Complexity of Data Reviewed  Clinical lab tests: ordered and reviewed  Review and summarize past medical records: yes    Risk of Complications, Morbidity, and/or Mortality  Presenting problems: moderate  Diagnostic procedures: moderate  Management options: moderate    Patient Progress  Patient progress: other (comment)

## 2021-08-26 ENCOUNTER — OUTSIDE FACILITY SERVICE (OUTPATIENT)
Dept: CARDIOLOGY | Facility: CLINIC | Age: 76
End: 2021-08-26

## 2021-08-26 PROCEDURE — 78452 HT MUSCLE IMAGE SPECT MULT: CPT | Performed by: INTERNAL MEDICINE

## 2021-08-26 PROCEDURE — 93018 CV STRESS TEST I&R ONLY: CPT | Performed by: INTERNAL MEDICINE

## 2021-08-26 PROCEDURE — 93016 CV STRESS TEST SUPVJ ONLY: CPT | Performed by: INTERNAL MEDICINE

## 2021-08-26 PROCEDURE — 93306 TTE W/DOPPLER COMPLETE: CPT | Performed by: INTERNAL MEDICINE

## 2021-09-01 RX ORDER — ISOSORBIDE MONONITRATE 30 MG/1
TABLET, EXTENDED RELEASE ORAL
Qty: 90 TABLET | Refills: 0 | Status: SHIPPED | OUTPATIENT
Start: 2021-09-01 | End: 2021-12-03 | Stop reason: SDUPTHER

## 2021-09-01 NOTE — TELEPHONE ENCOUNTER
Rx Refill Note  Requested Prescriptions     Pending Prescriptions Disp Refills   • isosorbide mononitrate (IMDUR) 30 MG 24 hr tablet [Pharmacy Med Name: Isosorbide Mononitrate ER 30 MG Oral Tablet Extended Release 24 Hour] 90 tablet 0     Sig: Take 1 tablet by mouth once daily      Last office visit with prescribing clinician: 8/5/2021      Next office visit with prescribing clinician: f/u in [place]     SCANNED - LABS (08/02/2021)         Zarina Kenney MA  09/01/21, 10:18 EDT

## 2021-10-05 RX ORDER — LISINOPRIL 5 MG/1
TABLET ORAL
Qty: 90 TABLET | Refills: 0 | Status: SHIPPED | OUTPATIENT
Start: 2021-10-05 | End: 2021-12-01

## 2021-10-05 NOTE — TELEPHONE ENCOUNTER
Rx Refill Note  Requested Prescriptions     Pending Prescriptions Disp Refills   • metoprolol tartrate (LOPRESSOR) 25 MG tablet [Pharmacy Med Name: Metoprolol Tartrate 25 MG Oral Tablet] 45 tablet 0     Sig: Take 1/2 (one-half) tablet by mouth once daily   • lisinopril (PRINIVIL,ZESTRIL) 5 MG tablet [Pharmacy Med Name: Lisinopril 5 MG Oral Tablet] 90 tablet 0     Sig: Take 1 tablet by mouth once daily      Last office visit with prescribing clinician: 8/5/2021      Next office visit with prescribing clinician: f/u audra Zamora     SCANNED - LABS (08/02/2021)         Zarina Kenney MA  10/05/21, 12:15 EDT

## 2021-11-03 ENCOUNTER — OFFICE VISIT (OUTPATIENT)
Dept: CARDIOLOGY | Facility: CLINIC | Age: 76
End: 2021-11-03

## 2021-11-03 VITALS
SYSTOLIC BLOOD PRESSURE: 143 MMHG | WEIGHT: 245.4 LBS | HEIGHT: 72 IN | HEART RATE: 67 BPM | OXYGEN SATURATION: 96 % | BODY MASS INDEX: 33.24 KG/M2 | DIASTOLIC BLOOD PRESSURE: 92 MMHG

## 2021-11-03 DIAGNOSIS — I25.10 CORONARY ARTERY DISEASE INVOLVING NATIVE CORONARY ARTERY OF NATIVE HEART WITHOUT ANGINA PECTORIS: Chronic | ICD-10-CM

## 2021-11-03 DIAGNOSIS — I20.0 UNSTABLE ANGINA PECTORIS (HCC): ICD-10-CM

## 2021-11-03 DIAGNOSIS — I10 PRIMARY HYPERTENSION: Chronic | ICD-10-CM

## 2021-11-03 DIAGNOSIS — E78.2 MIXED HYPERLIPIDEMIA: Primary | Chronic | ICD-10-CM

## 2021-11-03 DIAGNOSIS — I51.9 CARDIAC DISEASE: ICD-10-CM

## 2021-11-03 PROCEDURE — 93010 ELECTROCARDIOGRAM REPORT: CPT | Performed by: INTERNAL MEDICINE

## 2021-11-03 PROCEDURE — 99214 OFFICE O/P EST MOD 30 MIN: CPT | Performed by: INTERNAL MEDICINE

## 2021-11-03 NOTE — PROGRESS NOTES
HP      Name: Donald Navarro ADMIT: (Not on file)   : 1945  PCP: Rama Quintanilla APRN    MRN: [id number] LOS: 0 days   AGE/SEX: 76 y.o. male  ROOM: Room/bed info not found     Chief Complaint   Patient presents with   • Coronary Artery Disease     3 mo f/u       Subjective         History of present illness  Donald Navarro is a 76-year-old male patient with a history of coronary artery disease status post previous PCI to the LAD, also hypertension lipidemia is today for follow-up. Patient is doing well denies having any chest pain or shortness of breath no palpitations or extremity edema no syncopal episodes. Overall is feeling well.    Past Medical History:   Diagnosis Date   • CHF (congestive heart failure) (HCC)    • Coronary artery disease    • GERD (gastroesophageal reflux disease)    • Hyperlipidemia    • Hypertension    • Myocardial infarction (HCC)      Past Surgical History:   Procedure Laterality Date   • CARDIAC CATHETERIZATION  2007    TIA: proximal LAD   • COLONOSCOPY     • HERNIA REPAIR     • TOTAL HIP ARTHROPLASTY Left      Family History   Problem Relation Age of Onset   • Heart disease Father      Social History     Tobacco Use   • Smoking status: Never Smoker   • Smokeless tobacco: Never Used   Vaping Use   • Vaping Use: Never used   Substance Use Topics   • Alcohol use: No   • Drug use: No     (Not in a hospital admission)    Allergies:  Adhesive tape      Physical Exam  VITALS REVIEWED    General:      well developed, in no acute distress.    Head:      normocephalic and atraumatic.    Eyes:      PERRL/EOM intact, conjunctiva and sclera clear with out nystagmus.    Neck:      no masses, thyromegaly,  trachea central with normal respiratory effort and PMI displaced laterally  Lungs:      Clear to auscultation bilaterally  Heart:       Regular rate and rhythm  Msk:      no deformity or scoliosis noted of thoracic or lumbar spine.    Pulses:      pulses normal in all 4 extremities.     Extremities:       No lower extremity edema  Neurologic:      no focal deficits.   alert oriented x3  Skin:      intact without lesions or rashes.    Psych:      alert and cooperative; normal mood and affect; normal attention span and concentration.      Result Review :                Pertinent cardiac workup    1. EKG 11/3/2021 sinus rhythm normal EKG.  2. Stress test on 8/12/2021 mild defect in the inferior apical region, ejection 64%  3. Echocardiogram 8/12/2021 ejection fraction 60%, mild aortic regurgitation aortic root 4.1 cm.  4. Heart catheterization January 2018 40% in-stent restenosis LAD also 30% RCA and circumflex.      Procedures        Assessment and Plan      Donald Navarro is a 76-year-old male patient with history of coronary artery disease status post PCI as detailed above, patient had a mildly low stress test in August 2021 but he is on good therapy for coronary artery disease and angina and he is not experiencing any significant chest pain and no CHF symptoms. At this time I will continue therapy. His cholesterol profile is good and his EKG today shows normal sinus rhythm. I will see him in 6 months for follow-up if he develops chest pain download done he would need another heart catheterization.    Diagnoses and all orders for this visit:    1. Mixed hyperlipidemia (Primary)    2. Cardiac disease    3. Coronary artery disease involving native coronary artery of native heart without angina pectoris    4. Unstable angina pectoris (HCC)    5. Primary hypertension           No follow-ups on file.  Patient was given instructions and counseling regarding his condition or for health maintenance advice. Please see specific information pulled into the AVS if appropriate.

## 2021-12-01 RX ORDER — LISINOPRIL 5 MG/1
TABLET ORAL
Qty: 90 TABLET | Refills: 3 | Status: SHIPPED | OUTPATIENT
Start: 2021-12-01 | End: 2023-01-06

## 2021-12-01 NOTE — TELEPHONE ENCOUNTER
Rx Refill Note  Requested Prescriptions     Pending Prescriptions Disp Refills   • lisinopril (PRINIVIL,ZESTRIL) 5 MG tablet [Pharmacy Med Name: Lisinopril 5 MG Oral Tablet] 90 tablet 0     Sig: Take 1 tablet by mouth once daily      Last office visit with prescribing clinician: 11/3/2021      Next office visit with prescribing clinician: mima/u in [place]  12/01/21, 07:42 EST

## 2021-12-03 RX ORDER — ISOSORBIDE MONONITRATE 30 MG/1
30 TABLET, EXTENDED RELEASE ORAL DAILY
Qty: 90 TABLET | Refills: 3 | Status: SHIPPED | OUTPATIENT
Start: 2021-12-03 | End: 2022-07-20

## 2021-12-03 NOTE — TELEPHONE ENCOUNTER
Rx Refill Note  Requested Prescriptions      No prescriptions requested or ordered in this encounter      Last office visit with prescribing clinician: 11/3/2021      Next office visit with prescribing clinician: f/u in [place] MA  12/03/21, 13:46 EST

## 2021-12-28 NOTE — TELEPHONE ENCOUNTER
Rx Refill Note  Requested Prescriptions     Pending Prescriptions Disp Refills   • metoprolol tartrate (LOPRESSOR) 25 MG tablet [Pharmacy Med Name: Metoprolol Tartrate 25 MG Oral Tablet] 45 tablet 0     Sig: Take 1/2 (one-half) tablet by mouth once daily      Last office visit with prescribing clinician: 11/3/2021      Next office visit with prescribing clinician: f/u in [place]     SCANNED - LABS (08/02/2021)         Marlena Nelson MA  12/28/21, 10:59 EST

## 2022-02-01 ENCOUNTER — OFFICE VISIT (OUTPATIENT)
Dept: FAMILY MEDICINE CLINIC | Facility: CLINIC | Age: 77
End: 2022-02-01

## 2022-02-01 VITALS
OXYGEN SATURATION: 94 % | TEMPERATURE: 97.3 F | SYSTOLIC BLOOD PRESSURE: 143 MMHG | HEART RATE: 87 BPM | BODY MASS INDEX: 32.56 KG/M2 | HEIGHT: 72 IN | WEIGHT: 240.4 LBS | DIASTOLIC BLOOD PRESSURE: 84 MMHG

## 2022-02-01 DIAGNOSIS — J02.9 SORE THROAT: ICD-10-CM

## 2022-02-01 DIAGNOSIS — Z00.00 PREVENTATIVE HEALTH CARE: ICD-10-CM

## 2022-02-01 DIAGNOSIS — E66.9 OBESITY (BMI 30-39.9): ICD-10-CM

## 2022-02-01 DIAGNOSIS — K44.9 GASTROESOPHAGEAL REFLUX DISEASE WITH HIATAL HERNIA: Chronic | ICD-10-CM

## 2022-02-01 DIAGNOSIS — E78.2 MIXED HYPERLIPIDEMIA: Primary | ICD-10-CM

## 2022-02-01 DIAGNOSIS — Z12.5 SCREENING PSA (PROSTATE SPECIFIC ANTIGEN): ICD-10-CM

## 2022-02-01 DIAGNOSIS — K21.9 GASTROESOPHAGEAL REFLUX DISEASE WITH HIATAL HERNIA: Chronic | ICD-10-CM

## 2022-02-01 DIAGNOSIS — J30.2 SEASONAL ALLERGIES: Chronic | ICD-10-CM

## 2022-02-01 LAB
EXPIRATION DATE: NORMAL
INTERNAL CONTROL: NORMAL
Lab: NORMAL
S PYO AG THROAT QL: NEGATIVE

## 2022-02-01 PROCEDURE — 99213 OFFICE O/P EST LOW 20 MIN: CPT | Performed by: NURSE PRACTITIONER

## 2022-02-01 PROCEDURE — 87880 STREP A ASSAY W/OPTIC: CPT | Performed by: NURSE PRACTITIONER

## 2022-02-01 RX ORDER — SUCRALFATE 1 G/1
1 TABLET ORAL AS NEEDED
Status: ON HOLD | COMMUNITY
Start: 2021-12-28 | End: 2022-07-08

## 2022-02-01 RX ORDER — CETIRIZINE HYDROCHLORIDE 10 MG/1
10 TABLET ORAL DAILY
Qty: 30 TABLET | Refills: 2 | Status: SHIPPED | OUTPATIENT
Start: 2022-02-01

## 2022-02-01 RX ORDER — PSEUDOEPHEDRINE HCL 30 MG
30 TABLET ORAL EVERY 4 HOURS PRN
Qty: 60 TABLET | Refills: 2 | Status: SHIPPED | OUTPATIENT
Start: 2022-02-01

## 2022-02-01 RX ORDER — FAMOTIDINE 20 MG/1
20 TABLET, FILM COATED ORAL 2 TIMES DAILY
COMMUNITY

## 2022-02-01 RX ORDER — OMEPRAZOLE 20 MG/1
20 CAPSULE, DELAYED RELEASE ORAL DAILY
Qty: 30 CAPSULE | Refills: 2 | Status: SHIPPED | OUTPATIENT
Start: 2022-02-01 | End: 2023-03-21

## 2022-02-01 NOTE — PROGRESS NOTES
"GERD    Donald Navarro is a 76 y.o. male.     76-year-old white male with history of CAD/MI/stents, hypertension, hyperlipidemia, arthritis, spinal stenosis  and DDD lumbar and BPH who comes in today for annual visit    Blood pressure 142/84 heart rate 86 he denies any chest pain, dyspnea, tachycardia or dizziness    Patient's main complaint is sore throat x10 days that comes and goes with no other symptoms.  Throat has postnasal drip but no exudate blisters or redness.  His strep was negative.  I placed him on allergy medication told him to let me know throat condition if not improved    Patient does have GERD and has been acting up lately which may be contributing to his sore throat.  I ordered him some Prilosec to try for couple weeks to see if throat pain improves.  He has an upcoming appointment gastroenterology for his hiatal hernia    Patient weight is down 3 pounds at 245 a BMI 32.6.  He is up-to-date on 3 Covid vaccines but does need to schedule an eye exam.  I am doing his PSA today and he had a colonoscopy in 2019 which is due this year in May              Fasting blood work  Zyrtec 10/Flonase/Sudafed 30 4 times daily  Diet and exercise monitor weight  Keep all appointments with cardiology  Follow-up 6 months to 1 year                         The following portions of the patient's history were reviewed and updated as appropriate: allergies, current medications, past family history, past medical history, past social history, past surgical history and problem list.    Vitals:    02/01/22 1306   BP: 143/84   BP Location: Left arm   Patient Position: Sitting   Cuff Size: Large Adult   Pulse: 87   Temp: 97.3 °F (36.3 °C)   TempSrc: Temporal   SpO2: 94%   Weight: 109 kg (240 lb 6.4 oz)   Height: 182.9 cm (72\")     Body mass index is 32.6 kg/m².    Past Medical History:   Diagnosis Date   • CHF (congestive heart failure) (HCC)    • Coronary artery disease    • GERD (gastroesophageal reflux disease)    • " Hyperlipidemia    • Hypertension    • Myocardial infarction (HCC)      Past Surgical History:   Procedure Laterality Date   • CARDIAC CATHETERIZATION  01/2007    TIA: proximal LAD   • COLONOSCOPY     • HERNIA REPAIR     • TOTAL HIP ARTHROPLASTY Left      Family History   Problem Relation Age of Onset   • Heart disease Father      Immunization History   Administered Date(s) Administered   • Fluad Quad 65+ 10/18/2019, 09/17/2020   • Fluzone High Dose =>65 Years ([employer] ONLY) 10/09/2015, 10/17/2016, 10/11/2017, 09/24/2018   • Pneumococcal Conjugate 13-Valent (PCV13) 10/26/2016   • Pneumococcal Polysaccharide (PPSV23) 11/25/2019       Orders Only on 02/01/2021   Component Date Value Ref Range Status   • Glucose 02/01/2021 97  65 - 99 mg/dL Final   • BUN 02/01/2021 15  8 - 27 mg/dL Final   • Creatinine 02/01/2021 1.09  0.76 - 1.27 mg/dL Final   • eGFR Non  Am 02/01/2021 67  >59 mL/min/1.73 Final   • eGFR African Am 02/01/2021 77  >59 mL/min/1.73 Final   • BUN/Creatinine Ratio 02/01/2021 14  10 - 24 Final   • Sodium 02/01/2021 142  134 - 144 mmol/L Final   • Potassium 02/01/2021 4.5  3.5 - 5.2 mmol/L Final   • Chloride 02/01/2021 108* 96 - 106 mmol/L Final   • Total CO2 02/01/2021 23  20 - 29 mmol/L Final   • Calcium 02/01/2021 8.9  8.6 - 10.2 mg/dL Final   • Total Protein 02/01/2021 6.3  6.0 - 8.5 g/dL Final   • Albumin 02/01/2021 4.0  3.7 - 4.7 g/dL Final   • Globulin 02/01/2021 2.3  1.5 - 4.5 g/dL Final   • A/G Ratio 02/01/2021 1.7  1.2 - 2.2 Final   • Total Bilirubin 02/01/2021 0.6  0.0 - 1.2 mg/dL Final   • Alkaline Phosphatase 02/01/2021 78  39 - 117 IU/L Final   • AST (SGOT) 02/01/2021 20  0 - 40 IU/L Final   • ALT (SGPT) 02/01/2021 15  0 - 44 IU/L Final   • Total Cholesterol 02/01/2021 147  100 - 199 mg/dL Final   • Triglycerides 02/01/2021 215* 0 - 149 mg/dL Final   • HDL Cholesterol 02/01/2021 29* >39 mg/dL Final   • VLDL Cholesterol [place] 02/01/2021 36  5 - 40 mg/dL Final   • LDL Chol Calc ([facility])  02/01/2021 82  0 - 99 mg/dL Final   • Creatine Kinase 02/01/2021 72  41 - 331 U/L Final   • Theo Toro CMP14 Default 02/01/2021 Comment   Final    Comment: A hand-written panel/profile was received from your office. In  accordance with the [facility] Ambiguous Test Code Policy dated July 2003, we have completed your order by using the closest currently  or formerly recognized AMA panel.  We have assigned Comprehensive  Metabolic Panel (14), Test Code #565248 to this request.  If this  is not the testing you wished to receive on this specimen, please  contact the [facility] Client Inquiry/Technical Services Department  to clarify the test order.  We appreciate your business.     • Theo Toro LP Default 02/01/2021 Comment   Final    Comment: A hand-written panel/profile was received from your office. In  accordance with the [facility] Ambiguous Test Code Policy dated July 2003, we have completed your order by using the closest currently  or formerly recognized AMA panel.  We have assigned Lipid Panel,  Test Code #559064 to this request. If this is not the testing you  wished to receive on this specimen, please contact the [facility]  Client Inquiry/Technical Services Department to clarify the test  order.  We appreciate your business.           Review of Systems   Constitutional: Negative.    HENT: Positive for postnasal drip and sore throat.    Respiratory: Positive for cough.    Cardiovascular: Negative.    Gastrointestinal: Positive for GERD.   Genitourinary: Negative.    Musculoskeletal: Negative.    Skin: Negative.    Neurological: Negative.    Psychiatric/Behavioral: Negative.        Objective   Physical Exam  Constitutional:       Appearance: Normal appearance.   HENT:      Head: Normocephalic and atraumatic.      Mouth/Throat:      Comments: Postnasal drip  Cardiovascular:      Rate and Rhythm: Normal rate and regular rhythm.      Pulses: Normal pulses.      Heart sounds: Normal heart sounds.   Pulmonary:       Effort: Pulmonary effort is normal.      Breath sounds: Normal breath sounds.   Abdominal:      General: Bowel sounds are normal.   Musculoskeletal:         General: Normal range of motion.   Skin:     General: Skin is warm.   Neurological:      General: No focal deficit present.      Mental Status: He is alert and oriented to person, place, and time.   Psychiatric:         Mood and Affect: Mood normal.         Behavior: Behavior normal.         Procedures    Assessment/Plan   Diagnoses and all orders for this visit:    1. Mixed hyperlipidemia (Primary)  -     Lipid Panel With LDL / HDL Ratio; Future    2. Screening PSA (prostate specific antigen)  -     PSA Screen; Future    3. Preventative health care  -     CBC & Differential; Future  -     Comprehensive Metabolic Panel; Future    4. Sore throat  -     POCT rapid strep A    5. Seasonal allergies    6. Gastroesophageal reflux disease with hiatal hernia    7. Obesity (BMI 30-39.9)    Other orders  -     cetirizine (ZyrTEC Allergy) 10 MG tablet; Take 1 tablet by mouth Daily.  Dispense: 30 tablet; Refill: 2  -     pseudoephedrine (Sudafed) 30 MG tablet; Take 1 tablet by mouth Every 4 (Four) Hours As Needed for Congestion.  Dispense: 60 tablet; Refill: 2  -     omeprazole (PrilOSEC) 20 MG capsule; Take 1 capsule by mouth Daily.  Dispense: 30 capsule; Refill: 2          Current Outpatient Medications:   •  aspirin 81 MG tablet, Take 162 mg by mouth Daily., Disp: , Rfl:   •  clopidogrel (PLAVIX) 75 MG tablet, Take 1 tablet by mouth once daily, Disp: 90 tablet, Rfl: 3  •  diphenhydrAMINE-acetaminophen (TYLENOL PM EXTRA STRENGTH)  MG tablet per tablet, Take 1 tablet by mouth Every Night., Disp: , Rfl:   •  famotidine (PEPCID) 20 MG tablet, Take 20 mg by mouth 2 (Two) Times a Day., Disp: , Rfl:   •  fluticasone (FLONASE) 50 MCG/ACT nasal spray, 2 sprays into the nostril(s) as directed by provider Daily As Needed for Rhinitis., Disp: , Rfl:   •  isosorbide mononitrate  (IMDUR) 30 MG 24 hr tablet, Take 1 tablet by mouth Daily., Disp: 90 tablet, Rfl: 3  •  lisinopril (PRINIVIL,ZESTRIL) 5 MG tablet, Take 1 tablet by mouth once daily, Disp: 90 tablet, Rfl: 3  •  metoprolol tartrate (LOPRESSOR) 25 MG tablet, Take 1/2 (one-half) tablet by mouth once daily, Disp: 45 tablet, Rfl: 3  •  rosuvastatin (CRESTOR) 40 MG tablet, TAKE 1 TABLET BY MOUTH ONCE DAILY AT NIGHT, Disp: 90 tablet, Rfl: 3  •  sucralfate (CARAFATE) 1 g tablet, Take 1 g by mouth As Needed., Disp: , Rfl:   •  cetirizine (ZyrTEC Allergy) 10 MG tablet, Take 1 tablet by mouth Daily., Disp: 30 tablet, Rfl: 2  •  omeprazole (PrilOSEC) 20 MG capsule, Take 1 capsule by mouth Daily., Disp: 30 capsule, Rfl: 2  •  pseudoephedrine (Sudafed) 30 MG tablet, Take 1 tablet by mouth Every 4 (Four) Hours As Needed for Congestion., Disp: 60 tablet, Rfl: 2           Rama Quintanilla, APRN2/1/202214:40 EST  This note has been electronically signed

## 2022-02-01 NOTE — PATIENT INSTRUCTIONS
Fasting blood work  Zyrtec 10/Flonase/Sudafed 30 4 times daily  Diet and exercise monitor weight  Keep all appointments with cardiology  Follow-up 6 months to 1 year

## 2022-03-01 ENCOUNTER — OFFICE (OUTPATIENT)
Dept: URBAN - METROPOLITAN AREA CLINIC 64 | Facility: CLINIC | Age: 77
End: 2022-03-01

## 2022-03-01 VITALS
DIASTOLIC BLOOD PRESSURE: 89 MMHG | WEIGHT: 242 LBS | HEIGHT: 72 IN | SYSTOLIC BLOOD PRESSURE: 146 MMHG | HEART RATE: 62 BPM

## 2022-03-01 DIAGNOSIS — K21.9 GASTRO-ESOPHAGEAL REFLUX DISEASE WITHOUT ESOPHAGITIS: ICD-10-CM

## 2022-03-01 PROCEDURE — 99213 OFFICE O/P EST LOW 20 MIN: CPT | Performed by: NURSE PRACTITIONER

## 2022-03-01 RX ORDER — OMEPRAZOLE 40 MG/1
40 CAPSULE, DELAYED RELEASE ORAL
Qty: 30 | Refills: 11 | Status: COMPLETED
Start: 2022-03-01 | End: 2023-02-09

## 2022-04-11 ENCOUNTER — OFFICE (OUTPATIENT)
Dept: URBAN - METROPOLITAN AREA CLINIC 64 | Facility: CLINIC | Age: 77
End: 2022-04-11

## 2022-04-11 VITALS
WEIGHT: 242 LBS | DIASTOLIC BLOOD PRESSURE: 78 MMHG | HEIGHT: 72 IN | HEART RATE: 67 BPM | SYSTOLIC BLOOD PRESSURE: 142 MMHG

## 2022-04-11 DIAGNOSIS — K21.9 GASTRO-ESOPHAGEAL REFLUX DISEASE WITHOUT ESOPHAGITIS: ICD-10-CM

## 2022-04-11 PROCEDURE — 99213 OFFICE O/P EST LOW 20 MIN: CPT | Performed by: NURSE PRACTITIONER

## 2022-04-18 RX ORDER — ROSUVASTATIN CALCIUM 40 MG/1
40 TABLET, COATED ORAL NIGHTLY
Qty: 90 TABLET | Refills: 3 | Status: SHIPPED | OUTPATIENT
Start: 2022-04-18

## 2022-04-18 NOTE — TELEPHONE ENCOUNTER
Rx Refill Note  Requested Prescriptions      No prescriptions requested or ordered in this encounter      Last office visit with prescribing clinician: 11/3/2021      Next office visit with prescribing clinician: f/u in [place]  04/18/22, 10:13 EDT

## 2022-05-06 RX ORDER — CLOPIDOGREL BISULFATE 75 MG/1
75 TABLET ORAL DAILY
Qty: 90 TABLET | Refills: 3 | Status: SHIPPED | OUTPATIENT
Start: 2022-05-06

## 2022-05-06 NOTE — TELEPHONE ENCOUNTER
Rx Refill Note  Requested Prescriptions     Pending Prescriptions Disp Refills   • clopidogrel (PLAVIX) 75 MG tablet 90 tablet 3     Sig: Take 1 tablet by mouth Daily.      Last office visit with prescribing clinician: 11/3/2021      Next office visit with prescribing clinician: 5/25/2022     SCANNED - LABS (08/02/2021)         Marlena Nelson MA  05/06/22, 14:15 EDT

## 2022-05-25 ENCOUNTER — OFFICE VISIT (OUTPATIENT)
Dept: CARDIOLOGY | Facility: CLINIC | Age: 77
End: 2022-05-25

## 2022-05-25 VITALS
DIASTOLIC BLOOD PRESSURE: 76 MMHG | HEIGHT: 72 IN | HEART RATE: 60 BPM | WEIGHT: 237 LBS | SYSTOLIC BLOOD PRESSURE: 122 MMHG | BODY MASS INDEX: 32.1 KG/M2 | OXYGEN SATURATION: 98 %

## 2022-05-25 DIAGNOSIS — I25.10 CORONARY ARTERY DISEASE INVOLVING NATIVE CORONARY ARTERY OF NATIVE HEART WITHOUT ANGINA PECTORIS: Primary | Chronic | ICD-10-CM

## 2022-05-25 DIAGNOSIS — E78.2 MIXED HYPERLIPIDEMIA: Chronic | ICD-10-CM

## 2022-05-25 DIAGNOSIS — I10 PRIMARY HYPERTENSION: Chronic | ICD-10-CM

## 2022-05-25 PROCEDURE — 99214 OFFICE O/P EST MOD 30 MIN: CPT | Performed by: INTERNAL MEDICINE

## 2022-05-25 RX ORDER — ASPIRIN 81 MG/1
81 TABLET ORAL 2 TIMES DAILY
COMMUNITY

## 2022-05-25 RX ORDER — ISOSORBIDE DINITRATE 30 MG/1
TABLET ORAL
Status: ON HOLD | COMMUNITY
End: 2022-07-08

## 2022-05-25 RX ORDER — OMEPRAZOLE 40 MG/1
CAPSULE, DELAYED RELEASE ORAL
COMMUNITY
Start: 2022-04-29 | End: 2023-03-21

## 2022-05-25 NOTE — PROGRESS NOTES
Progress note      Name: Donald Navarro ADMIT: (Not on file)   : 1945  PCP: Rama Quintanilla APRN    MRN: [id number] LOS: 0 days   AGE/SEX: 76 y.o. male  ROOM: Room/bed info not found     Chief Complaint   Patient presents with   • Coronary Artery Disease   • Hypertension       Subjective       History of present illness  Donald Navarro is a 76-year-old male patient with history of coronary artery disease status post previous PCI to the LAD, hypertension, dyslipidemia, here today for follow-up.  Patient is doing well denies having any chest pain or shortness of breath, no palpitations no lower extremity edema no syncopal episodes.  Overall is feeling well.  No recent hospitalizations.    Past Medical History:   Diagnosis Date   • CHF (congestive heart failure) (HCC)    • Coronary artery disease    • GERD (gastroesophageal reflux disease)    • Hyperlipidemia    • Hypertension    • Myocardial infarction (HCC)      Past Surgical History:   Procedure Laterality Date   • CARDIAC CATHETERIZATION  2007    TIA: proximal LAD   • COLONOSCOPY     • HERNIA REPAIR     • TOTAL HIP ARTHROPLASTY Left      Family History   Problem Relation Age of Onset   • Heart disease Father      Social History     Tobacco Use   • Smoking status: Never Smoker   • Smokeless tobacco: Never Used   Vaping Use   • Vaping Use: Never used   Substance Use Topics   • Alcohol use: No   • Drug use: No     (Not in a hospital admission)    Allergies:  Adhesive tape      Physical Exam  VITALS REVIEWED    General:      well developed, in no acute distress.    Head:      normocephalic and atraumatic.    Eyes:      PERRL/EOM intact, conjunctiva and sclera clear with out nystagmus.    Neck:      no masses, thyromegaly,  trachea central with normal respiratory effort and PMI displaced laterally  Lungs:      Clear to auscultation bilaterally  Heart:       Regular rate and rhythm  Msk:      no deformity or scoliosis noted of thoracic or lumbar spine.     Pulses:      pulses normal in all 4 extremities.    Extremities:       No lower extremity edema  Neurologic:      no focal deficits.   alert oriented x3  Skin:      intact without lesions or rashes.    Psych:      alert and cooperative; normal mood and affect; normal attention span and concentration.      Result Review :               Pertinent cardiac workup    1. EKG 11/3/2021 sinus rhythm normal EKG.  2. Stress test on 8/12/2021 mild defect in the inferior apical region, ejection 64%  3. Echocardiogram 8/12/2021 ejection fraction 60%, mild aortic regurgitation aortic root 4.1 cm.  4. Heart catheterization January 2018 40% in-stent restenosis LAD also 30% RCA and circumflex.        Procedures        Assessment and Plan      Donald Navarro is a 76-year-old male patient with history of coronary artery disease status post PCI as detailed above, patient had a mildly low stress test in August 2021 but he is on good therapy for coronary artery disease and angina and he is not experiencing any significant chest pain and no CHF symptoms. At this time I will continue therapy. His cholesterol profile is good with LDL of 70 on 5/20/2022.  We will see him in follow-up in 8 months or sooner if he has any issues.    Diagnoses and all orders for this visit:    1. Coronary artery disease involving native coronary artery of native heart without angina pectoris (Primary)    2. Mixed hyperlipidemia    3. Primary hypertension           No follow-ups on file.  Patient was given instructions and counseling regarding his condition or for health maintenance advice. Please see specific information pulled into the AVS if appropriate.

## 2022-07-05 ENCOUNTER — TELEPHONE (OUTPATIENT)
Dept: CARDIOLOGY | Facility: CLINIC | Age: 77
End: 2022-07-05

## 2022-07-05 NOTE — TELEPHONE ENCOUNTER
Pt went to the ER in [place] for chest pain and low heart rate. Has appt with Dr. Zamora in [place] 7/6. HR dropping in 40's when sleeping. Then his heart rate goes up and was told by the ER in [place] to only take 1/2 metoprolol if HR goes up over 90's. Please advise.

## 2022-07-05 NOTE — TELEPHONE ENCOUNTER
[facility]  Dr. Neymar Watson  Colonoscopy  Schedule TBD  Phone# [phone]  Fax# [phone]            Placed on Dr. Zamora

## 2022-07-06 ENCOUNTER — PREP FOR SURGERY (OUTPATIENT)
Dept: OTHER | Facility: HOSPITAL | Age: 77
End: 2022-07-06

## 2022-07-06 ENCOUNTER — OFFICE VISIT (OUTPATIENT)
Dept: CARDIOLOGY | Facility: CLINIC | Age: 77
End: 2022-07-06

## 2022-07-06 VITALS
WEIGHT: 240 LBS | SYSTOLIC BLOOD PRESSURE: 134 MMHG | HEART RATE: 70 BPM | OXYGEN SATURATION: 95 % | DIASTOLIC BLOOD PRESSURE: 83 MMHG | BODY MASS INDEX: 32.51 KG/M2 | HEIGHT: 72 IN

## 2022-07-06 DIAGNOSIS — I20.0 UNSTABLE ANGINA PECTORIS: Primary | ICD-10-CM

## 2022-07-06 DIAGNOSIS — E78.2 MIXED HYPERLIPIDEMIA: Chronic | ICD-10-CM

## 2022-07-06 DIAGNOSIS — I25.110 CORONARY ARTERY DISEASE INVOLVING NATIVE CORONARY ARTERY OF NATIVE HEART WITH UNSTABLE ANGINA PECTORIS: Primary | Chronic | ICD-10-CM

## 2022-07-06 DIAGNOSIS — I10 PRIMARY HYPERTENSION: Chronic | ICD-10-CM

## 2022-07-06 PROCEDURE — 99214 OFFICE O/P EST MOD 30 MIN: CPT | Performed by: INTERNAL MEDICINE

## 2022-07-06 NOTE — PROGRESS NOTES
Progress note      Name: Donald Navarro ADMIT: (Not on file)   : 1945  PCP: Rama Quintanilla APRN    MRN: [id number] LOS: 0 days   AGE/SEX: 77 y.o. male  ROOM: Room/bed info not found     Chief Complaint   Patient presents with   • Hypertension     Hospital follow up       Subjective       History of present illness  Donald Navarro is a 77-year-old male patient with history of coronary artery disease status post previous PCI to the LAD, hypertension, dyslipidemia, here today for follow-up.  On July 3, 2022, the patient presented to the ER in [place] with complaints of chest pain, the pain was pressure-like felt as heaviness on his left side with radiation down to his left arm and shoulder associated with some shortness of breath.  His symptoms are similar to where he had his stents put in although less severe.  Patient is already on metoprolol as well as Imdur.  He ruled out for myocardial infarction and was discharged home.    Past Medical History:   Diagnosis Date   • CHF (congestive heart failure) (HCC)    • Coronary artery disease    • GERD (gastroesophageal reflux disease)    • Hyperlipidemia    • Hypertension    • Myocardial infarction (HCC)      Past Surgical History:   Procedure Laterality Date   • CARDIAC CATHETERIZATION  2007    TIA: proximal LAD   • COLONOSCOPY     • HERNIA REPAIR     • TOTAL HIP ARTHROPLASTY Left      Family History   Problem Relation Age of Onset   • Heart disease Father      Social History     Tobacco Use   • Smoking status: Never Smoker   • Smokeless tobacco: Never Used   Vaping Use   • Vaping Use: Never used   Substance Use Topics   • Alcohol use: No   • Drug use: No     (Not in a hospital admission)    Allergies:  Adhesive tape      Physical Exam  VITALS REVIEWED    General:      well developed, in no acute distress.    Head:      normocephalic and atraumatic.    Eyes:      PERRL/EOM intact, conjunctiva and sclera clear with out nystagmus.    Neck:      no masses,  thyromegaly,  trachea central with normal respiratory effort and PMI displaced laterally  Lungs:      Clear to auscultation bilaterally  Heart:       regular rate and rhythm  Msk:      no deformity or scoliosis noted of thoracic or lumbar spine.    Pulses:      pulses normal in all 4 extremities.    Extremities:       no lower extremity edema  Neurologic:      no focal deficits.   alert oriented x3  Skin:      intact without lesions or rashes.    Psych:      alert and cooperative; normal mood and affect; normal attention span and concentration.      Result Review :               Pertinent cardiac workup    1. EKG 11/3/2021 sinus rhythm normal EKG.  2. Stress test on 8/12/2021 mild defect in the inferior apical region, ejection 64%  3. Echocardiogram 8/12/2021 ejection fraction 60%, mild aortic regurgitation aortic root 4.1 cm.  4. Heart catheterization January 2018 40% in-stent restenosis LAD also 30% RCA and circumflex.      Procedures        Assessment and Plan      Donald Navarro is a 77-year-old male patient who has history of coronary artery disease as detailed above, the patient is experiencing chest pain with features suggestive of typical angina.  He is already on metoprolol as well as Imdur, his risk factors are well controlled, he remains on Crestor and his blood pressure is also well controlled.  Since the patient is already on antianginal therapy and is having breakthrough chest pain, and he has had a relatively recent stress test which was of low risk, we should probably go ahead and schedule him for a heart catheterization.  Risks and benefits were discussed with the patient who is agreeable.  I have contacted Dr. Parr who is agreeable to perform the heart catheterization.    Diagnoses and all orders for this visit:    1. Coronary artery disease involving native coronary artery of native heart with unstable angina pectoris (HCC) (Primary)    2. Mixed hyperlipidemia    3. Primary hypertension            No follow-ups on file.  Patient was given instructions and counseling regarding his condition or for health maintenance advice. Please see specific information pulled into the AVS if appropriate.

## 2022-07-07 ENCOUNTER — TELEPHONE (OUTPATIENT)
Dept: CARDIOLOGY | Facility: CLINIC | Age: 77
End: 2022-07-07

## 2022-07-07 DIAGNOSIS — I25.119 CHEST PAIN DUE TO CORONARY ARTERY DISEASE: Primary | ICD-10-CM

## 2022-07-07 RX ORDER — RANOLAZINE 500 MG/1
500 TABLET, EXTENDED RELEASE ORAL 2 TIMES DAILY
Qty: 60 TABLET | Refills: 3 | Status: SHIPPED | OUTPATIENT
Start: 2022-07-07 | End: 2022-07-20

## 2022-07-07 NOTE — TELEPHONE ENCOUNTER
Peer to Peer was requested on patient regarding cardiac catheterization.  Discussed with insurance physician at length and cardiac cath was still denied even given patient's history of CAD.  Patient is on low dose beta blocker (Has  Bradycardia) and isosorbide.     Discussed with primary cardiologist- Dr. Zamora.  We will go ahead with stress test to guide need to cardiac cath even though patient had stress test 8/2021 and start patient on ranexa.     Electronically signed by CHEMO Yost, 07/07/22, 3:54 PM EDT.

## 2022-07-07 NOTE — TELEPHONE ENCOUNTER
Per Lorri. Please call patient and let him know his cardiac cath cannot be done without having a stress test first and we called in a new prescription that is for chest pain (it will not affect his blood pressure or heart rate).  If symptoms worsen go to the ED at [facility]     Called pt and advised. He verbally understood and is aware to go to ER if gets worse.     Please get pt schedule for testing.

## 2022-07-08 ENCOUNTER — HOSPITAL ENCOUNTER (OUTPATIENT)
Facility: HOSPITAL | Age: 77
Discharge: HOME | End: 2022-07-09
Attending: EMERGENCY MEDICINE | Admitting: EMERGENCY MEDICINE

## 2022-07-08 ENCOUNTER — APPOINTMENT (OUTPATIENT)
Dept: GENERAL RADIOLOGY | Facility: HOSPITAL | Age: 77
End: 2022-07-08

## 2022-07-08 DIAGNOSIS — I20.0 UNSTABLE ANGINA: ICD-10-CM

## 2022-07-08 DIAGNOSIS — R07.9 CHEST PAIN, UNSPECIFIED TYPE: Primary | ICD-10-CM

## 2022-07-08 LAB
ALBUMIN SERPL-MCNC: 4.1 G/DL (ref 3.5–5.2)
ALBUMIN/GLOB SERPL: 1.6 G/DL
ALP SERPL-CCNC: 83 U/L (ref 39–117)
ALT SERPL W P-5'-P-CCNC: 15 U/L (ref 1–41)
ANION GAP SERPL CALCULATED.3IONS-SCNC: 13 MMOL/L (ref 5–15)
APTT PPP: 30.8 SECONDS (ref 61–76.5)
AST SERPL-CCNC: 23 U/L (ref 1–40)
BASOPHILS # BLD AUTO: 0.1 10*3/MM3 (ref 0–0.2)
BASOPHILS NFR BLD AUTO: 1 % (ref 0–1.5)
BILIRUB SERPL-MCNC: 0.6 MG/DL (ref 0–1.2)
BUN SERPL-MCNC: 10 MG/DL (ref 8–23)
BUN/CREAT SERPL: 11.8 (ref 7–25)
CALCIUM SPEC-SCNC: 8.7 MG/DL (ref 8.6–10.5)
CHLORIDE SERPL-SCNC: 104 MMOL/L (ref 98–107)
CK SERPL-CCNC: 93 U/L (ref 20–200)
CO2 SERPL-SCNC: 22 MMOL/L (ref 22–29)
CREAT SERPL-MCNC: 0.85 MG/DL (ref 0.76–1.27)
DEPRECATED RDW RBC AUTO: 43.8 FL (ref 37–54)
EGFRCR SERPLBLD CKD-EPI 2021: 90.6 ML/MIN/1.73
EOSINOPHIL # BLD AUTO: 0.3 10*3/MM3 (ref 0–0.4)
EOSINOPHIL NFR BLD AUTO: 3.9 % (ref 0.3–6.2)
ERYTHROCYTE [DISTWIDTH] IN BLOOD BY AUTOMATED COUNT: 14.2 % (ref 12.3–15.4)
GLOBULIN UR ELPH-MCNC: 2.5 GM/DL
GLUCOSE SERPL-MCNC: 110 MG/DL (ref 65–99)
HCT VFR BLD AUTO: 46 % (ref 37.5–51)
HGB BLD-MCNC: 15.1 G/DL (ref 13–17.7)
HOLD SPECIMEN: NORMAL
HOLD SPECIMEN: NORMAL
INR PPP: 1.12 (ref 0.93–1.1)
LYMPHOCYTES # BLD AUTO: 1.3 10*3/MM3 (ref 0.7–3.1)
LYMPHOCYTES NFR BLD AUTO: 19.2 % (ref 19.6–45.3)
MCH RBC QN AUTO: 29.4 PG (ref 26.6–33)
MCHC RBC AUTO-ENTMCNC: 32.9 G/DL (ref 31.5–35.7)
MCV RBC AUTO: 89.3 FL (ref 79–97)
MONOCYTES # BLD AUTO: 0.8 10*3/MM3 (ref 0.1–0.9)
MONOCYTES NFR BLD AUTO: 11 % (ref 5–12)
NEUTROPHILS NFR BLD AUTO: 4.5 10*3/MM3 (ref 1.7–7)
NEUTROPHILS NFR BLD AUTO: 64.9 % (ref 42.7–76)
NRBC BLD AUTO-RTO: 0 /100 WBC (ref 0–0.2)
NT-PROBNP SERPL-MCNC: 139.4 PG/ML (ref 0–1800)
PLATELET # BLD AUTO: 201 10*3/MM3 (ref 140–450)
PMV BLD AUTO: 9.1 FL (ref 6–12)
POTASSIUM SERPL-SCNC: 4.3 MMOL/L (ref 3.5–5.2)
PROT SERPL-MCNC: 6.6 G/DL (ref 6–8.5)
PROTHROMBIN TIME: 11.5 SECONDS (ref 9.6–11.7)
QT INTERVAL: 471 MS
RBC # BLD AUTO: 5.15 10*6/MM3 (ref 4.14–5.8)
SARS-COV-2 RNA PNL SPEC NAA+PROBE: NOT DETECTED
SODIUM SERPL-SCNC: 139 MMOL/L (ref 136–145)
TROPONIN T SERPL-MCNC: <0.01 NG/ML (ref 0–0.03)
TROPONIN T SERPL-MCNC: <0.01 NG/ML (ref 0–0.03)
WBC NRBC COR # BLD: 6.9 10*3/MM3 (ref 3.4–10.8)
WHOLE BLOOD HOLD COAG: NORMAL
WHOLE BLOOD HOLD SPECIMEN: NORMAL

## 2022-07-08 PROCEDURE — 82550 ASSAY OF CK (CPK): CPT | Performed by: NURSE PRACTITIONER

## 2022-07-08 PROCEDURE — G0378 HOSPITAL OBSERVATION PER HR: HCPCS

## 2022-07-08 PROCEDURE — 85025 COMPLETE CBC W/AUTO DIFF WBC: CPT | Performed by: NURSE PRACTITIONER

## 2022-07-08 PROCEDURE — 99214 OFFICE O/P EST MOD 30 MIN: CPT | Performed by: INTERNAL MEDICINE

## 2022-07-08 PROCEDURE — 83880 ASSAY OF NATRIURETIC PEPTIDE: CPT | Performed by: NURSE PRACTITIONER

## 2022-07-08 PROCEDURE — C9803 HOPD COVID-19 SPEC COLLECT: HCPCS

## 2022-07-08 PROCEDURE — 93005 ELECTROCARDIOGRAM TRACING: CPT | Performed by: EMERGENCY MEDICINE

## 2022-07-08 PROCEDURE — 71045 X-RAY EXAM CHEST 1 VIEW: CPT

## 2022-07-08 PROCEDURE — 85730 THROMBOPLASTIN TIME PARTIAL: CPT | Performed by: NURSE PRACTITIONER

## 2022-07-08 PROCEDURE — 87635 SARS-COV-2 COVID-19 AMP PRB: CPT | Performed by: EMERGENCY MEDICINE

## 2022-07-08 PROCEDURE — 85610 PROTHROMBIN TIME: CPT | Performed by: NURSE PRACTITIONER

## 2022-07-08 PROCEDURE — 80053 COMPREHEN METABOLIC PANEL: CPT | Performed by: NURSE PRACTITIONER

## 2022-07-08 PROCEDURE — 99284 EMERGENCY DEPT VISIT MOD MDM: CPT

## 2022-07-08 PROCEDURE — 84484 ASSAY OF TROPONIN QUANT: CPT | Performed by: NURSE PRACTITIONER

## 2022-07-08 PROCEDURE — 36415 COLL VENOUS BLD VENIPUNCTURE: CPT | Performed by: NURSE PRACTITIONER

## 2022-07-08 PROCEDURE — 84484 ASSAY OF TROPONIN QUANT: CPT | Performed by: EMERGENCY MEDICINE

## 2022-07-08 RX ORDER — ROSUVASTATIN CALCIUM 10 MG/1
40 TABLET, COATED ORAL NIGHTLY
Status: DISCONTINUED | OUTPATIENT
Start: 2022-07-08 | End: 2022-07-09 | Stop reason: HOSPADM

## 2022-07-08 RX ORDER — SODIUM CHLORIDE 0.9 % (FLUSH) 0.9 %
10 SYRINGE (ML) INJECTION AS NEEDED
Status: DISCONTINUED | OUTPATIENT
Start: 2022-07-08 | End: 2022-07-09 | Stop reason: HOSPADM

## 2022-07-08 RX ORDER — FAMOTIDINE 20 MG/1
20 TABLET, FILM COATED ORAL
Status: DISCONTINUED | OUTPATIENT
Start: 2022-07-08 | End: 2022-07-09 | Stop reason: HOSPADM

## 2022-07-08 RX ORDER — PANTOPRAZOLE SODIUM 40 MG/1
40 TABLET, DELAYED RELEASE ORAL EVERY MORNING
Status: DISCONTINUED | OUTPATIENT
Start: 2022-07-09 | End: 2022-07-08

## 2022-07-08 RX ORDER — CETIRIZINE HYDROCHLORIDE 10 MG/1
10 TABLET ORAL DAILY
Status: DISCONTINUED | OUTPATIENT
Start: 2022-07-08 | End: 2022-07-08

## 2022-07-08 RX ORDER — ISOSORBIDE MONONITRATE 30 MG/1
30 TABLET, EXTENDED RELEASE ORAL DAILY
Status: DISCONTINUED | OUTPATIENT
Start: 2022-07-08 | End: 2022-07-09 | Stop reason: HOSPADM

## 2022-07-08 RX ORDER — BISACODYL 5 MG/1
5 TABLET, DELAYED RELEASE ORAL DAILY PRN
Status: DISCONTINUED | OUTPATIENT
Start: 2022-07-08 | End: 2022-07-09 | Stop reason: HOSPADM

## 2022-07-08 RX ORDER — SODIUM CHLORIDE 0.9 % (FLUSH) 0.9 %
10 SYRINGE (ML) INJECTION EVERY 12 HOURS SCHEDULED
Status: DISCONTINUED | OUTPATIENT
Start: 2022-07-08 | End: 2022-07-09 | Stop reason: HOSPADM

## 2022-07-08 RX ORDER — ONDANSETRON 4 MG/1
4 TABLET, FILM COATED ORAL EVERY 6 HOURS PRN
Status: DISCONTINUED | OUTPATIENT
Start: 2022-07-08 | End: 2022-07-09 | Stop reason: HOSPADM

## 2022-07-08 RX ORDER — LISINOPRIL 5 MG/1
5 TABLET ORAL DAILY
Status: DISCONTINUED | OUTPATIENT
Start: 2022-07-08 | End: 2022-07-09

## 2022-07-08 RX ORDER — ACETAMINOPHEN 325 MG/1
650 TABLET ORAL EVERY 4 HOURS PRN
Status: DISCONTINUED | OUTPATIENT
Start: 2022-07-08 | End: 2022-07-09 | Stop reason: HOSPADM

## 2022-07-08 RX ORDER — ASPIRIN 81 MG/1
81 TABLET ORAL DAILY
Status: DISCONTINUED | OUTPATIENT
Start: 2022-07-08 | End: 2022-07-09 | Stop reason: HOSPADM

## 2022-07-08 RX ORDER — ONDANSETRON 2 MG/ML
4 INJECTION INTRAMUSCULAR; INTRAVENOUS EVERY 6 HOURS PRN
Status: DISCONTINUED | OUTPATIENT
Start: 2022-07-08 | End: 2022-07-09 | Stop reason: HOSPADM

## 2022-07-08 RX ORDER — CLOPIDOGREL BISULFATE 75 MG/1
75 TABLET ORAL DAILY
Status: DISCONTINUED | OUTPATIENT
Start: 2022-07-08 | End: 2022-07-09 | Stop reason: HOSPADM

## 2022-07-08 RX ORDER — BISACODYL 10 MG
10 SUPPOSITORY, RECTAL RECTAL DAILY PRN
Status: DISCONTINUED | OUTPATIENT
Start: 2022-07-08 | End: 2022-07-09 | Stop reason: HOSPADM

## 2022-07-08 RX ORDER — RANOLAZINE 500 MG/1
500 TABLET, EXTENDED RELEASE ORAL 2 TIMES DAILY
Status: DISCONTINUED | OUTPATIENT
Start: 2022-07-08 | End: 2022-07-09 | Stop reason: HOSPADM

## 2022-07-08 RX ORDER — POLYETHYLENE GLYCOL 3350 17 G/17G
17 POWDER, FOR SOLUTION ORAL DAILY PRN
Status: DISCONTINUED | OUTPATIENT
Start: 2022-07-08 | End: 2022-07-09 | Stop reason: HOSPADM

## 2022-07-08 RX ADMIN — Medication 10 ML: at 21:43

## 2022-07-08 RX ADMIN — RANOLAZINE 500 MG: 500 TABLET, FILM COATED, EXTENDED RELEASE ORAL at 21:43

## 2022-07-08 RX ADMIN — NITROGLYCERIN 1 INCH: 20 OINTMENT TOPICAL at 11:57

## 2022-07-08 RX ADMIN — FAMOTIDINE 20 MG: 20 TABLET ORAL at 18:11

## 2022-07-08 RX ADMIN — ROSUVASTATIN 40 MG: 10 TABLET, FILM COATED ORAL at 21:42

## 2022-07-08 NOTE — ED PROVIDER NOTES
Subjective   Patient is a 77-year-old white male with history of hypertension, high cholesterol, CAD with prior stents and MI.  He presents today with complaints of chest pain.  States it started around 5 AM this morning and was severe.  He states it is slightly subsided since arrival to the ED.  States has been having some intermittent pain for the last week or so.  He states he saw his cardiologist earlier this week and was scheduled to have a heart cath next week however he states his insurance has declined it until he proceeds with stress test first.  He denies any associated shortness of breath, nausea or sweats.  He describes a pressure and burning pain in his left chest that goes into his left arm without alleviating or exacerbating factors.  He denies any fever chills cough congestion leg pain swelling or other complaint.          Review of Systems   Constitutional: Negative.    HENT: Negative.    Eyes: Negative.    Respiratory: Negative.    Cardiovascular: Positive for chest pain.   Gastrointestinal: Negative.    Genitourinary: Negative.    Musculoskeletal: Negative.    Skin: Negative.    Neurological: Negative.        Past Medical History:   Diagnosis Date   • CHF (congestive heart failure) (HCC)    • Coronary artery disease    • GERD (gastroesophageal reflux disease)    • Hyperlipidemia    • Hypertension    • Myocardial infarction (HCC)        Allergies   Allergen Reactions   • Adhesive Tape Itching       Past Surgical History:   Procedure Laterality Date   • CARDIAC CATHETERIZATION  01/2007    TIA: proximal LAD   • COLONOSCOPY     • HERNIA REPAIR     • TOTAL HIP ARTHROPLASTY Left        Family History   Problem Relation Age of Onset   • Heart disease Father        Social History     Socioeconomic History   • Marital status:    Tobacco Use   • Smoking status: Never Smoker   • Smokeless tobacco: Never Used   Vaping Use   • Vaping Use: Never used   Substance and Sexual Activity   • Alcohol use: No    • Drug use: No   • Sexual activity: Defer           Objective   Physical Exam  Vital signs and triage nurse note reviewed.  Constitutional: Awake, alert; well-developed and well-nourished. No acute distress is noted.  HEENT: Normocephalic, atraumatic; pupils are PERRL with intact EOM; oropharynx is pink and moist without exudate or erythema.  No drooling or pooling of oral secretions.  Neck: Supple, full range of motion without pain; no cervical lymphadenopathy. Normal phonation.  Cardiovascular: Regular rate and rhythm, normal S1-S2.  No murmur noted.  Pulmonary: Respiratory effort regular nonlabored, breath sounds clear to auscultation all fields.  Abdomen: Soft, nontender, nondistended with normoactive bowel sounds; no rebound or guarding.  Musculoskeletal: Independent range of motion of all extremities with no palpable tenderness or edema.  Neuro: Alert oriented x3, speech is clear and appropriate, GCS 15.    Skin: Flesh tone, warm, dry, intact; no erythematous or petechial rash or lesion.      Procedures           ED Course            Labs Reviewed   COMPREHENSIVE METABOLIC PANEL - Abnormal; Notable for the following components:       Result Value    Glucose 110 (*)     All other components within normal limits    Narrative:     GFR Normal >60  Chronic Kidney Disease <60  Kidney Failure <15     PROTIME-INR - Abnormal; Notable for the following components:    INR 1.12 (*)     All other components within normal limits   APTT - Abnormal; Notable for the following components:    PTT 30.8 (*)     All other components within normal limits   CBC WITH AUTO DIFFERENTIAL - Abnormal; Notable for the following components:    Lymphocyte % 19.2 (*)     All other components within normal limits   TROPONIN (IN-HOUSE) - Normal    Narrative:     Troponin T Reference Range:  <= 0.03 ng/mL-   Negative for AMI  >0.03 ng/mL-     Abnormal for myocardial necrosis.  Clinicians would have to utilize clinical acumen, EKG, Troponin and  serial changes to determine if it is an Acute Myocardial Infarction or myocardial injury due to an underlying chronic condition.       Results may be falsely decreased if patient taking Biotin.     COVID PRE-OP / PRE-PROCEDURE SCREENING ORDER (NO ISOLATION)    Narrative:     The following orders were created for panel order COVID PRE-OP / PRE-PROCEDURE SCREENING ORDER (NO ISOLATION) - Swab, Nasopharynx.  Procedure                               Abnormality         Status                     ---------                               -----------         ------                     COVID-19,CEPHEID/NIR,CO...[[id number]]                      In process                   Please view results for these tests on the individual orders.   COVID-19,CEPHEID/NIR,COR/FIONA/PAD/MONE IN-HOUSE,NP SWAB IN TRANSPORT MEDIA 3-4 HR TAT, RT-PCR   RAINBOW DRAW    Narrative:     The following orders were created for panel order [facility] Draw.  Procedure                               Abnormality         Status                     ---------                               -----------         ------                     Green Top (Gel)[[id number]]                                  Final result               Lavender Top[[id number]]                                     Final result               Gold Top - SST[[id number]]                                   Final result               Light Blue Top[[id number]]                                   Final result                 Please view results for these tests on the individual orders.   TROPONIN (IN-HOUSE)   GREEN TOP   LAVENDER TOP   GOLD TOP - SST   LIGHT BLUE TOP   CBC AND DIFFERENTIAL    Narrative:     The following orders were created for panel order CBC & Differential.  Procedure                               Abnormality         Status                     ---------                               -----------         ------                     CBC Auto Differential[[id number]]        Abnormal            Final  result                 Please view results for these tests on the individual orders.     XR Chest 1 View    Result Date: 7/8/2022   1. An acute pulmonary process is not apparent.  Electronically Signed By-Perez Butterfield MD On:7/8/2022 11:56 AM This report was finalized on [id number] by  Perez Butterfield MD.    Medications   sodium chloride 0.9 % flush 10 mL (has no administration in time range)   nitroglycerin (NITROSTAT) ointment 1 inch (1 inch Topical Given 7/8/22 1157)                                       MDM  Number of Diagnoses or Management Options  Chest pain, unspecified type  Diagnosis management comments: Comorbidities: Hypertension, high cholesterol, CAD with stent  Differentials: Cardiac ischemia, ACS;this list is not all inclusive and does not constitute the entirety of considered causes  Discussion with provider:  Radiology interpretation: X-rays reviewed by me and interpreted by radiologist: As above  Lab interpretation: Labs viewed by me significant for: As above    Patient was placed on continuous cardiac monitor.  He had an IV established.  He had labs, EKG chest x-ray obtained.  He states he had aspirin prior to arrival.  He had Nitropaste applied.    Work-up: EKG reviewed by me and interpreted by Dr. Trejo shows sinus bradycardia with ventricular rate of 53, no acute ST or T wave changes.  CBC and metabolic panel were unremarkable.  First troponin negative.  Chest x-ray shows no acute abnormality.    Patient will be placed in the observation unit for further evaluation.  He was discussed with the nurse practitioner in the observation unit.    Diagnosis and treatment plan discussed with patient.  Patient agreeable to plan.            Amount and/or Complexity of Data Reviewed  Clinical lab tests: ordered and reviewed  Tests in the radiology section of CPT®: ordered and reviewed    Patient Progress  Patient progress: stable      Final diagnoses:   Chest pain, unspecified type       ED  Disposition  ED Disposition     ED Disposition   Decision to Admit    Condition   --    Comment   --             No follow-up provider specified.       Medication List      No changes were made to your prescriptions during this visit.          Jazlyn Moore, CHEMO  07/08/22 3995

## 2022-07-08 NOTE — CONSULTS
HP      Name: Donald Navarro ADMIT: 2022   : 1945  PCP: Rama Quintanilla APRN    MRN: [id number] LOS: 0 days   AGE/SEX: 77 y.o. male  ROOM: 106/1     Chief Complaint   Patient presents with   • Chest Pain       Subjective        History of present illness  Donald Navaror is a 77-year-old male patient who has history of coronary artery disease status post previous PCI to the LAD, hypertension, dyslipidemia, is admitted to the ER for complaints of chest discomfort.  I had seen this patient a few days back in [place], at that time he was complaining of chest pain as well.  And we were planning on performing an outpatient heart catheterization.  Patient has been experiencing chest pain for some time now, felt as pressure in his chest with some radiation to the shoulder and down to the left arm.  He was already on Imdur and metoprolol as part of antianginal therapy.  Upon presentation to the ER Nitropatch was placed and his symptoms seem to have gotten better.    Past Medical History:   Diagnosis Date   • CHF (congestive heart failure) (HCC)    • Coronary artery disease    • GERD (gastroesophageal reflux disease)    • Hyperlipidemia    • Hypertension    • Myocardial infarction (HCC)      Past Surgical History:   Procedure Laterality Date   • CARDIAC CATHETERIZATION  2007    TIA: proximal LAD   • COLONOSCOPY     • HERNIA REPAIR     • TOTAL HIP ARTHROPLASTY Left      Family History   Problem Relation Age of Onset   • Heart disease Father      Social History     Tobacco Use   • Smoking status: Never Smoker   • Smokeless tobacco: Never Used   Vaping Use   • Vaping Use: Never used   Substance Use Topics   • Alcohol use: No   • Drug use: No     Medications Prior to Admission   Medication Sig Dispense Refill Last Dose   • aspirin 81 MG EC tablet 81 mg 2 (Two) Times a Day.   2022 at Unknown time   • cetirizine (ZyrTEC Allergy) 10 MG tablet Take 1 tablet by mouth Daily. 30 tablet 2 2022 at Unknown time   •  clopidogrel (PLAVIX) 75 MG tablet Take 1 tablet by mouth Daily. 90 tablet 3 7/8/2022 at Unknown time   • diphenhydrAMINE-acetaminophen (TYLENOL PM)  MG tablet per tablet Take 2 tablets by mouth Every Night.   7/7/2022 at Unknown time   • famotidine (PEPCID) 20 MG tablet Take 20 mg by mouth 2 (Two) Times a Day.   7/7/2022 at Unknown time   • fluticasone (FLONASE) 50 MCG/ACT nasal spray 2 sprays into the nostril(s) as directed by provider Daily As Needed for Rhinitis.   7/8/2022 at Unknown time   • isosorbide mononitrate (IMDUR) 30 MG 24 hr tablet Take 1 tablet by mouth Daily. 90 tablet 3 7/8/2022 at Unknown time   • lisinopril (PRINIVIL,ZESTRIL) 5 MG tablet Take 1 tablet by mouth once daily 90 tablet 3 7/8/2022 at Unknown time   • metoprolol tartrate (LOPRESSOR) 25 MG tablet Take 1/2 (one-half) tablet by mouth once daily 45 tablet 3 7/8/2022 at Unknown time   • rosuvastatin (CRESTOR) 40 MG tablet Take 1 tablet by mouth Every Night. 90 tablet 3 7/7/2022 at Unknown time   • omeprazole (PrilOSEC) 20 MG capsule Take 1 capsule by mouth Daily. 30 capsule 2    • omeprazole (priLOSEC) 40 MG capsule TAKE 1 CAPSULE BY MOUTH IN THE MORNING FOR 30 DAYS      • pseudoephedrine (Sudafed) 30 MG tablet Take 1 tablet by mouth Every 4 (Four) Hours As Needed for Congestion. 60 tablet 2 More than a month at Unknown time   • ranolazine (Ranexa) 500 MG 12 hr tablet Take 1 tablet by mouth 2 (Two) Times a Day. 60 tablet 3 Unknown at Unknown time     Allergies:  Adhesive tape    Review of systems    Constitutional: Negative.    Respiratory and cardiovascular: As detailed in HPI section.  Gastrointestinal: Negative for constipation, nausea and vomiting negative for abdominal distention, abdominal pain and diarrhea.   Genitourinary: Negative for difficulty urinating and flank pain.   Musculoskeletal: Negative for arthralgias, joint swelling and myalgias.   Skin: Negative for color change, rash and wound.   Neurological: Negative for  dizziness, syncope, weakness and headaches.   Hematological: Negative for adenopathy.   Psychiatric/Behavioral: Negative for confusion.   All other systems reviewed and are negative.       Physical Exam  VITALS REVIEWED    General:      well developed, in no acute distress.    Head:      normocephalic and atraumatic.    Eyes:      PERRL/EOM intact, conjunctiva and sclera clear with out nystagmus.    Neck:      no masses, thyromegaly,  trachea central with normal respiratory effort and PMI displaced laterally  Lungs:      Clear to auscultation bilaterally  Heart:       regular rate and rhythm  Msk:      no deformity or scoliosis noted of thoracic or lumbar spine.    Pulses:      pulses normal in all 4 extremities.    Extremities:       no lower extremity edema  Neurologic:      no focal deficits.   alert oriented x3  Skin:      intact without lesions or rashes.    Psych:      alert and cooperative; normal mood and affect; normal attention span and concentration.      Result Review :               Pertinent cardiac workup    1. EKG 11/3/2021 sinus rhythm normal EKG.  2. Stress test on 8/12/2021 mild defect in the inferior apical region, ejection 64%  3. Echocardiogram 8/12/2021 ejection fraction 60%, mild aortic regurgitation aortic root 4.1 cm.  4. Heart catheterization January 2018 40% in-stent restenosis LAD also 30% RCA and circumflex.  5. EKG 7/8/2022 sinus rhythm      Assessment and Plan         Chest pain      Donald Navarro is a 77-year-old male patient who has history of CAD status post PCI to the LAD, presents to the ER with complaints of chest discomfort.  His chest pain has typical features, he ruled out for myocardial infarction with negative troponin and negative EKG findings.  Patient already has a stress test within a year which was unrevealing.  He does however have in-stent restenosis of 40% based on heart catheterization in 2018.  His chest pain has been going on for the last month or so, gradually  getting worse.  I believe that the patient will be best served with a heart catheterization to reassess his coronary perfusion with intent for revascularization if needed.        No follow-ups on file.  Patient was given instructions and counseling regarding his condition or for health maintenance advice. Please see specific information pulled into the AVS if appropriate.

## 2022-07-08 NOTE — PLAN OF CARE
Goal Outcome Evaluation:  Plan of Care Reviewed With: patient, spouse           Outcome Evaluation: pt comes from the ER with c/o chest pain just arrived to unit

## 2022-07-08 NOTE — CASE MANAGEMENT/SOCIAL WORK
"Discharge Planning Assessment  [facility]     Patient Name: Donald Navarro  MRN: [id number]  Today's Date: 7/8/2022    Admit Date: 7/8/2022     Discharge Needs Assessment     Row Name 07/08/22 1234       Living Environment    People in Home alone    Unique Family Situation Pt reports he and significant other, Deonna Thompson \"have been together 16 years, but we both keep our own homes\"    Current Living Arrangements home    Primary Care Provided by self    Provides Primary Care For no one    Family Caregiver if Needed significant other    Family Caregiver Names Deonna Thompson    Quality of Family Relationships supportive    Able to Return to Prior Arrangements yes       Resource/Environmental Concerns    Resource/Environmental Concerns none    Transportation Concerns none       Transition Planning    Patient/Family Anticipates Transition to home    Patient/Family Anticipated Services at Transition none    Transportation Anticipated family or friend will provide  Pt reports Deonna or his son will pick him up at dc       Discharge Needs Assessment    Equipment Currently Used at Home none    Concerns to be Addressed denies needs/concerns at this time    Anticipated Changes Related to Illness none    Equipment Needed After Discharge none    Current Discharge Risk lives alone               Discharge Plan     Row Name 07/08/22 1236       Plan    Plan Home    Patient/Family in Agreement with Plan yes    Plan Comments Pt reports self to be independent with ADLs, and denies use of any DME.Confirms PCP and pharmacy and denies problems affording medications. Pt anticipates returning home and dc and currently denies dc needs              Continued Care and Services - Admitted Since 7/8/2022    Coordination has not been started for this encounter.       Expected Discharge Date and Time     Expected Discharge Date Expected Discharge Time    Jul 9, 2022          Demographic Summary     Row Name 07/08/22 1233       General Information "    Admission Type observation    Arrived From home    Required Notices Provided Observation Status Notice    Referral Source admission list    Reason for Consult discharge planning    Preferred Language English       Contact Information    Permission Granted to Share Info With                Functional Status     Row Name 07/08/22 1233       Functional Status    Usual Activity Tolerance good    Current Activity Tolerance good       Functional Status, IADL    Medications independent    Meal Preparation independent    Housekeeping independent    Laundry independent    Shopping independent                   Patient Forms     Row Name 07/08/22 1236       Patient Forms    Patient Observation Letter Delivered  LOBO signed/dated 7/8/22 1207 noted in EMR              Ching Eric RN, [facility]  Office: [phone]  Fax: [phone]  [email or web address]      I met with patient in room wearing PPE: mask and goggles.     Maintained distance greater than six feet and spent </=15 minutes in the room      Ching Eric RN

## 2022-07-09 ENCOUNTER — READMISSION MANAGEMENT (OUTPATIENT)
Dept: CALL CENTER | Facility: HOSPITAL | Age: 77
End: 2022-07-09

## 2022-07-09 VITALS
SYSTOLIC BLOOD PRESSURE: 106 MMHG | HEIGHT: 72 IN | WEIGHT: 231.04 LBS | OXYGEN SATURATION: 98 % | TEMPERATURE: 97.9 F | HEART RATE: 60 BPM | DIASTOLIC BLOOD PRESSURE: 73 MMHG | BODY MASS INDEX: 31.29 KG/M2 | RESPIRATION RATE: 16 BRPM

## 2022-07-09 LAB
ALBUMIN SERPL-MCNC: 3.7 G/DL (ref 3.5–5.2)
ALBUMIN/GLOB SERPL: 1.5 G/DL
ALP SERPL-CCNC: 77 U/L (ref 39–117)
ALT SERPL W P-5'-P-CCNC: 14 U/L (ref 1–41)
ANION GAP SERPL CALCULATED.3IONS-SCNC: 10 MMOL/L (ref 5–15)
ANION GAP SERPL CALCULATED.3IONS-SCNC: 10 MMOL/L (ref 5–15)
AST SERPL-CCNC: 22 U/L (ref 1–40)
BASOPHILS # BLD AUTO: 0.1 10*3/MM3 (ref 0–0.2)
BASOPHILS NFR BLD AUTO: 1 % (ref 0–1.5)
BILIRUB SERPL-MCNC: 0.6 MG/DL (ref 0–1.2)
BUN SERPL-MCNC: 13 MG/DL (ref 8–23)
BUN SERPL-MCNC: 14 MG/DL (ref 8–23)
BUN/CREAT SERPL: 14.1 (ref 7–25)
BUN/CREAT SERPL: 14.6 (ref 7–25)
CALCIUM SPEC-SCNC: 8.3 MG/DL (ref 8.6–10.5)
CALCIUM SPEC-SCNC: 8.4 MG/DL (ref 8.6–10.5)
CHLORIDE SERPL-SCNC: 102 MMOL/L (ref 98–107)
CHLORIDE SERPL-SCNC: 105 MMOL/L (ref 98–107)
CO2 SERPL-SCNC: 22 MMOL/L (ref 22–29)
CO2 SERPL-SCNC: 23 MMOL/L (ref 22–29)
CREAT SERPL-MCNC: 0.92 MG/DL (ref 0.76–1.27)
CREAT SERPL-MCNC: 0.96 MG/DL (ref 0.76–1.27)
DEPRECATED RDW RBC AUTO: 44.6 FL (ref 37–54)
DEPRECATED RDW RBC AUTO: 45.9 FL (ref 37–54)
EGFRCR SERPLBLD CKD-EPI 2021: 82.4 ML/MIN/1.73
EGFRCR SERPLBLD CKD-EPI 2021: 86.7 ML/MIN/1.73
EOSINOPHIL # BLD AUTO: 0.3 10*3/MM3 (ref 0–0.4)
EOSINOPHIL NFR BLD AUTO: 4.8 % (ref 0.3–6.2)
ERYTHROCYTE [DISTWIDTH] IN BLOOD BY AUTOMATED COUNT: 14.3 % (ref 12.3–15.4)
ERYTHROCYTE [DISTWIDTH] IN BLOOD BY AUTOMATED COUNT: 14.3 % (ref 12.3–15.4)
GLOBULIN UR ELPH-MCNC: 2.5 GM/DL
GLUCOSE SERPL-MCNC: 165 MG/DL (ref 65–99)
GLUCOSE SERPL-MCNC: 99 MG/DL (ref 65–99)
HCT VFR BLD AUTO: 45.2 % (ref 37.5–51)
HCT VFR BLD AUTO: 45.3 % (ref 37.5–51)
HGB BLD-MCNC: 14.6 G/DL (ref 13–17.7)
HGB BLD-MCNC: 14.7 G/DL (ref 13–17.7)
INR PPP: 1.14 (ref 0.93–1.1)
LYMPHOCYTES # BLD AUTO: 1.3 10*3/MM3 (ref 0.7–3.1)
LYMPHOCYTES NFR BLD AUTO: 19.4 % (ref 19.6–45.3)
MCH RBC QN AUTO: 29.1 PG (ref 26.6–33)
MCH RBC QN AUTO: 29.5 PG (ref 26.6–33)
MCHC RBC AUTO-ENTMCNC: 32.3 G/DL (ref 31.5–35.7)
MCHC RBC AUTO-ENTMCNC: 32.6 G/DL (ref 31.5–35.7)
MCV RBC AUTO: 89.3 FL (ref 79–97)
MCV RBC AUTO: 91.1 FL (ref 79–97)
MONOCYTES # BLD AUTO: 0.9 10*3/MM3 (ref 0.1–0.9)
MONOCYTES NFR BLD AUTO: 12.7 % (ref 5–12)
NEUTROPHILS NFR BLD AUTO: 4.2 10*3/MM3 (ref 1.7–7)
NEUTROPHILS NFR BLD AUTO: 62.1 % (ref 42.7–76)
NRBC BLD AUTO-RTO: 0.1 /100 WBC (ref 0–0.2)
PLATELET # BLD AUTO: 165 10*3/MM3 (ref 140–450)
PLATELET # BLD AUTO: 169 10*3/MM3 (ref 140–450)
PMV BLD AUTO: 8.9 FL (ref 6–12)
PMV BLD AUTO: 9.3 FL (ref 6–12)
POTASSIUM SERPL-SCNC: 4.2 MMOL/L (ref 3.5–5.2)
POTASSIUM SERPL-SCNC: 4.6 MMOL/L (ref 3.5–5.2)
PROT SERPL-MCNC: 6.2 G/DL (ref 6–8.5)
PROTHROMBIN TIME: 11.7 SECONDS (ref 9.6–11.7)
RBC # BLD AUTO: 4.97 10*6/MM3 (ref 4.14–5.8)
RBC # BLD AUTO: 5.06 10*6/MM3 (ref 4.14–5.8)
SODIUM SERPL-SCNC: 134 MMOL/L (ref 136–145)
SODIUM SERPL-SCNC: 138 MMOL/L (ref 136–145)
TROPONIN T SERPL-MCNC: <0.01 NG/ML (ref 0–0.03)
WBC NRBC COR # BLD: 5.9 10*3/MM3 (ref 3.4–10.8)
WBC NRBC COR # BLD: 6.8 10*3/MM3 (ref 3.4–10.8)

## 2022-07-09 PROCEDURE — 85025 COMPLETE CBC W/AUTO DIFF WBC: CPT | Performed by: NURSE PRACTITIONER

## 2022-07-09 PROCEDURE — 93005 ELECTROCARDIOGRAM TRACING: CPT | Performed by: EMERGENCY MEDICINE

## 2022-07-09 PROCEDURE — C1894 INTRO/SHEATH, NON-LASER: HCPCS | Performed by: INTERNAL MEDICINE

## 2022-07-09 PROCEDURE — 0 IOPAMIDOL PER 1 ML: Performed by: INTERNAL MEDICINE

## 2022-07-09 PROCEDURE — C1760 CLOSURE DEV, VASC: HCPCS

## 2022-07-09 PROCEDURE — 25010000002 MIDAZOLAM PER 1 MG: Performed by: INTERNAL MEDICINE

## 2022-07-09 PROCEDURE — 99152 MOD SED SAME PHYS/QHP 5/>YRS: CPT | Performed by: INTERNAL MEDICINE

## 2022-07-09 PROCEDURE — G0378 HOSPITAL OBSERVATION PER HR: HCPCS

## 2022-07-09 PROCEDURE — 85610 PROTHROMBIN TIME: CPT | Performed by: INTERNAL MEDICINE

## 2022-07-09 PROCEDURE — 25010000002 PHENYLEPHRINE 10 MG/ML SOLUTION: Performed by: INTERNAL MEDICINE

## 2022-07-09 PROCEDURE — 93010 ELECTROCARDIOGRAM REPORT: CPT | Performed by: INTERNAL MEDICINE

## 2022-07-09 PROCEDURE — 84484 ASSAY OF TROPONIN QUANT: CPT | Performed by: EMERGENCY MEDICINE

## 2022-07-09 PROCEDURE — C1769 GUIDE WIRE: HCPCS | Performed by: INTERNAL MEDICINE

## 2022-07-09 PROCEDURE — 93458 L HRT ARTERY/VENTRICLE ANGIO: CPT | Performed by: INTERNAL MEDICINE

## 2022-07-09 PROCEDURE — 25010000002 FENTANYL CITRATE (PF) 100 MCG/2ML SOLUTION: Performed by: INTERNAL MEDICINE

## 2022-07-09 PROCEDURE — 80053 COMPREHEN METABOLIC PANEL: CPT | Performed by: EMERGENCY MEDICINE

## 2022-07-09 PROCEDURE — 85027 COMPLETE CBC AUTOMATED: CPT | Performed by: INTERNAL MEDICINE

## 2022-07-09 PROCEDURE — 99214 OFFICE O/P EST MOD 30 MIN: CPT | Performed by: INTERNAL MEDICINE

## 2022-07-09 RX ORDER — LIDOCAINE HYDROCHLORIDE 20 MG/ML
INJECTION, SOLUTION INFILTRATION; PERINEURAL AS NEEDED
Status: DISCONTINUED | OUTPATIENT
Start: 2022-07-09 | End: 2022-07-09 | Stop reason: HOSPADM

## 2022-07-09 RX ORDER — PHENYLEPHRINE HYDROCHLORIDE 10 MG/ML
INJECTION INTRAVENOUS AS NEEDED
Status: DISCONTINUED | OUTPATIENT
Start: 2022-07-09 | End: 2022-07-09 | Stop reason: HOSPADM

## 2022-07-09 RX ORDER — FENTANYL CITRATE 50 UG/ML
INJECTION, SOLUTION INTRAMUSCULAR; INTRAVENOUS AS NEEDED
Status: DISCONTINUED | OUTPATIENT
Start: 2022-07-09 | End: 2022-07-09 | Stop reason: HOSPADM

## 2022-07-09 RX ORDER — SODIUM CHLORIDE 0.9 % (FLUSH) 0.9 %
10 SYRINGE (ML) INJECTION AS NEEDED
Status: DISCONTINUED | OUTPATIENT
Start: 2022-07-09 | End: 2022-07-09

## 2022-07-09 RX ORDER — SODIUM CHLORIDE 0.9 % (FLUSH) 0.9 %
10 SYRINGE (ML) INJECTION EVERY 12 HOURS SCHEDULED
Status: DISCONTINUED | OUTPATIENT
Start: 2022-07-09 | End: 2022-07-09

## 2022-07-09 RX ORDER — SODIUM CHLORIDE 9 MG/ML
75 INJECTION, SOLUTION INTRAVENOUS CONTINUOUS
Status: DISCONTINUED | OUTPATIENT
Start: 2022-07-09 | End: 2022-07-09 | Stop reason: HOSPADM

## 2022-07-09 RX ORDER — SODIUM CHLORIDE 9 MG/ML
250 INJECTION, SOLUTION INTRAVENOUS ONCE AS NEEDED
Status: DISCONTINUED | OUTPATIENT
Start: 2022-07-09 | End: 2022-07-09 | Stop reason: HOSPADM

## 2022-07-09 RX ORDER — SODIUM CHLORIDE 9 MG/ML
INJECTION, SOLUTION INTRAVENOUS CONTINUOUS PRN
Status: COMPLETED | OUTPATIENT
Start: 2022-07-09 | End: 2022-07-09

## 2022-07-09 RX ORDER — LISINOPRIL 2.5 MG/1
2.5 TABLET ORAL DAILY
Status: DISCONTINUED | OUTPATIENT
Start: 2022-07-10 | End: 2022-07-09 | Stop reason: HOSPADM

## 2022-07-09 RX ORDER — MIDAZOLAM HYDROCHLORIDE 1 MG/ML
INJECTION INTRAMUSCULAR; INTRAVENOUS AS NEEDED
Status: DISCONTINUED | OUTPATIENT
Start: 2022-07-09 | End: 2022-07-09 | Stop reason: HOSPADM

## 2022-07-09 RX ADMIN — ISOSORBIDE MONONITRATE 30 MG: 30 TABLET, EXTENDED RELEASE ORAL at 08:28

## 2022-07-09 RX ADMIN — SODIUM CHLORIDE 75 ML/HR: 0.9 INJECTION, SOLUTION INTRAVENOUS at 13:44

## 2022-07-09 RX ADMIN — LISINOPRIL 5 MG: 5 TABLET ORAL at 08:28

## 2022-07-09 RX ADMIN — CLOPIDOGREL BISULFATE 75 MG: 75 TABLET ORAL at 06:02

## 2022-07-09 RX ADMIN — ACETAMINOPHEN 650 MG: 325 TABLET, FILM COATED ORAL at 02:48

## 2022-07-09 RX ADMIN — METOPROLOL TARTRATE 12.5 MG: 25 TABLET, FILM COATED ORAL at 08:28

## 2022-07-09 RX ADMIN — FAMOTIDINE 20 MG: 20 TABLET ORAL at 08:28

## 2022-07-09 RX ADMIN — RANOLAZINE 500 MG: 500 TABLET, FILM COATED, EXTENDED RELEASE ORAL at 08:28

## 2022-07-09 RX ADMIN — ASPIRIN 81 MG: 81 TABLET, COATED ORAL at 06:02

## 2022-07-09 NOTE — PLAN OF CARE
Problem: Adult Inpatient Plan of Care  Goal: Plan of Care Review  Outcome: Met  Goal: Patient-Specific Goal (Individualized)  Outcome: Met  Goal: Absence of Hospital-Acquired Illness or Injury  Outcome: Met  Intervention: Identify and Manage Fall Risk  Description: Perform standard risk assessment on admission using a validated tool or comprehensive approach appropriate to the patient; reassess fall risk frequently, with change in status or transfer to another level of care.  Communicate fall injury risk to interprofessional healthcare team.  Determine need for increased observation, equipment and environmental modification, such as low bed, signage and supportive, nonskid footwear.  Adjust safety measures to individual developmental age, stage and identified risk factors.  Reinforce the importance of safety and physical activity with patient and family.  Perform regular intentional rounding to assess need for position change, pain assessment and personal needs, including assistance with toileting.  Recent Flowsheet Documentation  Taken 7/9/2022 1406 by Ida Ramirez RN  Safety Promotion/Fall Prevention:   assistive device/personal items within reach   clutter free environment maintained   safety round/check completed   room organization consistent  Taken 7/9/2022 1400 by Ida Ramirez RN  Safety Promotion/Fall Prevention:   assistive device/personal items within reach   clutter free environment maintained   safety round/check completed   room organization consistent   nonskid shoes/slippers when out of bed  Taken 7/9/2022 1250 by Ida Ramirez RN  Safety Promotion/Fall Prevention: (cardiac cath)   patient off unit   other (see comments)  Taken 7/9/2022 1200 by Ida Ramirez RN  Safety Promotion/Fall Prevention:   assistive device/personal items within reach   clutter free environment maintained   safety round/check completed   room organization consistent   nonskid shoes/slippers when out of bed  Taken  7/9/2022 1122 by Ida Ramirez RN  Safety Promotion/Fall Prevention:   assistive device/personal items within reach   clutter free environment maintained   safety round/check completed   room organization consistent  Intervention: Prevent Skin Injury  Description: Perform a screening for skin injury risk, such as pressure or moisture associated skin damage on admission and at regular intervals throughout hospital stay.  Keep all areas of skin (especially folds) clean and dry.  Maintain adequate skin hydration.  Relieve and redistribute pressure and protect bony prominences; implement measures based on patient-specific risk factors.  Match turning and repositioning schedule to clinical condition.  Encourage weight shift frequently; assist with reposition if unable to complete independently.  Float heels off bed; avoid pressure on the Achilles tendon.  Keep skin free from extended contact with medical devices.  Encourage functional activity and mobility, as early as tolerated.  Use aids (e.g., slide boards, mechanical lift) during transfer.  Recent Flowsheet Documentation  Taken 7/9/2022 1406 by Ida Ramirez RN  Body Position: 30 degrees  Taken 7/9/2022 1400 by Ida Ramirez RN  Body Position: 30 degrees  Taken 7/9/2022 1200 by Ida Ramirez RN  Body Position:   sitting up in bed   dangle, side of bed   position changed independently  Taken 7/9/2022 1122 by Ida Ramirez RN  Body Position:   supine   position changed independently  Intervention: Prevent and Manage VTE (Venous Thromboembolism) Risk  Description: Assess for VTE (venous thromboembolism) risk.  Encourage and assist with early ambulation.  Initiate and maintain compression or other therapy, as indicated, based on identified risk in accordance with organizational protocol and provider order.  Encourage both active and passive leg exercises while in bed, if unable to ambulate.  Recent Flowsheet Documentation  Taken 7/9/2022 1406 by Ida Ramirez  RN  Activity Management: bedrest  Taken 7/9/2022 1400 by Ida Ramirez RN  Activity Management: bedrest  Taken 7/9/2022 1200 by Ida Ramirez RN  Activity Management:   activity adjusted per tolerance   activity encouraged   up ad antonina  Taken 7/9/2022 1122 by Ida Ramirez RN  Activity Management:   up ad antonina   activity encouraged  Intervention: Prevent Infection  Description: Maintain skin and mucous membrane integrity; promote hand, oral and pulmonary hygiene.  Optimize fluid balance, nutrition, sleep and glycemic control to maximize infection resistance.  Identify potential sources of infection early to prevent or mitigate progression of infection (e.g., wound, lines, devices).  Evaluate ongoing need for invasive devices; remove promptly when no longer indicated.  Recent Flowsheet Documentation  Taken 7/9/2022 1406 by Ida Ramirez RN  Infection Prevention:   single patient room provided   rest/sleep promoted  Taken 7/9/2022 1400 by Ida Ramirez RN  Infection Prevention:   single patient room provided   rest/sleep promoted  Taken 7/9/2022 1200 by Ida Ramirez RN  Infection Prevention:   single patient room provided   rest/sleep promoted  Taken 7/9/2022 1122 by Ida Ramirez RN  Infection Prevention:   single patient room provided   rest/sleep promoted  Goal: Optimal Comfort and Wellbeing  Outcome: Met  Intervention: Monitor Pain and Promote Comfort  Description: Assess pain level, treatment efficacy and patient response at regular intervals using a consistent pain scale.  Consider the presence and impact of preexisting chronic pain.  Encourage patient and caregiver involvement in pain assessment, interventions and safety measures.  Recent Flowsheet Documentation  Taken 7/9/2022 1406 by Ida Ramirez RN  Pain Management Interventions: quiet environment facilitated  Taken 7/9/2022 1200 by Ida Ramirez RN  Pain Management Interventions: quiet environment facilitated  Intervention: Provide Person-Centered  Care  Description: Use a family-focused approach to care.  Develop trust and rapport by proactively providing information, encouraging questions, addressing concerns and offering reassurance.  Acknowledge emotional response to hospitalization.  Recognize and utilize personal coping strategies.  Honor spiritual and cultural preferences.  Recent Flowsheet Documentation  Taken 7/9/2022 1406 by Ida Ramirez RN  Trust Relationship/Rapport:   care explained   choices provided   emotional support provided   empathic listening provided   questions answered   questions encouraged   reassurance provided   thoughts/feelings acknowledged  Taken 7/9/2022 1200 by Ida Ramirez RN  Trust Relationship/Rapport:   care explained   choices provided   emotional support provided   empathic listening provided   questions answered   questions encouraged   reassurance provided   thoughts/feelings acknowledged  Goal: Readiness for Transition of Care  Outcome: Met   Goal Outcome Evaluation:            Patient discharging home.

## 2022-07-09 NOTE — PROGRESS NOTES
LOS: 0 days   Admiting Physician- Song Trejo MD    Reason For Followup:    Chest pain  CAD  History of coronary artery stenting    Subjective     No active chest pain    Objective     Hemodynamics are stable    Review of Systems:   Review of Systems   Constitutional: Negative for chills and fever.   HENT: Negative for ear discharge and nosebleeds.    Eyes: Negative for discharge and redness.   Cardiovascular: Negative for chest pain, orthopnea, palpitations, paroxysmal nocturnal dyspnea and syncope.   Respiratory: Negative for cough, shortness of breath and wheezing.    Endocrine: Negative for heat intolerance.   Skin: Negative for rash.   Musculoskeletal: Negative for arthritis and myalgias.   Gastrointestinal: Negative for abdominal pain, melena, nausea and vomiting.   Genitourinary: Negative for dysuria and hematuria.   Neurological: Negative for dizziness, light-headedness, numbness and tremors.   Psychiatric/Behavioral: Negative for depression. The patient is not nervous/anxious.          Vital Signs  Vitals:    07/09/22 0209 07/09/22 0415 07/09/22 0520 07/09/22 1055   BP: 144/84 129/78 148/87 108/65   BP Location: Left arm Right arm Left arm Right arm   Patient Position: Lying Lying Lying Lying   Pulse: 58 58 62 58   Resp:  17 16 18   Temp:  97.9 °F (36.6 °C) 98.2 °F (36.8 °C) 97.8 °F (36.6 °C)   TempSrc:  Oral Oral Oral   SpO2:  97% 97% 97%   Weight:   105 kg (231 lb 0.7 oz)    Height:         Wt Readings from Last 1 Encounters:   07/09/22 105 kg (231 lb 0.7 oz)       Intake/Output Summary (Last 24 hours) at 7/9/2022 1230  Last data filed at 7/8/2022 2000  Gross per 24 hour   Intake 480 ml   Output --   Net 480 ml     Physical Exam:  Constitutional:       Appearance: Well-developed.   Eyes:      General: No scleral icterus.        Right eye: No discharge.   HENT:      Head: Normocephalic and atraumatic.   Neck:      Thyroid: No thyromegaly.      Lymphadenopathy: No cervical adenopathy.   Pulmonary:       Effort: Pulmonary effort is normal. No respiratory distress.      Breath sounds: Normal breath sounds. No wheezing. No rales.   Cardiovascular:      Normal rate. Regular rhythm.      No gallop.   Edema:     Peripheral edema absent.   Abdominal:      Tenderness: There is no abdominal tenderness.   Skin:     Findings: No erythema or rash.   Neurological:      Mental Status: Alert and oriented to person, place, and time.         Results Review:   Lab Results (last 24 hours)     Procedure Component Value Units Date/Time    Comprehensive Metabolic Panel [[id number]]  (Abnormal) Collected: 07/09/22 0733    Specimen: Blood Updated: 07/09/22 0801     Glucose 99 mg/dL      BUN 13 mg/dL      Creatinine 0.92 mg/dL      Sodium 138 mmol/L      Potassium 4.6 mmol/L      Comment: Slight hemolysis detected by analyzer. Results may be affected.        Chloride 105 mmol/L      CO2 23.0 mmol/L      Calcium 8.4 mg/dL      Total Protein 6.2 g/dL      Albumin 3.70 g/dL      ALT (SGPT) 14 U/L      AST (SGOT) 22 U/L      Comment: Slight hemolysis detected by analyzer. Results may be affected.        Alkaline Phosphatase 77 U/L      Total Bilirubin 0.6 mg/dL      Globulin 2.5 gm/dL      A/G Ratio 1.5 g/dL      BUN/Creatinine Ratio 14.1     Anion Gap 10.0 mmol/L      eGFR 86.7 mL/min/1.73      Comment: National Kidney Foundation and American Society of Nephrology (ASN) Task Force recommended calculation based on the Chronic Kidney Disease Epidemiology Collaboration (CKD-EPI) equation refit without adjustment for race.       Narrative:      GFR Normal >60  Chronic Kidney Disease <60  Kidney Failure <15      Troponin [[id number]]  (Normal) Collected: 07/09/22 0733    Specimen: Blood Updated: 07/09/22 0800     Troponin T <0.010 ng/mL     Narrative:      Troponin T Reference Range:  <= 0.03 ng/mL-   Negative for AMI  >0.03 ng/mL-     Abnormal for myocardial necrosis.  Clinicians would have to utilize clinical acumen, EKG, Troponin and serial  changes to determine if it is an Acute Myocardial Infarction or myocardial injury due to an underlying chronic condition.       Results may be falsely decreased if patient taking Biotin.      CBC Auto Differential [[id number]]  (Abnormal) Collected: 07/09/22 0510    Specimen: Blood Updated: 07/09/22 0608     WBC 6.80 10*3/mm3      RBC 4.97 10*6/mm3      Hemoglobin 14.6 g/dL      Hematocrit 45.3 %      MCV 91.1 fL      MCH 29.5 pg      MCHC 32.3 g/dL      RDW 14.3 %      RDW-SD 45.9 fl      MPV 9.3 fL      Platelets 169 10*3/mm3      Neutrophil % 62.1 %      Lymphocyte % 19.4 %      Monocyte % 12.7 %      Eosinophil % 4.8 %      Basophil % 1.0 %      Neutrophils, Absolute 4.20 10*3/mm3      Lymphocytes, Absolute 1.30 10*3/mm3      Monocytes, Absolute 0.90 10*3/mm3      Eosinophils, Absolute 0.30 10*3/mm3      Basophils, Absolute 0.10 10*3/mm3      nRBC 0.1 /100 WBC     BNP [[id number]]  (Normal) Collected: 07/08/22 1536    Specimen: Blood Updated: 07/08/22 1621     proBNP 139.4 pg/mL     Narrative:      Among patients with dyspnea, NT-proBNP is highly sensitive for the detection of acute congestive heart failure. In addition NT-proBNP of <300 pg/ml effectively rules out acute congestive heart failure with 99% negative predictive value.    Results may be falsely decreased if patient taking Biotin.      Troponin [[id number]]  (Normal) Collected: 07/08/22 1536    Specimen: Blood Updated: 07/08/22 1621     Troponin T <0.010 ng/mL     Narrative:      Troponin T Reference Range:  <= 0.03 ng/mL-   Negative for AMI  >0.03 ng/mL-     Abnormal for myocardial necrosis.  Clinicians would have to utilize clinical acumen, EKG, Troponin and serial changes to determine if it is an Acute Myocardial Infarction or myocardial injury due to an underlying chronic condition.       Results may be falsely decreased if patient taking Biotin.      CK [[id number]]  (Normal) Collected: 07/08/22 1536    Specimen: Blood Updated: 07/08/22 1614      Creatine Kinase 93 U/L     COVID PRE-OP / PRE-PROCEDURE SCREENING ORDER (NO ISOLATION) - Swab, Nasopharynx [[id number]]  (Normal) Collected: 07/08/22 1242    Specimen: Swab from Nasopharynx Updated: 07/08/22 1306    Narrative:      The following orders were created for panel order COVID PRE-OP / PRE-PROCEDURE SCREENING ORDER (NO ISOLATION) - Swab, Nasopharynx.  Procedure                               Abnormality         Status                     ---------                               -----------         ------                     COVID-19,CEPHEID/NIR,CO...[[id number]]  Normal              Final result                 Please view results for these tests on the individual orders.    COVID-19,CEPHEID/NIR,COR/FIONA/PAD/MONE IN-HOUSE(OR EMERGENT/ADD-ON),NP SWAB IN TRANSPORT MEDIA 3-4 HR TAT, RT-PCR - Swab, Nasopharynx [[id number]]  (Normal) Collected: 07/08/22 1242    Specimen: Swab from Nasopharynx Updated: 07/08/22 1306     COVID19 Not Detected    Narrative:      Fact sheet for providers: https://www.fda.gov/media/708032/download     Fact sheet for patients: https://www.fda.gov/media/950758/download  Fact sheet for providers: https://www.fda.gov/media/900975/download    Fact sheet for patients: https://www.fda.gov/media/140041/download    Test performed by PCR.    Story Draw [[id number]] Collected: 07/08/22 1120    Specimen: Blood Updated: 07/08/22 1232    Narrative:      The following orders were created for panel order Story Draw.  Procedure                               Abnormality         Status                     ---------                               -----------         ------                     Green Top (Gel)[[id number]]                                  Final result               Lavender Top[[id number]]                                     Final result               Gold Top - SST[[id number]]                                   Final result               Light Blue Top[[id number]]                                    Final result                 Please view results for these tests on the individual orders.    Light Blue Top [[id number]] Collected: 07/08/22 1120    Specimen: Blood Updated: 07/08/22 1232     Extra Tube Hold for add-ons.     Comment: Auto resulted       Green Top (Gel) [[id number]] Collected: 07/08/22 1120    Specimen: Blood Updated: 07/08/22 1232     Extra Tube Hold for add-ons.     Comment: Auto resulted.       Lavender Top [[id number]] Collected: 07/08/22 1120    Specimen: Blood Updated: 07/08/22 1232     Extra Tube hold for add-on     Comment: Auto resulted       Gold Top - SST [[id number]] Collected: 07/08/22 1120    Specimen: Blood Updated: 07/08/22 1232     Extra Tube Hold for add-ons.     Comment: Auto resulted.           Imaging Results (Last 72 Hours)     Procedure Component Value Units Date/Time    XR Chest 1 View [[id number]] Collected: 07/08/22 1156     Updated: 07/08/22 1159    Narrative:      DATE OF EXAM:  7/8/2022 11:49 AM     PROCEDURE:  XR CHEST 1 VW-     INDICATIONS:  cp; R07.9-Chest pain, unspecified     COMPARISON:  March 4, 2020     TECHNIQUE:   Single radiographic AP view of the chest was obtained.     FINDINGS:  The heart is not definitely enlarged. The lungs seem relatively clear.  There are no pleural effusions.        Impression:         1. An acute pulmonary process is not apparent.     Electronically Signed By-Perez Butterfield MD On:7/8/2022 11:56 AM  This report was finalized on [id number] by  Perez Butterfield MD.        ECG/EMG Results (most recent)     Procedure Component Value Units Date/Time    ECG 12 Lead [[id number]] Collected: 07/08/22 1556     Updated: 07/08/22 1557     QT Interval 471 ms     Narrative:      HEART RATE= 55  bpm  RR Interval= 1096  ms  MD Interval= 153  ms  P Horizontal Axis= 41  deg  P Front Axis= 70  deg  QRSD Interval= 95  ms  QT Interval= 471  ms  QRS Axis= 71  deg  T Wave Axis= 4  deg  - OTHERWISE NORMAL ECG -  Sinus bradycardia  When compared with ECG of  04-Mar-2020 10:00:36,  No significant change  Electronically Signed By:   Date and Time of Study: 2022-07-08 15:56:05    ECG 12 Lead [[id number]] Collected: 07/09/22 0253     Updated: 07/09/22 0254     QT Interval 459 ms     Narrative:      HEART RATE= 57  bpm  RR Interval= 1052  ms  [place] Interval= 147  ms  P Horizontal Axis= 36  deg  P Front Axis= -26  deg  QRSD Interval= 92  ms  QT Interval= 459  ms  QRS Axis= -5  deg  T Wave Axis= 47  deg  - OTHERWISE NORMAL ECG -  Sinus bradycardia  Electronically Signed By:   Date and Time of Study: 2022-07-09 02:53:09        CBC    Results from last 7 days   Lab Units 07/09/22  0510 07/08/22  1120   WBC 10*3/mm3 6.80 6.90   HEMOGLOBIN g/dL 14.6 15.1   PLATELETS 10*3/mm3 169 201     BMP   Results from last 7 days   Lab Units 07/09/22  0733 07/08/22  1120   SODIUM mmol/L 138 139   POTASSIUM mmol/L 4.6 4.3   CHLORIDE mmol/L 105 104   CO2 mmol/L 23.0 22.0   BUN mg/dL 13 10   CREATININE mg/dL 0.92 0.85   GLUCOSE mg/dL 99 110*     CMP   Results from last 7 days   Lab Units 07/09/22  0733 07/08/22  1120   SODIUM mmol/L 138 139   POTASSIUM mmol/L 4.6 4.3   CHLORIDE mmol/L 105 104   CO2 mmol/L 23.0 22.0   BUN mg/dL 13 10   CREATININE mg/dL 0.92 0.85   GLUCOSE mg/dL 99 110*   ALBUMIN g/dL 3.70 4.10   BILIRUBIN mg/dL 0.6 0.6   ALK PHOS U/L 77 83   AST (SGOT) U/L 22 23   ALT (SGPT) U/L 14 15     Cardiac Studies:  Echo-   Stress Myoview-  Cath-      Medication Review:   Scheduled Meds:aspirin, 81 mg, Oral, Daily  clopidogrel, 75 mg, Oral, Daily  famotidine, 20 mg, Oral, BID AC  isosorbide mononitrate, 30 mg, Oral, Daily  lisinopril, 5 mg, Oral, Daily  metoprolol tartrate, 12.5 mg, Oral, Daily  ranolazine, 500 mg, Oral, BID  rosuvastatin, 40 mg, Oral, Nightly  sodium chloride, 10 mL, Intravenous, Q12H      Continuous Infusions:   PRN Meds:.•  acetaminophen  •  polyethylene glycol **AND** bisacodyl **AND** bisacodyl  •  ondansetron **OR** ondansetron  •  [COMPLETED] Insert peripheral IV **AND**  sodium chloride  •  sodium chloride      Assessment & Plan   Patient Active Problem List   Diagnosis   • Chest pain   • Benign prostatic hyperplasia with urinary obstruction   • Carpal tunnel syndrome, bilateral   • Coronary artery disease   • Degeneration of intervertebral disc of lumbosacral region   • Degenerative arthritis   • Dyspnea on exertion   • Encounter for immunization   • Gastroesophageal reflux disease with hiatal hernia   • History of gastrointestinal disease   • Hyperlipidemia   • Hypertension   • Cardiac disease   • Obesity (BMI 30-39.9)   • Pulmonary sarcoidosis (HCC)   • Rhinitis medicamentosa   • Spinal stenosis, lumbar   • Status post hip replacement   • Unstable angina (HCC)   • Seasonal allergies   • Insomnia     MDM:    1.  Chest pain:    Patient is having recurrent chest pain.  He had stress test recently which was negative.  However patient continues to have chest pain.  Because of his previous history of coronary artery disease and stenting I would recommend to proceed with cardiac catheterization as per suggestion of Dr. Kalyn Mao.    2.  CAD:    Patient has history of previous coronary artery stenting.    3.  Hyperlipidemia:    Patient is on Crestor continue current therapy.    John Perez MD  07/09/22  12:30 EDT

## 2022-07-09 NOTE — PLAN OF CARE
Problem: Adult Inpatient Plan of Care  Goal: Plan of Care Review  Outcome: Ongoing, Not Progressing  Flowsheets (Taken 7/9/2022 0600)  Progress: no change  Plan of Care Reviewed With: patient  Outcome Evaluation: patient to have heart cath, VSS, SR/SB on the monitor, cardiology following, will monitor, pt stable  Goal: Patient-Specific Goal (Individualized)  Outcome: Ongoing, Not Progressing  Goal: Absence of Hospital-Acquired Illness or Injury  Outcome: Ongoing, Not Progressing  Intervention: Prevent and Manage VTE (Venous Thromboembolism) Risk  Recent Flowsheet Documentation  Taken 7/8/2022 2000 by Jennifer Arellano, RN  VTE Prevention/Management:   bilateral   dorsiflexion/plantar flexion performed  Goal: Optimal Comfort and Wellbeing  Outcome: Ongoing, Not Progressing  Intervention: Provide Person-Centered Care  Recent Flowsheet Documentation  Taken 7/8/2022 2000 by Jennifer Arellano RN  Trust Relationship/Rapport:   care explained   choices provided   emotional support provided   empathic listening provided   questions answered   questions encouraged   reassurance provided   thoughts/feelings acknowledged  Goal: Readiness for Transition of Care  Outcome: Ongoing, Not Progressing     Problem: Chest Pain  Goal: Resolution of Chest Pain Symptoms  Outcome: Ongoing, Not Progressing   Goal Outcome Evaluation:  Plan of Care Reviewed With: patient        Progress: no change  Outcome Evaluation: patient to have heart cath, VSS, SR/SB on the monitor, cardiology following, will monitor, pt stable

## 2022-07-09 NOTE — OUTREACH NOTE
Patient was up on the unit this AM. He followed prompts from staff to come to the nurses station to get his meals trays and medications. Staff observed that he had eaten over 75% of both his breakfast and lunch. Pt is extremely guarded during attempts for a 1:1 with staff. He was slow to respond to questions and appeared to be minimizing his symptoms. He denied current SI, HI or AVH but staff observed him talking to himself in his room and he presents as psychotic and agitated. RN offered PRN Zyprexa this afternoon but he refused. He remained isolative to his room for a majority of the shift. He did not display any aggressive, threatening or unsafe behaviors this shift but staff will continue to monitor and follow the current treatment plan in place.    Prep Survey    Flowsheet Row Responses   [facility] patient discharged from? [facility]   Is LACE score < 7 ? Yes   Emergency Room discharge w/ pulse ox? No   Eligibility [facility]   Date of Admission 07/08/22   Date of Discharge 07/09/22   Discharge Disposition Home or Self Care   Discharge diagnosis Chest pain   Does the patient have one of the following disease processes/diagnoses(primary or secondary)? Other   Does the patient have Home health ordered? No   Is there a DME ordered? No   Prep survey completed? Yes          MARLO LAMA - Registered Nurse

## 2022-07-09 NOTE — H&P
[facility] Observation Unit H&P    Patient Name: Donald Navarro  : 1945  MRN: [id number]  Primary Care Physician: Rama Quintanilla APRN  Date of admission: 2022     Patient Care Team:  Rama Quintanilla APRN as PCP - General  Rama Quintanilla APRN as PCP - Family Medicine  Trey Kennedy MD as Consulting Physician (Cardiology)          Subjective   History Present Illness     Chief Complaint:   Chief Complaint   Patient presents with   • Chest Pain     Chest Pain     Mr. Navarro is a 77 y.o.  presents to [facility] complaining of chest pain       History of Present Illness    ED 22:   Patient is a 77-year-old white male with history of hypertension, high cholesterol, CAD with prior stents and MI.  He presents today with complaints of chest pain.  States it started around 5 AM this morning and was severe.  He states it is slightly subsided since arrival to the ED.  States has been having some intermittent pain for the last week or so.  He states he saw his cardiologist earlier this week and was scheduled to have a heart cath next week however he states his insurance has declined it until he proceeds with stress test first.  He denies any associated shortness of breath, nausea or sweats.  He describes a pressure and burning pain in his left chest that goes into his left arm without alleviating or exacerbating factors.  He denies any fever chills cough congestion leg pain swelling or other complaint.    OBS 22: Patient is a 77-year-old male presenting with chest pain.  Patient has a history of coronary artery disease with stents.  Patient reported chest pain yesterday with improved symptoms from nitroglycerin.  Patient states he was scheduled to have a stress test next week due to insurance declining heart cath first.  Patient reports 2 episodes of chest pain with relief symptoms from nitro.  Reports left-sided chest pain is burning without radiation. Denies nausea, vomiting, abdominal  pain, edema, diaphoresis, cough, dyspnea, or fever.  Patient scheduled to have heart cath today.    Review of Systems   Constitutional: Positive for malaise/fatigue.   HENT: Negative.    Eyes: Negative.    Cardiovascular: Positive for chest pain.   Respiratory: Negative.    Endocrine: Negative.    Hematologic/Lymphatic: Negative.    Skin: Negative.    Musculoskeletal: Negative.    Gastrointestinal: Negative.    Genitourinary: Negative.    Neurological: Negative.    Psychiatric/Behavioral: Negative.    Allergic/Immunologic: Negative.            Personal History     Past Medical History:   Past Medical History:   Diagnosis Date   • CHF (congestive heart failure) (HCC)    • Coronary artery disease    • GERD (gastroesophageal reflux disease)    • Hyperlipidemia    • Hypertension    • Myocardial infarction (HCC)        Surgical History:      Past Surgical History:   Procedure Laterality Date   • CARDIAC CATHETERIZATION  01/2007    TIA: proximal LAD   • COLONOSCOPY     • HERNIA REPAIR     • TOTAL HIP ARTHROPLASTY Left            Family History: family history includes Heart disease in his father. Otherwise pertinent FHx was reviewed and unremarkable.     Social History:  reports that he has never smoked. He has never used smokeless tobacco. He reports that he does not drink alcohol and does not use drugs.      Medications:  Prior to Admission medications    Medication Sig Start Date End Date Taking? Authorizing Provider   aspirin 81 MG EC tablet 81 mg 2 (Two) Times a Day.   Yes ProviderBlayne MD   cetirizine (ZyrTEC Allergy) 10 MG tablet Take 1 tablet by mouth Daily. 2/1/22  Yes Rama Quintanilla APRN   clopidogrel (PLAVIX) 75 MG tablet Take 1 tablet by mouth Daily. 5/6/22  Yes ChemchiriLicha paige MD   diphenhydrAMINE-acetaminophen (TYLENOL PM)  MG tablet per tablet Take 2 tablets by mouth Every Night. 1/10/14  Yes Blayne Scales MD   famotidine (PEPCID) 20 MG tablet Take 20 mg by mouth 2 (Two) Times a  Day.   Yes Blayne Scales MD   fluticasone (FLONASE) 50 MCG/ACT nasal spray 2 sprays into the nostril(s) as directed by provider Daily As Needed for Rhinitis.   Yes Blayne Scales MD   isosorbide mononitrate (IMDUR) 30 MG 24 hr tablet Take 1 tablet by mouth Daily. 12/3/21  Yes Licha Zamora MD   lisinopril (PRINIVIL,ZESTRIL) 5 MG tablet Take 1 tablet by mouth once daily 12/1/21  Yes Licha Zamora MD   metoprolol tartrate (LOPRESSOR) 25 MG tablet Take 1/2 (one-half) tablet by mouth once daily 12/28/21  Yes Licha Zamora MD   rosuvastatin (CRESTOR) 40 MG tablet Take 1 tablet by mouth Every Night. 4/18/22  Yes Licha Zamora MD   omeprazole (PrilOSEC) 20 MG capsule Take 1 capsule by mouth Daily. 2/1/22   Rama Quintanilla APRN   omeprazole (priLOSEC) 40 MG capsule TAKE 1 CAPSULE BY MOUTH IN THE MORNING FOR 30 DAYS 4/29/22   Blayne Scales MD   pseudoephedrine (Sudafed) 30 MG tablet Take 1 tablet by mouth Every 4 (Four) Hours As Needed for Congestion. 2/1/22   Rama Quintanilla APRN   ranolazine (Ranexa) 500 MG 12 hr tablet Take 1 tablet by mouth 2 (Two) Times a Day. 7/7/22   Lorri Dupont APRN       Allergies:    Allergies   Allergen Reactions   • Adhesive Tape Itching       Objective   Objective     Vital Signs  Temp:  [97.5 °F (36.4 °C)-98.2 °F (36.8 °C)] 98.2 °F (36.8 °C)  Heart Rate:  [45-72] 62  Resp:  [16-17] 16  BP: (126-160)/(78-90) 148/87  SpO2:  [97 %-98 %] 97 %  on   ;   Device (Oxygen Therapy): room air  Body mass index is 31.33 kg/m².    Physical Exam  Vitals and nursing note reviewed.   Constitutional:       Appearance: Normal appearance.   HENT:      Head: Normocephalic and atraumatic.      Right Ear: External ear normal.      Left Ear: External ear normal.      Nose: Nose normal.      Mouth/Throat:      Mouth: Mucous membranes are moist.      Pharynx: Oropharynx is clear.   Eyes:      Extraocular Movements: Extraocular movements intact.      Conjunctiva/sclera:  Conjunctivae normal.      Pupils: Pupils are equal, round, and reactive to light.   Cardiovascular:      Rate and Rhythm: Normal rate and regular rhythm.      Pulses: Normal pulses.      Heart sounds: Normal heart sounds.   Pulmonary:      Effort: Pulmonary effort is normal.      Breath sounds: Normal breath sounds.   Abdominal:      General: Bowel sounds are normal.      Palpations: Abdomen is soft.   Musculoskeletal:         General: Normal range of motion.      Cervical back: Normal range of motion.   Skin:     General: Skin is warm.      Capillary Refill: Capillary refill takes less than 2 seconds.   Neurological:      General: No focal deficit present.      Mental Status: He is alert and oriented to person, place, and time. Mental status is at baseline.   Psychiatric:         Mood and Affect: Mood normal.         Behavior: Behavior normal.         Thought Content: Thought content normal.         Judgment: Judgment normal.           Results Review:  I have personally reviewed most recent cardiac tracings, lab results, microbiology results and radiology images and interpretations and agree with findings, most notably: CBC, CMP, EKG, chest x-ray, stress test, troponin, BNP.     Results from last 7 days   Lab Units 07/09/22  0510 07/08/22  1120   WBC 10*3/mm3 6.80 6.90   HEMOGLOBIN g/dL 14.6 15.1   HEMATOCRIT % 45.3 46.0   PLATELETS 10*3/mm3 169 201   INR   --  1.12*     Results from last 7 days   Lab Units 07/09/22  0733 07/08/22  1536 07/08/22  1120   SODIUM mmol/L 138  --  139   POTASSIUM mmol/L 4.6  --  4.3   CHLORIDE mmol/L 105  --  104   CO2 mmol/L 23.0  --  22.0   BUN mg/dL 13  --  10   CREATININE mg/dL 0.92  --  0.85   GLUCOSE mg/dL 99  --  110*   CALCIUM mg/dL 8.4*  --  8.7   ALT (SGPT) U/L 14  --  15   AST (SGOT) U/L 22  --  23   TROPONIN T ng/mL <0.010 <0.010 <0.010   PROBNP pg/mL  --  139.4  --      Estimated Creatinine Clearance: 86.9 mL/min (by C-G formula based on SCr of 0.92 mg/dL).  Brief Urine Lab  Results     None          Microbiology Results (last 10 days)     Procedure Component Value - Date/Time    COVID PRE-OP / PRE-PROCEDURE SCREENING ORDER (NO ISOLATION) - Swab, Nasopharynx [[id number]]  (Normal) Collected: 07/08/22 1242    Lab Status: Final result Specimen: Swab from Nasopharynx Updated: 07/08/22 1306    Narrative:      The following orders were created for panel order COVID PRE-OP / PRE-PROCEDURE SCREENING ORDER (NO ISOLATION) - Swab, Nasopharynx.  Procedure                               Abnormality         Status                     ---------                               -----------         ------                     COVID-19,CEPHEID/NIR,CO...[[id number]]  Normal              Final result                 Please view results for these tests on the individual orders.    COVID-19,CEPHEID/NIR,COR/FIONA/PAD/MONE IN-HOUSE(OR EMERGENT/ADD-ON),NP SWAB IN TRANSPORT MEDIA 3-4 HR TAT, RT-PCR - Swab, Nasopharynx [[id number]]  (Normal) Collected: 07/08/22 1242    Lab Status: Final result Specimen: Swab from Nasopharynx Updated: 07/08/22 1306     COVID19 Not Detected    Narrative:      Fact sheet for providers: https://www.fda.gov/media/319383/download     Fact sheet for patients: https://www.fda.gov/media/208517/download  Fact sheet for providers: https://www.fda.gov/media/821515/download    Fact sheet for patients: https://www.fda.gov/media/019731/download    Test performed by PCR.          ECG/EMG Results (most recent)     Procedure Component Value Units Date/Time    ECG 12 Lead [[id number]] Collected: 07/08/22 1556     Updated: 07/08/22 1557     QT Interval 471 ms     Narrative:      HEART RATE= 55  bpm  RR Interval= 1096  ms  VT Interval= 153  ms  P Horizontal Axis= 41  deg  P Front Axis= 70  deg  QRSD Interval= 95  ms  QT Interval= 471  ms  QRS Axis= 71  deg  T Wave Axis= 4  deg  - OTHERWISE NORMAL ECG -  Sinus bradycardia  When compared with ECG of 04-Mar-2020 10:00:36,  No significant change  Electronically  Signed By:   Date and Time of Study: 2022-07-08 15:56:05    ECG 12 Lead [[id number]] Collected: 07/09/22 0253     Updated: 07/09/22 0254     QT Interval 459 ms     Narrative:      HEART RATE= 57  bpm  RR Interval= 1052  ms  TX Interval= 147  ms  P Horizontal Axis= 36  deg  P Front Axis= -26  deg  QRSD Interval= 92  ms  QT Interval= 459  ms  QRS Axis= -5  deg  T Wave Axis= 47  deg  - OTHERWISE NORMAL ECG -  Sinus bradycardia  Electronically Signed By:   Date and Time of Study: 2022-07-09 02:53:09                  XR Chest 1 View    Result Date: 7/8/2022   1. An acute pulmonary process is not apparent.  Electronically Signed By-Perez Butterfield MD On:7/8/2022 11:56 AM This report was finalized on [id number] by  Perez Butterfield MD.        Estimated Creatinine Clearance: 86.9 mL/min (by C-G formula based on SCr of 0.92 mg/dL).    Assessment & Plan   Assessment/Plan       Active Hospital Problems    Diagnosis  POA   • Chest pain [R07.9]  Yes      Resolved Hospital Problems   No resolved problems to display.     Chest Pain   - CXR: No acute cardio pulmonary process  - EKG: Sinus bradycardia   - Monitor serial troponin; last troponin 0.010  - Continue cardiac monitoring  - Cardiac catheterization January 2018   - Stress test (3/5/20): small fixed distal inferolateral defect w/o ischemia EF 60%  -Cardiac catheterization on 7/9  - NPO after midnight  - Hold Beta Blocker if stress test  - Continue NTG SL PRN for CP if systolic bp above 90mmHg  - Continue ASA  - Cardiology consult     Coronary artery disease with PCI  -Continue Ranexa, Plavix and Crestor  -Continuous cardiac monitoring  -Cardiology consult    Chronic hypertension  -Continue isosorbide, lisinopril, metoprolol    GERD  -Continue PPI    Seasonal allergies  -Continue Zyrtec        VTE Prophylaxis -   Mechanical Order History:     None      Pharmalogical Order History:     None          CODE STATUS:    Code Status and Medical Interventions:   Ordered at: 07/08/22 1521      Level Of Support Discussed With:    Patient     Code Status (Patient has no pulse and is not breathing):    CPR (Attempt to Resuscitate)     Medical Interventions (Patient has pulse or is breathing):    Full Support       This patient has been examined wearing personal protective equipment.     I discussed the patient's findings and my recommendations with patient, family, nursing staff, primary care team and consulting provider.      Signature:Electronically signed by CHEMO Hodges, 07/09/22, 8:26 AM EDT.

## 2022-07-10 NOTE — DISCHARGE SUMMARY
[facility]    Rama Quintanilla APRN    CHIEF COMPLAINT:     Chest Pain     HISTORY OF PRESENT ILLNESS:    \A [facility] of [place] [facility]\""    ED 7/8/22:   Patient is a 77-year-old white male with history of hypertension, high cholesterol, CAD with prior stents and MI.  He presents today with complaints of chest pain.  States it started around 5 AM this morning and was severe.  He states it is slightly subsided since arrival to the ED.  States has been having some intermittent pain for the last week or so.  He states he saw his cardiologist earlier this week and was scheduled to have a heart cath next week however he states his insurance has declined it until he proceeds with stress test first.  He denies any associated shortness of breath, nausea or sweats.  He describes a pressure and burning pain in his left chest that goes into his left arm without alleviating or exacerbating factors.  He denies any fever chills cough congestion leg pain swelling or other complaint.     OBS 7/9/22: Patient is a 77-year-old male presenting with chest pain.  Patient has a history of coronary artery disease with stents.  Patient reported chest pain yesterday with improved symptoms from nitroglycerin.  Patient states he was scheduled to have a stress test next week due to insurance declining heart cath first.  Patient reports 2 episodes of chest pain with relief symptoms from nitro.  Reports left-sided chest pain is burning without radiation. Denies nausea, vomiting, abdominal pain, edema, diaphoresis, cough, dyspnea, or fever.  Patient scheduled to have heart cath today.     Past Medical History:   Diagnosis Date   • CHF (congestive heart failure) (HCC)    • Coronary artery disease    • GERD (gastroesophageal reflux disease)    • Hyperlipidemia    • Hypertension    • Myocardial infarction (HCC)      Past Surgical History:   Procedure Laterality Date   • CARDIAC CATHETERIZATION  01/2007    TIA: proximal LAD   • COLONOSCOPY     • HERNIA  REPAIR     • TOTAL HIP ARTHROPLASTY Left      Family History   Problem Relation Age of Onset   • Heart disease Father      Social History     Tobacco Use   • Smoking status: Never Smoker   • Smokeless tobacco: Never Used   Vaping Use   • Vaping Use: Never used   Substance Use Topics   • Alcohol use: No   • Drug use: No     No medications prior to admission.     Allergies:  Adhesive tape    Immunization History   Administered Date(s) Administered   • Fluad Quad 65+ 10/18/2019, 09/17/2020   • Fluzone High Dose =>65 Years ([employer] ONLY) 10/09/2015, 10/17/2016, 10/11/2017, 09/24/2018   • Pneumococcal Conjugate 13-Valent (PCV13) 10/26/2016   • Pneumococcal Polysaccharide (PPSV23) 11/25/2019           REVIEW OF SYSTEMS:    Review of Systems   Constitutional: Negative.   HENT: Negative.    Eyes: Negative.    Cardiovascular: Negative.    Respiratory: Negative.    Endocrine: Negative.    Hematologic/Lymphatic: Negative.    Skin: Negative.    Musculoskeletal: Negative.    Gastrointestinal: Negative.    Genitourinary: Negative.    Neurological: Negative.    Psychiatric/Behavioral: Negative.    Allergic/Immunologic: Negative.        Vital Signs  Temp:  [97.8 °F (36.6 °C)-97.9 °F (36.6 °C)] 97.9 °F (36.6 °C)  Heart Rate:  [54-65] 60  Resp:  [16-18] 16  BP: (103-133)/(65-88) 106/73          Physical Exam:  Physical Exam  Vitals and nursing note reviewed.   Constitutional:       Appearance: Normal appearance.   HENT:      Head: Normocephalic and atraumatic.      Right Ear: External ear normal.      Left Ear: External ear normal.      Nose: Nose normal.      Mouth/Throat:      Mouth: Mucous membranes are moist.      Pharynx: Oropharynx is clear.   Eyes:      Conjunctiva/sclera: Conjunctivae normal.      Pupils: Pupils are equal, round, and reactive to light.   Cardiovascular:      Rate and Rhythm: Normal rate and regular rhythm.      Pulses: Normal pulses.      Heart sounds: Normal heart sounds.   Pulmonary:      Effort: Pulmonary  effort is normal.      Breath sounds: Normal breath sounds.   Abdominal:      General: Bowel sounds are normal.      Palpations: Abdomen is soft.   Musculoskeletal:         General: Normal range of motion.      Cervical back: Normal range of motion.   Skin:     General: Skin is warm.      Capillary Refill: Capillary refill takes less than 2 seconds.   Neurological:      General: No focal deficit present.      Mental Status: He is alert and oriented to person, place, and time. Mental status is at baseline.   Psychiatric:         Mood and Affect: Mood normal.         Behavior: Behavior normal.         Thought Content: Thought content normal.         Judgment: Judgment normal.         Emotional Behavior:    WNL   Debilities:   none  Results Review:    I reviewed the patient's new clinical results.  Lab Results (most recent)     Procedure Component Value Units Date/Time    Basic Metabolic Panel [[id number]]  (Abnormal) Collected: 07/09/22 1644    Specimen: Blood Updated: 07/09/22 1713     Glucose 165 mg/dL      BUN 14 mg/dL      Creatinine 0.96 mg/dL      Sodium 134 mmol/L      Potassium 4.2 mmol/L      Comment: Slight hemolysis detected by analyzer. Results may be affected.        Chloride 102 mmol/L      CO2 22.0 mmol/L      Calcium 8.3 mg/dL      BUN/Creatinine Ratio 14.6     Anion Gap 10.0 mmol/L      eGFR 82.4 mL/min/1.73      Comment: National Kidney Foundation and American Society of Nephrology (ASN) Task Force recommended calculation based on the Chronic Kidney Disease Epidemiology Collaboration (CKD-EPI) equation refit without adjustment for race.       Narrative:      GFR Normal >60  Chronic Kidney Disease <60  Kidney Failure <15      Protime-INR [[id number]]  (Abnormal) Collected: 07/09/22 1644    Specimen: Blood Updated: 07/09/22 1711     Protime 11.7 Seconds      INR 1.14    CBC (No Diff) [[id number]]  (Normal) Collected: 07/09/22 1644    Specimen: Blood Updated: 07/09/22 1654     WBC 5.90 10*3/mm3      RBC  5.06 10*6/mm3      Hemoglobin 14.7 g/dL      Hematocrit 45.2 %      MCV 89.3 fL      MCH 29.1 pg      MCHC 32.6 g/dL      RDW 14.3 %      RDW-SD 44.6 fl      MPV 8.9 fL      Platelets 165 10*3/mm3     Comprehensive Metabolic Panel [[id number]]  (Abnormal) Collected: 07/09/22 0733    Specimen: Blood Updated: 07/09/22 0801     Glucose 99 mg/dL      BUN 13 mg/dL      Creatinine 0.92 mg/dL      Sodium 138 mmol/L      Potassium 4.6 mmol/L      Comment: Slight hemolysis detected by analyzer. Results may be affected.        Chloride 105 mmol/L      CO2 23.0 mmol/L      Calcium 8.4 mg/dL      Total Protein 6.2 g/dL      Albumin 3.70 g/dL      ALT (SGPT) 14 U/L      AST (SGOT) 22 U/L      Comment: Slight hemolysis detected by analyzer. Results may be affected.        Alkaline Phosphatase 77 U/L      Total Bilirubin 0.6 mg/dL      Globulin 2.5 gm/dL      A/G Ratio 1.5 g/dL      BUN/Creatinine Ratio 14.1     Anion Gap 10.0 mmol/L      eGFR 86.7 mL/min/1.73      Comment: National Kidney Foundation and American Society of Nephrology (ASN) Task Force recommended calculation based on the Chronic Kidney Disease Epidemiology Collaboration (CKD-EPI) equation refit without adjustment for race.       Narrative:      GFR Normal >60  Chronic Kidney Disease <60  Kidney Failure <15      Troponin [[id number]]  (Normal) Collected: 07/09/22 0733    Specimen: Blood Updated: 07/09/22 0800     Troponin T <0.010 ng/mL     Narrative:      Troponin T Reference Range:  <= 0.03 ng/mL-   Negative for AMI  >0.03 ng/mL-     Abnormal for myocardial necrosis.  Clinicians would have to utilize clinical acumen, EKG, Troponin and serial changes to determine if it is an Acute Myocardial Infarction or myocardial injury due to an underlying chronic condition.       Results may be falsely decreased if patient taking Biotin.      CBC Auto Differential [[id number]]  (Abnormal) Collected: 07/09/22 0510    Specimen: Blood Updated: 07/09/22 0608     WBC 6.80 10*3/mm3       RBC 4.97 10*6/mm3      Hemoglobin 14.6 g/dL      Hematocrit 45.3 %      MCV 91.1 fL      MCH 29.5 pg      MCHC 32.3 g/dL      RDW 14.3 %      RDW-SD 45.9 fl      MPV 9.3 fL      Platelets 169 10*3/mm3      Neutrophil % 62.1 %      Lymphocyte % 19.4 %      Monocyte % 12.7 %      Eosinophil % 4.8 %      Basophil % 1.0 %      Neutrophils, Absolute 4.20 10*3/mm3      Lymphocytes, Absolute 1.30 10*3/mm3      Monocytes, Absolute 0.90 10*3/mm3      Eosinophils, Absolute 0.30 10*3/mm3      Basophils, Absolute 0.10 10*3/mm3      nRBC 0.1 /100 WBC     BNP [[id number]]  (Normal) Collected: 07/08/22 1536    Specimen: Blood Updated: 07/08/22 1621     proBNP 139.4 pg/mL     Narrative:      Among patients with dyspnea, NT-proBNP is highly sensitive for the detection of acute congestive heart failure. In addition NT-proBNP of <300 pg/ml effectively rules out acute congestive heart failure with 99% negative predictive value.    Results may be falsely decreased if patient taking Biotin.      Troponin [[id number]]  (Normal) Collected: 07/08/22 1536    Specimen: Blood Updated: 07/08/22 1621     Troponin T <0.010 ng/mL     Narrative:      Troponin T Reference Range:  <= 0.03 ng/mL-   Negative for AMI  >0.03 ng/mL-     Abnormal for myocardial necrosis.  Clinicians would have to utilize clinical acumen, EKG, Troponin and serial changes to determine if it is an Acute Myocardial Infarction or myocardial injury due to an underlying chronic condition.       Results may be falsely decreased if patient taking Biotin.      CK [[id number]]  (Normal) Collected: 07/08/22 1536    Specimen: Blood Updated: 07/08/22 1614     Creatine Kinase 93 U/L     COVID PRE-OP / PRE-PROCEDURE SCREENING ORDER (NO ISOLATION) - Swab, Nasopharynx [[id number]]  (Normal) Collected: 07/08/22 1242    Specimen: Swab from Nasopharynx Updated: 07/08/22 1306    Narrative:      The following orders were created for panel order COVID PRE-OP / PRE-PROCEDURE SCREENING ORDER (NO  ISOLATION) - Swab, Nasopharynx.  Procedure                               Abnormality         Status                     ---------                               -----------         ------                     COVID-19,CEPHEID/NIR,CO...[[id number]]  Normal              Final result                 Please view results for these tests on the individual orders.    COVID-19,CEPHEID/NIR,COR/FIONA/PAD/MONE IN-HOUSE(OR EMERGENT/ADD-ON),NP SWAB IN TRANSPORT MEDIA 3-4 HR TAT, RT-PCR - Swab, Nasopharynx [[id number]]  (Normal) Collected: 07/08/22 1242    Specimen: Swab from Nasopharynx Updated: 07/08/22 1306     COVID19 Not Detected    Narrative:      Fact sheet for providers: https://www.fda.gov/media/073356/download     Fact sheet for patients: https://www.fda.gov/media/021279/download  Fact sheet for providers: https://www.fda.gov/media/056478/download    Fact sheet for patients: https://www.fda.gov/media/765468/download    Test performed by PCR.    [place] Draw [[id number]] Collected: 07/08/22 1120    Specimen: Blood Updated: 07/08/22 1232    Narrative:      The following orders were created for panel order [place] Draw.  Procedure                               Abnormality         Status                     ---------                               -----------         ------                     Green Top (Gel)[[id number]]                                  Final result               Lavender Top[[id number]]                                     Final result               Gold Top - SST[[id number]]                                   Final result               Light Blue Top[[id number]]                                   Final result                 Please view results for these tests on the individual orders.    Light Blue Top [[id number]] Collected: 07/08/22 1120    Specimen: Blood Updated: 07/08/22 1232     Extra Tube Hold for add-ons.     Comment: Auto resulted       Green Top (Gel) [[id number]] Collected: 07/08/22 1120    Specimen: Blood  Updated: 07/08/22 1232     Extra Tube Hold for add-ons.     Comment: Auto resulted.       Lavender Top [[id number]] Collected: 07/08/22 1120    Specimen: Blood Updated: 07/08/22 1232     Extra Tube hold for add-on     Comment: Auto resulted       Gold Top - SST [[id number]] Collected: 07/08/22 1120    Specimen: Blood Updated: 07/08/22 1232     Extra Tube Hold for add-ons.     Comment: Auto resulted.       Protime-INR [[id number]]  (Abnormal) Collected: 07/08/22 1120    Specimen: Blood Updated: 07/08/22 1206     Protime 11.5 Seconds      INR 1.12    aPTT [[id number]]  (Abnormal) Collected: 07/08/22 1120    Specimen: Blood Updated: 07/08/22 1206     PTT 30.8 seconds     CBC & Differential [[id number]]  (Abnormal) Collected: 07/08/22 1120    Specimen: Blood Updated: 07/08/22 1158    Narrative:      The following orders were created for panel order CBC & Differential.  Procedure                               Abnormality         Status                     ---------                               -----------         ------                     CBC Auto Differential[[id number]]        Abnormal            Final result                 Please view results for these tests on the individual orders.    CBC Auto Differential [[id number]]  (Abnormal) Collected: 07/08/22 1120    Specimen: Blood Updated: 07/08/22 1158     WBC 6.90 10*3/mm3      RBC 5.15 10*6/mm3      Hemoglobin 15.1 g/dL      Hematocrit 46.0 %      MCV 89.3 fL      MCH 29.4 pg      MCHC 32.9 g/dL      RDW 14.2 %      RDW-SD 43.8 fl      MPV 9.1 fL      Platelets 201 10*3/mm3      Neutrophil % 64.9 %      Lymphocyte % 19.2 %      Monocyte % 11.0 %      Eosinophil % 3.9 %      Basophil % 1.0 %      Neutrophils, Absolute 4.50 10*3/mm3      Lymphocytes, Absolute 1.30 10*3/mm3      Monocytes, Absolute 0.80 10*3/mm3      Eosinophils, Absolute 0.30 10*3/mm3      Basophils, Absolute 0.10 10*3/mm3      nRBC 0.0 /100 WBC     Comprehensive Metabolic Panel [[id number]]  (Abnormal)  Collected: 07/08/22 1120    Specimen: Blood Updated: 07/08/22 1155     Glucose 110 mg/dL      BUN 10 mg/dL      Creatinine 0.85 mg/dL      Sodium 139 mmol/L      Potassium 4.3 mmol/L      Comment: Slight hemolysis detected by analyzer. Results may be affected.        Chloride 104 mmol/L      CO2 22.0 mmol/L      Calcium 8.7 mg/dL      Total Protein 6.6 g/dL      Albumin 4.10 g/dL      ALT (SGPT) 15 U/L      AST (SGOT) 23 U/L      Alkaline Phosphatase 83 U/L      Total Bilirubin 0.6 mg/dL      Globulin 2.5 gm/dL      A/G Ratio 1.6 g/dL      BUN/Creatinine Ratio 11.8     Anion Gap 13.0 mmol/L      eGFR 90.6 mL/min/1.73      Comment: National Kidney Foundation and American Society of Nephrology (ASN) Task Force recommended calculation based on the Chronic Kidney Disease Epidemiology Collaboration (CKD-EPI) equation refit without adjustment for race.       Narrative:      GFR Normal >60  Chronic Kidney Disease <60  Kidney Failure <15            Imaging Results (Most Recent)     Procedure Component Value Units Date/Time    XR Chest 1 View [[id number]] Collected: 07/08/22 1156     Updated: 07/08/22 1159    Narrative:      DATE OF EXAM:  7/8/2022 11:49 AM     PROCEDURE:  XR CHEST 1 VW-     INDICATIONS:  cp; R07.9-Chest pain, unspecified     COMPARISON:  March 4, 2020     TECHNIQUE:   Single radiographic AP view of the chest was obtained.     FINDINGS:  The heart is not definitely enlarged. The lungs seem relatively clear.  There are no pleural effusions.        Impression:         1. An acute pulmonary process is not apparent.     Electronically Signed By-Perez Butterfield MD On:7/8/2022 11:56 AM  This report was finalized on [id number] by  Perez Butterfield MD.        reviewed    ECG/EMG Results (most recent)     Procedure Component Value Units Date/Time    ECG 12 Lead [[id number]] Collected: 07/08/22 1556     Updated: 07/08/22 1557     QT Interval 471 ms     Narrative:      HEART RATE= 55  bpm  RR Interval= 1096  ms  SC  Interval= 153  ms  P Horizontal Axis= 41  deg  P Front Axis= 70  deg  QRSD Interval= 95  ms  QT Interval= 471  ms  QRS Axis= 71  deg  T Wave Axis= 4  deg  - OTHERWISE NORMAL ECG -  Sinus bradycardia  When compared with ECG of 04-Mar-2020 10:00:36,  No significant change  Electronically Signed By:   Date and Time of Study: 2022-07-08 15:56:05    ECG 12 Lead [[id number]] Collected: 07/09/22 0253     Updated: 07/09/22 0254     QT Interval 459 ms     Narrative:      HEART RATE= 57  bpm  RR Interval= 1052  ms  MT Interval= 147  ms  P Horizontal Axis= 36  deg  P Front Axis= -26  deg  QRSD Interval= 92  ms  QT Interval= 459  ms  QRS Axis= -5  deg  T Wave Axis= 47  deg  - OTHERWISE NORMAL ECG -  Sinus bradycardia  Electronically Signed By:   Date and Time of Study: 2022-07-09 02:53:09        reviewed            Microbiology Results (last 10 days)     Procedure Component Value - Date/Time    COVID PRE-OP / PRE-PROCEDURE SCREENING ORDER (NO ISOLATION) - Swab, Nasopharynx [[id number]]  (Normal) Collected: 07/08/22 1242    Lab Status: Final result Specimen: Swab from Nasopharynx Updated: 07/08/22 1306    Narrative:      The following orders were created for panel order COVID PRE-OP / PRE-PROCEDURE SCREENING ORDER (NO ISOLATION) - Swab, Nasopharynx.  Procedure                               Abnormality         Status                     ---------                               -----------         ------                     COVID-19,CEPHEID/NIR,CO...[[id number]]  Normal              Final result                 Please view results for these tests on the individual orders.    COVID-19,CEPHEID/NIR,COR/FIONA/PAD/MONE IN-HOUSE(OR EMERGENT/ADD-ON),NP SWAB IN TRANSPORT MEDIA 3-4 HR TAT, RT-PCR - Swab, Nasopharynx [[id number]]  (Normal) Collected: 07/08/22 1242    Lab Status: Final result Specimen: Swab from Nasopharynx Updated: 07/08/22 1306     COVID19 Not Detected    Narrative:      Fact sheet for providers:  https://www.fda.gov/media/729745/download     Fact sheet for patients: https://www.fda.gov/media/526102/download  Fact sheet for providers: https://www.fda.gov/media/851761/download    Fact sheet for patients: https://www.[employer].gov/media/503751/download    Test performed by PCR.          Assessment & Plan     Unstable angina (HCC)    Chest pain        Chest Pain   - CXR: No acute cardio pulmonary process  - EKG: Sinus bradycardia   - Monitor serial troponin; last troponin 0.010  - Continue cardiac monitoring  - Cardiac catheterization January 2018   - Stress test (3/5/20): small fixed distal inferolateral defect w/o ischemia EF 60%  -Cardiac catheterization on 7/9  - NPO after midnight  - Hold Beta Blocker if stress test  - Continue NTG SL PRN for CP if systolic bp above 90mmHg  - Continue ASA  - Cardiology consult      Coronary artery disease with PCI  -Continue Ranexa, Plavix and Crestor  -Continuous cardiac monitoring  -Cardiology consult     Chronic hypertension  -Continue isosorbide, lisinopril, metoprolol     GERD  -Continue PPI     Seasonal allergies  -Continue Zyrtec    I discussed the patients findings and my recommendations with patient and family.     Discharge Diagnosis:      Unstable angina (HCC)    Chest pain      Hospital Course  Patient is a 77 y.o. male presented with chest pain.  Patient reported relief with nitroglycerin.  Denies nausea, vomiting, abdominal pain, diaphoresis, cough, dyspnea, or syncope.  Patient underwent cardiac catheterization with 25 to 30% in mid LDA.  25 to 30% stenosis total left circumflex Coy artery.  RCA has 30 to 40% stenosis.  Nonobstructive CAD and patient to medically manage.  Patient to follow-up with cardiologist in 2 weeks.  Patient to follow-up with primary care for continued care management.  If symptoms worsen, call 911 or go to nearest emergency room.  Patient and family verbally under stood and I went over results and recommendations.  They agree to treatment  plan.    Past Medical History:     Past Medical History:   Diagnosis Date   • CHF (congestive heart failure) (HCC)    • Coronary artery disease    • GERD (gastroesophageal reflux disease)    • Hyperlipidemia    • Hypertension    • Myocardial infarction (HCC)        Past Surgical History:     Past Surgical History:   Procedure Laterality Date   • CARDIAC CATHETERIZATION  01/2007    TIA: proximal LAD   • COLONOSCOPY     • HERNIA REPAIR     • TOTAL HIP ARTHROPLASTY Left        Social History:   Social History     Socioeconomic History   • Marital status:    Tobacco Use   • Smoking status: Never Smoker   • Smokeless tobacco: Never Used   Vaping Use   • Vaping Use: Never used   Substance and Sexual Activity   • Alcohol use: No   • Drug use: No   • Sexual activity: Defer       Procedures Performed    Procedure(s):  Left Heart Cath possible PCI, atherectomy, hemodynamic support  -------------------       Consults:   Consults     No orders found from 6/9/2022 to 7/9/2022.          Condition on Discharge:     Stable    Discharge Disposition  Home or Self Care    Discharge Medications     Discharge Medications      Continue These Medications      Instructions Start Date   aspirin 81 MG EC tablet   81 mg, 2 Times Daily      cetirizine 10 MG tablet  Commonly known as: ZyrTEC Allergy   10 mg, Oral, Daily      clopidogrel 75 MG tablet  Commonly known as: PLAVIX   75 mg, Oral, Daily      diphenhydrAMINE-acetaminophen  MG tablet per tablet  Commonly known as: TYLENOL PM   2 tablets, Oral, Nightly      famotidine 20 MG tablet  Commonly known as: PEPCID   20 mg, Oral, 2 Times Daily      fluticasone 50 MCG/ACT nasal spray  Commonly known as: FLONASE   2 sprays, Nasal, Daily PRN      isosorbide mononitrate 30 MG 24 hr tablet  Commonly known as: IMDUR   30 mg, Oral, Daily      lisinopril 5 MG tablet  Commonly known as: PRINIVIL,ZESTRIL   Take 1 tablet by mouth once daily      metoprolol tartrate 25 MG tablet  Commonly known  as: LOPRESSOR   Take 1/2 (one-half) tablet by mouth once daily      omeprazole 20 MG capsule  Commonly known as: PrilOSEC   20 mg, Oral, Daily      omeprazole 40 MG capsule  Commonly known as: priLOSEC   TAKE 1 CAPSULE BY MOUTH IN THE MORNING FOR 30 DAYS      pseudoephedrine 30 MG tablet  Commonly known as: Sudafed   30 mg, Oral, Every 4 Hours PRN      ranolazine 500 MG 12 hr tablet  Commonly known as: Ranexa   500 mg, Oral, 2 Times Daily      rosuvastatin 40 MG tablet  Commonly known as: CRESTOR   40 mg, Oral, Nightly             Discharge Diet:   Diet Instructions     Diet: Cardiac      Discharge Diet: Cardiac          Activity at Discharge:   Activity Instructions     Activity as Tolerated            Follow-up Appointments  No future appointments.  Additional Instructions for the Follow-ups that You Need to Schedule     Discharge Follow-up with PCP   As directed       Currently Documented PCP:    Rama Quintanilla APRN    PCP Phone Number:    [phone]     Follow Up Details: 7-10 days         Discharge Follow-up with Specialty: Cardiology; 2 Weeks   As directed      Specialty: Cardiology    Follow Up: 2 Weeks               Test Results Pending at Discharge       Risk for Readmission (LACE) Score: 1 (7/9/2022  6:01 AM)          CHEMO Hodges  07/10/22  06:42 EDT

## 2022-07-11 ENCOUNTER — TRANSITIONAL CARE MANAGEMENT TELEPHONE ENCOUNTER (OUTPATIENT)
Dept: CALL CENTER | Facility: HOSPITAL | Age: 77
End: 2022-07-11

## 2022-07-11 NOTE — OUTREACH NOTE
Call Center TCM Note    Flowsheet Row Responses   [facility] patient discharged from? [facility]   Does the patient have one of the following disease processes/diagnoses(primary or secondary)? Other   TCM attempt successful? No  [ASHLEY Ying,  Yosvany Navarro]   Unsuccessful attempts Attempt 1          Yoli Pastor RN    7/11/2022, 11:25 EDT

## 2022-07-11 NOTE — OUTREACH NOTE
Call Center TCM Note    Flowsheet Row Responses   [facility] patient discharged from? [facility]   Does the patient have one of the following disease processes/diagnoses(primary or secondary)? Other   TCM attempt successful? No  [hang up]   Unsuccessful attempts Attempt 2          Yoli Pastor RN    7/11/2022, 13:54 EDT

## 2022-07-12 ENCOUNTER — TRANSITIONAL CARE MANAGEMENT TELEPHONE ENCOUNTER (OUTPATIENT)
Dept: CALL CENTER | Facility: HOSPITAL | Age: 77
End: 2022-07-12

## 2022-07-12 NOTE — OUTREACH NOTE
Call Center TCM Note    Flowsheet Row Responses   [facility] facility patient discharged from? [facility]   Does the patient have one of the following disease processes/diagnoses(primary or secondary)? Other   TCM attempt successful? No   Unsuccessful attempts Attempt 3  [Pt admitted for procedure today]          Alida Castro LPN    7/12/2022, 10:37 EDT

## 2022-07-13 ENCOUNTER — TELEPHONE (OUTPATIENT)
Dept: CARDIOLOGY | Facility: CLINIC | Age: 77
End: 2022-07-13

## 2022-07-13 NOTE — TELEPHONE ENCOUNTER
PATIENT CALLED IN AND LEFT A VOICEMAIL REGARDING MEDICATION THAT YOU STARTED HIM ON. HE HAS SOME QUESTIONS. PLEASE RETURN CALL [phone]

## 2022-07-13 NOTE — TELEPHONE ENCOUNTER
"Called patient, he woke up with \"heart spasm\" this morning.  Advised that if his blood pressure is okay, to increase his isosorbide to 30mg bid plus ranexa       "

## 2022-07-15 LAB — QT INTERVAL: 466 MS

## 2022-07-20 ENCOUNTER — OFFICE VISIT (OUTPATIENT)
Dept: CARDIOLOGY | Facility: CLINIC | Age: 77
End: 2022-07-20

## 2022-07-20 VITALS
DIASTOLIC BLOOD PRESSURE: 87 MMHG | BODY MASS INDEX: 31.56 KG/M2 | SYSTOLIC BLOOD PRESSURE: 150 MMHG | HEIGHT: 72 IN | OXYGEN SATURATION: 97 % | HEART RATE: 62 BPM | WEIGHT: 233 LBS

## 2022-07-20 DIAGNOSIS — I20.8 STABLE ANGINA PECTORIS: ICD-10-CM

## 2022-07-20 DIAGNOSIS — E78.2 MIXED HYPERLIPIDEMIA: Chronic | ICD-10-CM

## 2022-07-20 DIAGNOSIS — I10 PRIMARY HYPERTENSION: Chronic | ICD-10-CM

## 2022-07-20 DIAGNOSIS — I25.10 CORONARY ARTERY DISEASE INVOLVING NATIVE CORONARY ARTERY OF NATIVE HEART WITHOUT ANGINA PECTORIS: Primary | Chronic | ICD-10-CM

## 2022-07-20 PROCEDURE — 99214 OFFICE O/P EST MOD 30 MIN: CPT | Performed by: INTERNAL MEDICINE

## 2022-07-20 RX ORDER — NITROGLYCERIN 0.4 MG/1
TABLET SUBLINGUAL
Qty: 100 TABLET | Refills: 11 | Status: SHIPPED | OUTPATIENT
Start: 2022-07-20

## 2022-07-20 RX ORDER — ISOSORBIDE MONONITRATE 30 MG/1
30 TABLET, EXTENDED RELEASE ORAL EVERY 12 HOURS
Qty: 60 TABLET | Refills: 3 | Status: SHIPPED | OUTPATIENT
Start: 2022-07-20 | End: 2022-12-07

## 2022-07-20 NOTE — PROGRESS NOTES
Progress note      Name: Donald Navarro ADMIT: (Not on file)   : 1945  PCP: Rama Quintanilla APRN    MRN: [id number] LOS: 0 days   AGE/SEX: 77 y.o. male  ROOM: Room/bed info not found     Chief Complaint   Patient presents with   • Follow-up     2 WK CATH F/U       Subjective       History of present illness  Donald Navarro is a 77-year-old male patient was history of coronary artery disease status post PCI to the LAD in the past, also hypertension, dyslipidemia.  Patient has been complaining of chest pain here lately and he was hospitalized for at in [place] and he underwent a heart catheterization on 2022 which showed nonobstructive lesions.  Patient was placed on Ranexa and here today for follow-up.  He says that he is not tolerating the Ranexa well, it is causing him stomach issues and also dizziness.  He still having episodic chest pains at night.    Past Medical History:   Diagnosis Date   • CHF (congestive heart failure) (HCC)    • Coronary artery disease    • GERD (gastroesophageal reflux disease)    • Hyperlipidemia    • Hypertension    • Myocardial infarction (HCC)      Past Surgical History:   Procedure Laterality Date   • CARDIAC CATHETERIZATION  2007    TIA: proximal LAD   • CARDIAC CATHETERIZATION N/A 2022    Procedure: Left Heart Cath possible PCI, atherectomy, hemodynamic support;  Surgeon: John Perez MD;  Location: [facility] INVASIVE LOC[facility];  Service: Cardiovascular;  Laterality: N/A;   • COLONOSCOPY     • HERNIA REPAIR     • TOTAL HIP ARTHROPLASTY Left      Family History   Problem Relation Age of Onset   • Heart disease Father      Social History     Tobacco Use   • Smoking status: Never Smoker   • Smokeless tobacco: Never Used   Vaping Use   • Vaping Use: Never used   Substance Use Topics   • Alcohol use: No   • Drug use: No     (Not in a hospital admission)    Allergies:  Adhesive tape      Physical Exam  VITALS REVIEWED    General:      well developed, in no acute  distress.    Head:      normocephalic and atraumatic.    Eyes:      PERRL/EOM intact, conjunctiva and sclera clear with out nystagmus.    Neck:      no masses, thyromegaly,  trachea central with normal respiratory effort and PMI displaced laterally  Lungs:      Clear to auscultation bilaterally  Heart:       Regular rate and rhythm  Msk:      no deformity or scoliosis noted of thoracic or lumbar spine.    Pulses:      pulses normal in all 4 extremities.    Extremities:       No lower extremity edema  Neurologic:      no focal deficits.   alert oriented x3  Skin:      intact without lesions or rashes.    Psych:      alert and cooperative; normal mood and affect; normal attention span and concentration.      Result Review :               Pertinent cardiac workup    1.  Heart cath 7/9/2022 ejection fraction 55 to 60%, nonobstructive CAD, diffuse.  2.  EKG 7/8/2022 sinus rhythm      Procedures        Assessment and Plan      Doanld Navarro is a 77-year-old male patient who have coronary artery disease with prior PCI, has stable angina, heart catheterization July 2022, did not show significant disease.  Patient is not tolerating the Ranexa, we will stop that and instead increase the isosorbide to twice a day, I will also give him prescription for sublingual nitroglycerin for breakthrough chest pain.  Otherwise his blood pressures seems to be well controlled, and his rhythm is sinus.  We will make a 6-month follow-up.    Diagnoses and all orders for this visit:    1. Coronary artery disease involving native coronary artery of native heart without angina pectoris (Primary)    2. Primary hypertension    3. Mixed hyperlipidemia    4. Stable angina pectoris (HCC)    Other orders  -     isosorbide mononitrate (IMDUR) 30 MG 24 hr tablet; Take 1 tablet by mouth Every 12 (Twelve) Hours.  Dispense: 60 tablet; Refill: 3  -     nitroglycerin (NITROSTAT) 0.4 MG SL tablet; 1 under the tongue as needed for angina, may repeat q5mins for  up three doses  Dispense: 100 tablet; Refill: 11           No follow-ups on file.  Patient was given instructions and counseling regarding his condition or for health maintenance advice. Please see specific information pulled into the AVS if appropriate.

## 2022-07-26 LAB — QT INTERVAL: 459 MS

## 2022-12-07 RX ORDER — ISOSORBIDE MONONITRATE 30 MG/1
TABLET, EXTENDED RELEASE ORAL
Qty: 180 TABLET | Refills: 1 | Status: SHIPPED | OUTPATIENT
Start: 2022-12-07 | End: 2023-03-17

## 2022-12-07 NOTE — TELEPHONE ENCOUNTER
Rx Refill Note  Requested Prescriptions     Pending Prescriptions Disp Refills   • isosorbide mononitrate (IMDUR) 30 MG 24 hr tablet [Pharmacy Med Name: Isosorbide Mononitrate ER 30 MG Oral Tablet Extended Release 24 Hour] 60 tablet 0     Sig: TAKE 1 TABLET BY MOUTH EVERY 12 HOURS      Last office visit with prescribing clinician: 7/20/2022   Last telemedicine visit with prescribing clinician: Visit date not found   Next office visit with prescribing clinician: Visit date not found                         Would you like a call back once the refill request has been completed: [] Yes [] No    If the office needs to give you a call back, can they leave a voicemail: [] Yes [] No    Nicolasa Singh MA  12/07/22, 13:33 EST

## 2022-12-13 ENCOUNTER — OFFICE VISIT (OUTPATIENT)
Dept: CARDIOLOGY | Facility: CLINIC | Age: 77
End: 2022-12-13

## 2022-12-13 VITALS
BODY MASS INDEX: 32.51 KG/M2 | WEIGHT: 240 LBS | HEIGHT: 72 IN | DIASTOLIC BLOOD PRESSURE: 99 MMHG | SYSTOLIC BLOOD PRESSURE: 159 MMHG | HEART RATE: 61 BPM | OXYGEN SATURATION: 98 %

## 2022-12-13 DIAGNOSIS — I25.10 CORONARY ARTERY DISEASE INVOLVING NATIVE CORONARY ARTERY OF NATIVE HEART WITHOUT ANGINA PECTORIS: Primary | Chronic | ICD-10-CM

## 2022-12-13 DIAGNOSIS — E78.00 PURE HYPERCHOLESTEROLEMIA: Chronic | ICD-10-CM

## 2022-12-13 DIAGNOSIS — I10 PRIMARY HYPERTENSION: Chronic | ICD-10-CM

## 2022-12-13 PROCEDURE — 99214 OFFICE O/P EST MOD 30 MIN: CPT | Performed by: INTERNAL MEDICINE

## 2022-12-13 RX ORDER — AMLODIPINE BESYLATE 2.5 MG/1
5 TABLET ORAL DAILY
Qty: 90 TABLET | Refills: 3 | Status: SHIPPED | OUTPATIENT
Start: 2022-12-13 | End: 2023-03-21

## 2022-12-13 RX ORDER — AMLODIPINE BESYLATE 2.5 MG/1
2.5 TABLET ORAL DAILY
COMMUNITY
Start: 2022-12-09 | End: 2022-12-13 | Stop reason: SDUPTHER

## 2022-12-13 NOTE — PROGRESS NOTES
Progress note      Name: Donald Navarro ADMIT: (Not on file)   : 1945  PCP: Rama Quintanilla APRN    MRN: [id number] LOS: 0 days   AGE/SEX: 77 y.o. male  ROOM: Room/bed info not found     Chief Complaint   Patient presents with   • Coronary Artery Disease       Subjective       History of present illness  Donald Navarro is a 77-year-old male patient was history of coronary artery disease status post PCI to the LAD in the past, also hypertension, dyslipidemia.  Patient was admitted to [facility] in July with chest pain and underwent heart catheterization on 2022 which showed nonobstructive lesions.  He was initially placed on Ranexa but could not tolerate it therefore his Imdur was increased to 30 mg twice a day.  Patient did well for a while, however he is starting to have chest pains again and he was seen in [facility] his enzymes are negative and he was placed on amlodipine 2.5 mg.    Past Medical History:   Diagnosis Date   • CHF (congestive heart failure) ([facility])    • Coronary artery disease    • GERD (gastroesophageal reflux disease)    • Hyperlipidemia    • Hypertension    • Myocardial infarction (HCC)      Past Surgical History:   Procedure Laterality Date   • CARDIAC CATHETERIZATION  2007    TIA: proximal LAD   • CARDIAC CATHETERIZATION N/A 2022    Procedure: Left Heart Cath possible PCI, atherectomy, hemodynamic support;  Surgeon: John Perez MD;  Location:  INVASIVE LOCATION;  Service: Cardiovascular;  Laterality: N/A;   • COLONOSCOPY     • HERNIA REPAIR     • TOTAL HIP ARTHROPLASTY Left      Family History   Problem Relation Age of Onset   • Heart disease Father      Social History     Tobacco Use   • Smoking status: Never     Passive exposure: Never   • Smokeless tobacco: Never   Vaping Use   • Vaping Use: Never used   Substance Use Topics   • Alcohol use: No   • Drug use: No     (Not in a hospital admission)    Allergies:  Adhesive tape      Physical Exam  VITALS  REVIEWED    General:      well developed, in no acute distress.    Head:      normocephalic and atraumatic.    Eyes:      PERRL/EOM intact, conjunctiva and sclera clear with out nystagmus.    Neck:      no masses, thyromegaly,  trachea central with normal respiratory effort and PMI displaced laterally  Lungs:      Clear to auscultation bilaterally  Heart:       Regular rate and rhythm  Msk:      no deformity or scoliosis noted of thoracic or lumbar spine.    Pulses:      pulses normal in all 4 extremities.    Extremities:       No lower extremity edema  Neurologic:      no focal deficits.   alert oriented x3  Skin:      intact without lesions or rashes.    Psych:      alert and cooperative; normal mood and affect; normal attention span and concentration.      Result Review :               Pertinent cardiac workup    1.  Heart cath 7/9/2022 ejection fraction 55 to 60%, nonobstructive CAD, diffuse.  Patent LAD stent.  2.  EKG 7/8/2022 sinus rhythm      Procedures        Assessment and Plan      Donald Navarro is a 77-year-old male patient who has history of coronary artery disease status post PCI, heart catheterization in July 2022 showed no significant disease.  Patient was placed on Ranexa for chest pain but he could not tolerate it and therefore it was switched to Imdur 30 mg twice a day.  He is also on metoprolol along with aspirin Plavix and Crestor.  Patient continues to have chest pain which could be related to coronary spasms but also GI related.  I will go ahead and increase the amlodipine to 5 mg.  During this visit I also discussed with the patient about transferring his cardiac care over to Dr. Jerome who specializes in coronary disease, patient is agreeable.  We will make the 3-month follow-up with her.      Diagnoses and all orders for this visit:    1. Coronary artery disease involving native coronary artery of native heart without angina pectoris (Primary)    2. Primary hypertension    3. Pure  hypercholesterolemia    Other orders  -     amLODIPine (NORVASC) 2.5 MG tablet; Take 2 tablets by mouth Daily.  Dispense: 90 tablet; Refill: 3           No follow-ups on file.  Patient was given instructions and counseling regarding his condition or for health maintenance advice. Please see specific information pulled into the AVS if appropriate.

## 2023-01-06 RX ORDER — LISINOPRIL 5 MG/1
TABLET ORAL
Qty: 90 TABLET | Refills: 3 | Status: SHIPPED | OUTPATIENT
Start: 2023-01-06

## 2023-01-06 NOTE — TELEPHONE ENCOUNTER
Rx Refill Note  Requested Prescriptions     Pending Prescriptions Disp Refills   • metoprolol tartrate (LOPRESSOR) 25 MG tablet [Pharmacy Med Name: Metoprolol Tartrate 25 MG Oral Tablet] 45 tablet 0     Sig: Take 1/2 (one-half) tablet by mouth once daily   • lisinopril (PRINIVIL,ZESTRIL) 5 MG tablet [Pharmacy Med Name: Lisinopril 5 MG Oral Tablet] 90 tablet 0     Sig: Take 1 tablet by mouth once daily      Last office visit with prescribing clinician: 12/13/2022   Last telemedicine visit with prescribing clinician: 3/16/2023   Next office visit with prescribing clinician: 1/18/2023                         Would you like a call back once the refill request has been completed: [] Yes [] No    If the office needs to give you a call back, can they leave a voicemail: [] Yes [] No    Nicolasa Singh MA  01/06/23, 12:23 EST

## 2023-02-03 ENCOUNTER — TELEPHONE (OUTPATIENT)
Dept: CARDIOLOGY | Facility: CLINIC | Age: 78
End: 2023-02-03
Payer: MEDICARE

## 2023-02-03 NOTE — TELEPHONE ENCOUNTER
DR. LIDYA COHEN  COLONOSCOPY   SURGERY 5/8/23  PHONE [phone]  [phone]    PLACED ON DR. MILLS DESK.

## 2023-02-09 ENCOUNTER — OFFICE (OUTPATIENT)
Dept: URBAN - METROPOLITAN AREA CLINIC 64 | Facility: CLINIC | Age: 78
End: 2023-02-09

## 2023-02-09 VITALS
WEIGHT: 245 LBS | HEART RATE: 60 BPM | SYSTOLIC BLOOD PRESSURE: 122 MMHG | HEIGHT: 72 IN | DIASTOLIC BLOOD PRESSURE: 76 MMHG

## 2023-02-09 DIAGNOSIS — K21.9 GASTRO-ESOPHAGEAL REFLUX DISEASE WITHOUT ESOPHAGITIS: ICD-10-CM

## 2023-02-09 DIAGNOSIS — K59.00 CONSTIPATION, UNSPECIFIED: ICD-10-CM

## 2023-02-09 DIAGNOSIS — K44.9 DIAPHRAGMATIC HERNIA WITHOUT OBSTRUCTION OR GANGRENE: ICD-10-CM

## 2023-02-09 DIAGNOSIS — Z86.010 PERSONAL HISTORY OF COLONIC POLYPS: ICD-10-CM

## 2023-02-09 PROCEDURE — 99213 OFFICE O/P EST LOW 20 MIN: CPT | Performed by: NURSE PRACTITIONER

## 2023-03-17 RX ORDER — ISOSORBIDE MONONITRATE 30 MG/1
TABLET, EXTENDED RELEASE ORAL
Qty: 180 TABLET | Refills: 0 | Status: SHIPPED | OUTPATIENT
Start: 2023-03-17

## 2023-03-20 NOTE — PROGRESS NOTES
Encounter Date:03/21/2023      Patient ID: Donald Navarro is a 77 y.o. male.    No chief complaint on file.     Follow-up for chest pain    History of Present Illness  77-year-old with a history of CAD status post PCI to the LAD, hypertension, hyperlipidemia who presents for follow-up.  He has been having chronic chest pain which she describes as heart spasms ever since end of last year.  He said these would wake him up from sleep.  He would not have them during the day or with exertion.  Always occurred at rest.  He had an ER visit for this in December.  He was started on 2.5 mg of amlodipine at that time.  This helped with some of the episodes but he was still having them.  He recently was seen in the clinic a few months ago and amlodipine was increased to 5 mg.  He says since then his chest pain has completely resolved and he has not had no further episodes.  Denies any shortness of breath.  No orthopnea or PND.  No lower extremity edema.    I have reviewed his cardiac catheterization from 7/9/2022.  He has a previous stent in the LAD which is patent but it appears that there was a long lesion that was stented.  He has slow flow in the LAD.  He has also significant disease in his diagonals.  His circumflex has about 30% stenosis in the midsegment.  His RCA has about 30 to 40% stenosis.    His coronary artery disease has been medically managed.  He was previously on Ranexa but could not tolerate it so he was switched to Imdur.  He is also on metoprolol and amlodipine which was recently increased to 5 mg.  He also continues on Plavix and Crestor.    ECG in clinic today shows sinus bradycardia with a rate of 55 bpm and PACs.  Decreased rate compared to prior EKG.    The following portions of the patient's history were reviewed and updated as appropriate: allergies, current medications, past family history, past medical history, past social history, past surgical history, and problem list.    Review of Systems    Constitutional: Positive for malaise/fatigue.   Cardiovascular: Negative for chest pain, dyspnea on exertion, leg swelling and palpitations.   Respiratory: Negative for cough and shortness of breath.    Gastrointestinal: Negative for abdominal pain, nausea and vomiting.   Neurological: Negative for dizziness, focal weakness, headaches, light-headedness and numbness.   All other systems reviewed and are negative.        Current Outpatient Medications:   •  amLODIPine (NORVASC) 5 MG tablet, Take 1 tablet by mouth Daily., Disp: 90 tablet, Rfl: 3  •  aspirin 81 MG EC tablet, 1 tablet 2 (Two) Times a Day., Disp: , Rfl:   •  cetirizine (ZyrTEC Allergy) 10 MG tablet, Take 1 tablet by mouth Daily., Disp: 30 tablet, Rfl: 2  •  clopidogrel (PLAVIX) 75 MG tablet, Take 1 tablet by mouth Daily., Disp: 90 tablet, Rfl: 3  •  diphenhydrAMINE-acetaminophen (TYLENOL PM)  MG tablet per tablet, Take 2 tablets by mouth Every Night., Disp: , Rfl:   •  famotidine (PEPCID) 20 MG tablet, Take 1 tablet by mouth 2 (Two) Times a Day., Disp: , Rfl:   •  fluticasone (FLONASE) 50 MCG/ACT nasal spray, 2 sprays into the nostril(s) as directed by provider Daily As Needed for Rhinitis., Disp: , Rfl:   •  isosorbide mononitrate (IMDUR) 30 MG 24 hr tablet, TAKE 1 TABLET BY MOUTH EVERY 12 HOURS, Disp: 180 tablet, Rfl: 0  •  lisinopril (PRINIVIL,ZESTRIL) 5 MG tablet, Take 1 tablet by mouth once daily, Disp: 90 tablet, Rfl: 3  •  metoprolol tartrate (LOPRESSOR) 25 MG tablet, Take 1/2 (one-half) tablet by mouth once daily, Disp: 45 tablet, Rfl: 3  •  nitroglycerin (NITROSTAT) 0.4 MG SL tablet, 1 under the tongue as needed for angina, may repeat q5mins for up three doses, Disp: 100 tablet, Rfl: 11  •  pseudoephedrine (Sudafed) 30 MG tablet, Take 1 tablet by mouth Every 4 (Four) Hours As Needed for Congestion., Disp: 60 tablet, Rfl: 2  •  rosuvastatin (CRESTOR) 40 MG tablet, Take 1 tablet by mouth Every Night., Disp: 90 tablet, Rfl: 3    Allergies  "  Allergen Reactions   • Adhesive Tape Itching       Family History   Problem Relation Age of Onset   • Heart disease Father        Past Surgical History:   Procedure Laterality Date   • CARDIAC CATHETERIZATION  01/2007    TIA: proximal LAD   • CARDIAC CATHETERIZATION N/A 7/9/2022    Procedure: Left Heart Cath possible PCI, atherectomy, hemodynamic support;  Surgeon: John Perez MD;  Location: [facility] CATH INVASIVE LOCATION;  Service: Cardiovascular;  Laterality: N/A;   • COLONOSCOPY     • HERNIA REPAIR     • TOTAL HIP ARTHROPLASTY Left        Past Medical History:   Diagnosis Date   • CHF (congestive heart failure) (HCC)    • Coronary artery disease    • GERD (gastroesophageal reflux disease)    • Hyperlipidemia    • Hypertension    • Myocardial infarction (HCC)        Social History     Socioeconomic History   • Marital status:    Tobacco Use   • Smoking status: Never     Passive exposure: Never   • Smokeless tobacco: Never   Vaping Use   • Vaping Use: Never used   Substance and Sexual Activity   • Alcohol use: No   • Drug use: No   • Sexual activity: Defer         Procedures      Objective:       Physical Exam    /71 (BP Location: Left arm, Patient Position: Sitting)   Pulse 55   Ht 182.9 cm (72\")   Wt 108 kg (238 lb)   SpO2 97%   BMI 32.28 kg/m²   The patient is alert, oriented and in no distress.    Vital signs as noted above.    Head and neck revealed no carotid bruits or jugular venous distension.  No thyromegaly or lymphadenopathy is present.    Lungs clear.  No wheezing.  Breath sounds are normal bilaterally.    Heart normal first and second heart sounds.  No murmur..  No pericardial rub is present.  No gallop is present.    Abdomen soft and nontender.  No organomegaly is present.    Extremities revealed good peripheral pulses without any pedal edema.    Skin warm and dry.    Musculoskeletal system is grossly normal.    CNS grossly normal.           Diagnosis Plan   1. Coronary artery " disease involving native coronary artery of native heart without angina pectoris        LAB RESULTS (LAST 7 DAYS)    CBC        BMP        CMP         BNP        TROPONIN        CoAg        Creatinine Clearance  CrCl cannot be calculated (Patient's most recent lab result is older than the maximum 30 days allowed.).    ABG        Radiology  No radiology results for the last day         Assessment and Plan       Diagnoses and all orders for this visit:    1. Coronary artery disease involving native coronary artery of native heart without angina pectoris    Other orders  -     amLODIPine (NORVASC) 5 MG tablet; Take 1 tablet by mouth Daily.  Dispense: 90 tablet; Refill: 3       Coronary artery disease  Previous PCI to the LAD with slow flow in the LAD  Continue medical management with amlodipine and Imdur 30 mg along with metoprolol 25 mg  Currently on aspirin and Plavix  No bleeding issues  Explained to him that if he has further episodes of chest pain will consider stress test and further evaluation    Hypertension  Well-controlled on current regimen    Hyperlipidemia  On rosuvastatin 40 mg  Last LDL was 70 and at goal    45 minutes spent in patient care on this date of service including but not limited to preparing to see the patient, obtaining and/or reviewing history, performing medically appropriate examination, ordering tests and procedures, independently interpreting results and documenting information in EHR and counseling and education of patient and family.  It this excludes time spent on other separate services such as performing procedures or test interpretation if applicable          Edwige Jerome MD

## 2023-03-21 ENCOUNTER — OFFICE VISIT (OUTPATIENT)
Dept: CARDIOLOGY | Facility: CLINIC | Age: 78
End: 2023-03-21
Payer: MEDICARE

## 2023-03-21 VITALS
OXYGEN SATURATION: 97 % | BODY MASS INDEX: 32.23 KG/M2 | HEIGHT: 72 IN | WEIGHT: 238 LBS | HEART RATE: 55 BPM | DIASTOLIC BLOOD PRESSURE: 71 MMHG | SYSTOLIC BLOOD PRESSURE: 131 MMHG

## 2023-03-21 DIAGNOSIS — I25.10 CORONARY ARTERY DISEASE INVOLVING NATIVE CORONARY ARTERY OF NATIVE HEART WITHOUT ANGINA PECTORIS: Chronic | ICD-10-CM

## 2023-03-21 PROCEDURE — 3075F SYST BP GE 130 - 139MM HG: CPT | Performed by: INTERNAL MEDICINE

## 2023-03-21 PROCEDURE — 99215 OFFICE O/P EST HI 40 MIN: CPT | Performed by: INTERNAL MEDICINE

## 2023-03-21 PROCEDURE — 1160F RVW MEDS BY RX/DR IN RCRD: CPT | Performed by: INTERNAL MEDICINE

## 2023-03-21 PROCEDURE — 3078F DIAST BP <80 MM HG: CPT | Performed by: INTERNAL MEDICINE

## 2023-03-21 PROCEDURE — 1159F MED LIST DOCD IN RCRD: CPT | Performed by: INTERNAL MEDICINE

## 2023-03-21 RX ORDER — AMLODIPINE BESYLATE 5 MG/1
5 TABLET ORAL DAILY
Qty: 90 TABLET | Refills: 3 | Status: SHIPPED | OUTPATIENT
Start: 2023-03-21

## 2023-04-14 RX ORDER — ROSUVASTATIN CALCIUM 40 MG/1
TABLET, COATED ORAL
Qty: 90 TABLET | Refills: 0 | Status: SHIPPED | OUTPATIENT
Start: 2023-04-14

## 2023-05-05 RX ORDER — CLOPIDOGREL BISULFATE 75 MG/1
TABLET ORAL
Qty: 90 TABLET | Refills: 0 | Status: SHIPPED | OUTPATIENT
Start: 2023-05-05

## 2023-05-05 NOTE — TELEPHONE ENCOUNTER
Rx Refill Note  Requested Prescriptions     Pending Prescriptions Disp Refills   • clopidogrel (PLAVIX) 75 MG tablet [Pharmacy Med Name: Clopidogrel Bisulfate 75 MG Oral Tablet] 90 tablet 0     Sig: Take 1 tablet by mouth once daily      Last office visit with prescribing clinician: 12/13/2022   Last telemedicine visit with prescribing clinician: Visit date not found   Next office visit with prescribing clinician: Visit date not found                         Would you like a call back once the refill request has been completed: [] Yes [] No    If the office needs to give you a call back, can they leave a voicemail: [] Yes [] No    Elena Davies MA  05/05/23, 09:16 EDT

## 2023-06-27 ENCOUNTER — ON CAMPUS - OUTPATIENT (OUTPATIENT)
Dept: URBAN - METROPOLITAN AREA HOSPITAL 85 | Facility: HOSPITAL | Age: 78
End: 2023-06-27

## 2023-06-27 DIAGNOSIS — D12.5 BENIGN NEOPLASM OF SIGMOID COLON: ICD-10-CM

## 2023-06-27 DIAGNOSIS — K57.30 DIVERTICULOSIS OF LARGE INTESTINE WITHOUT PERFORATION OR ABS: ICD-10-CM

## 2023-06-27 DIAGNOSIS — K63.9 DISEASE OF INTESTINE, UNSPECIFIED: ICD-10-CM

## 2023-06-27 DIAGNOSIS — D12.4 BENIGN NEOPLASM OF DESCENDING COLON: ICD-10-CM

## 2023-06-27 DIAGNOSIS — D12.2 BENIGN NEOPLASM OF ASCENDING COLON: ICD-10-CM

## 2023-06-27 DIAGNOSIS — K64.0 FIRST DEGREE HEMORRHOIDS: ICD-10-CM

## 2023-06-27 DIAGNOSIS — D12.8 BENIGN NEOPLASM OF RECTUM: ICD-10-CM

## 2023-06-27 DIAGNOSIS — Z86.010 PERSONAL HISTORY OF COLONIC POLYPS: ICD-10-CM

## 2023-06-27 PROCEDURE — 45380 COLONOSCOPY AND BIOPSY: CPT | Mod: 33,59 | Performed by: INTERNAL MEDICINE

## 2023-06-27 PROCEDURE — 45385 COLONOSCOPY W/LESION REMOVAL: CPT | Mod: 33 | Performed by: INTERNAL MEDICINE

## 2023-06-27 PROCEDURE — 45380 COLONOSCOPY AND BIOPSY: CPT | Mod: 59,33 | Performed by: INTERNAL MEDICINE

## 2023-07-06 ENCOUNTER — TELEHEALTH PROVIDED IN PATIENT'S HOME (OUTPATIENT)
Dept: URBAN - METROPOLITAN AREA TELEHEALTH 4 | Facility: TELEHEALTH | Age: 78
End: 2023-07-06

## 2023-07-06 VITALS — HEIGHT: 72 IN

## 2023-07-06 DIAGNOSIS — Z86.010 PERSONAL HISTORY OF COLONIC POLYPS: ICD-10-CM

## 2023-07-06 DIAGNOSIS — D86.9 SARCOIDOSIS, UNSPECIFIED: ICD-10-CM

## 2023-07-06 PROCEDURE — 99213 OFFICE O/P EST LOW 20 MIN: CPT | Performed by: INTERNAL MEDICINE

## 2023-08-07 RX ORDER — CLOPIDOGREL BISULFATE 75 MG/1
TABLET ORAL
Qty: 90 TABLET | Refills: 1 | Status: SHIPPED | OUTPATIENT
Start: 2023-08-07

## 2023-08-07 NOTE — TELEPHONE ENCOUNTER
Rx Refill Note  Requested Prescriptions     Pending Prescriptions Disp Refills    clopidogrel (PLAVIX) 75 MG tablet [Pharmacy Med Name: Clopidogrel Bisulfate 75 MG Oral Tablet] 90 tablet 1     Sig: Take 1 tablet by mouth once daily      Last office visit with prescribing clinician: 12/13/2022   Last telemedicine visit with prescribing clinician: Visit date not found   Next office visit with prescribing clinician: Visit date not found                         Would you like a call back once the refill request has been completed: [] Yes [] No    If the office needs to give you a call back, can they leave a voicemail: [] Yes [] No    Angela Bravo MA  08/07/23, 08:48 EDT

## 2023-09-05 RX ORDER — ISOSORBIDE MONONITRATE 30 MG/1
TABLET, EXTENDED RELEASE ORAL
Qty: 180 TABLET | Refills: 3 | Status: SHIPPED | OUTPATIENT
Start: 2023-09-05

## 2023-09-05 NOTE — TELEPHONE ENCOUNTER
Rx Refill Note  Requested Prescriptions     Pending Prescriptions Disp Refills    isosorbide mononitrate (IMDUR) 30 MG 24 hr tablet [Pharmacy Med Name: Isosorbide Mononitrate ER 30 MG Oral Tablet Extended Release 24 Hour] 180 tablet 0     Sig: TAKE 1 TABLET BY MOUTH EVERY 12 HOURS      Last office visit with prescribing clinician: 12/13/2022   Last telemedicine visit with prescribing clinician: Visit date not found   Next office visit with prescribing clinician: Visit date not found                             Cassandra Yoon MA  09/05/23, 09:26 EDT

## 2023-09-18 NOTE — PROGRESS NOTES
Encounter Date:03/21/2023      Patient ID: Donald Navarro is a 78 y.o. male.    Chief Complaint   Patient presents with    Follow-up     6 MONTH f/u    Coronary Artery Disease          History of Present Illness  77-year-old with a history of CAD status post PCI to the LAD, hypertension, hyperlipidemia who presents for follow-up.    He has been doing really well from a cardiac perspective.  Denies any episodes of chest pain.  Remains very active.  Was cutting wood this morning without any difficulty.  He did have a colonoscopy this July and was found to have lymphoma for which he is following with oncology.  He recently had a PET scan and has follow-up this Friday.    EKG in clinic today shows sinus bradycardia with a rate of 58 bpm and no acute ST-T changes.  No change compared to prior EKG    I have reviewed his cardiac catheterization from 7/9/2022.  He has a previous stent in the LAD which is patent but it appears that there was a long lesion that was stented.  He has slow flow in the LAD.  He has also significant disease in his diagonals.  His circumflex has about 30% stenosis in the midsegment.  His RCA has about 30 to 40% stenosis.    The following portions of the patient's history were reviewed and updated as appropriate: allergies, current medications, past family history, past medical history, past social history, past surgical history, and problem list.    Review of Systems   Constitutional: Negative for malaise/fatigue.   Eyes:  Negative for redness.   Cardiovascular:  Negative for chest pain, dyspnea on exertion, leg swelling and palpitations.   Respiratory:  Positive for shortness of breath. Negative for cough.    Gastrointestinal:  Negative for abdominal pain, nausea and vomiting.   Neurological:  Negative for dizziness, focal weakness, headaches, light-headedness and numbness.   All other systems reviewed and are negative.      Current Outpatient Medications:     amLODIPine (NORVASC) 5 MG tablet,  Take 1 tablet by mouth Daily., Disp: 90 tablet, Rfl: 2    aspirin 81 MG EC tablet, 1 tablet Daily., Disp: , Rfl:     clopidogrel (PLAVIX) 75 MG tablet, Take 1 tablet by mouth once daily, Disp: 90 tablet, Rfl: 1    diphenhydrAMINE-acetaminophen (TYLENOL PM)  MG tablet per tablet, Take 2 tablets by mouth Every Night., Disp: , Rfl:     famotidine (PEPCID) 20 MG tablet, Take 1 tablet by mouth 2 (Two) Times a Day., Disp: , Rfl:     fluticasone (FLONASE) 50 MCG/ACT nasal spray, 2 sprays into the nostril(s) as directed by provider Daily As Needed for Rhinitis., Disp: , Rfl:     isosorbide mononitrate (IMDUR) 30 MG 24 hr tablet, TAKE 1 TABLET BY MOUTH EVERY 12 HOURS, Disp: 180 tablet, Rfl: 3    lisinopril (PRINIVIL,ZESTRIL) 5 MG tablet, Take 1 tablet by mouth once daily, Disp: 90 tablet, Rfl: 3    metoprolol tartrate (LOPRESSOR) 25 MG tablet, Take 1/2 (one-half) tablet by mouth once daily, Disp: 45 tablet, Rfl: 3    nitroglycerin (NITROSTAT) 0.4 MG SL tablet, 1 under the tongue as needed for angina, may repeat q5mins for up three doses, Disp: 100 tablet, Rfl: 11    rosuvastatin (CRESTOR) 40 MG tablet, TAKE 1 TABLET BY MOUTH ONCE DAILY AT NIGHT, Disp: 90 tablet, Rfl: 0    Allergies   Allergen Reactions    Adhesive Tape Itching       Family History   Problem Relation Age of Onset    Heart disease Father        Past Surgical History:   Procedure Laterality Date    CARDIAC CATHETERIZATION  01/2007    TIA: proximal LAD    CARDIAC CATHETERIZATION N/A 07/09/2022    Procedure: Left Heart Cath possible PCI, atherectomy, hemodynamic support;  Surgeon: John Perez MD;  Location: [facility] CATH INVASIVE LOCATION;  Service: Cardiovascular;  Laterality: N/A;    COLONOSCOPY  07/2023    COLONOSCOPY N/A 06/27/2023    Procedure: COLONOSCOPY with terminal ileum biopsy's and cold snare polypectomy x 10;  Surgeon: Neymar Watson MD;  Location: [facility] ENDOSCOPY;  Service: Gastroenterology;  Laterality: N/A;  diverticulosis, internal  "hemorrhoids    HERNIA REPAIR      TOTAL HIP ARTHROPLASTY Left        Past Medical History:   Diagnosis Date    CHF (congestive heart failure)     Coronary artery disease     GERD (gastroesophageal reflux disease)     Hyperlipidemia     Hypertension     Myocardial infarction        Social History     Socioeconomic History    Marital status:    Tobacco Use    Smoking status: Never     Passive exposure: Never    Smokeless tobacco: Never   Vaping Use    Vaping Use: Never used   Substance and Sexual Activity    Alcohol use: No    Drug use: No    Sexual activity: Defer         Procedures      Objective:       Physical Exam    /75 (BP Location: Left arm, Patient Position: Sitting)   Pulse 58   Ht 182.9 cm (72\")   Wt 108 kg (239 lb)   SpO2 97%   BMI 32.41 kg/m²   The patient is alert, oriented and in no distress.    Vital signs as noted above.    Head and neck revealed no carotid bruits or jugular venous distension.  No thyromegaly or lymphadenopathy is present.    Lungs clear.  No wheezing.  Breath sounds are normal bilaterally.    Heart normal first and second heart sounds.  No murmur..  No pericardial rub is present.  No gallop is present.    Abdomen soft and nontender.  No organomegaly is present.    Extremities revealed good peripheral pulses without any pedal edema.    Skin warm and dry.    Musculoskeletal system is grossly normal.    CNS grossly normal.           Diagnosis Plan   1. Coronary artery disease involving native coronary artery of native heart without angina pectoris [I25.10 (ICD-10-CM)]        2. Pure hypercholesterolemia        3. Primary hypertension        4. Lymphoma, unspecified body region, unspecified lymphoma type        LAB RESULTS (LAST 7 DAYS)    CBC        BMP        CMP         BNP        TROPONIN        CoAg        Creatinine Clearance  CrCl cannot be calculated (Patient's most recent lab result is older than the maximum 30 days allowed.).    ABG        Radiology  No " radiology results for the last day         Assessment and Plan       Diagnoses and all orders for this visit:    1. Coronary artery disease involving native coronary artery of native heart without angina pectoris [I25.10 (ICD-10-CM)] (Primary)    2. Pure hypercholesterolemia    3. Primary hypertension    4. Lymphoma, unspecified body region, unspecified lymphoma type       Coronary artery disease  Previous PCI to the LAD with slow flow in the LAD  Continue medical management with amlodipine and Imdur 30 mg along with metoprolol 25 mg  Currently on aspirin and Plavix  No bleeding issues  No further episodes of chest pain    Hypertension  Well-controlled on current regimen    Hyperlipidemia  On rosuvastatin 40 mg  Last LDL was 70 and at goal    Recently diagnosed lymphoma  Being followed by oncology            Edwige Jerome MD

## 2023-09-19 ENCOUNTER — OFFICE VISIT (OUTPATIENT)
Dept: CARDIOLOGY | Facility: CLINIC | Age: 78
End: 2023-09-19
Payer: MEDICARE

## 2023-09-19 VITALS
HEIGHT: 72 IN | BODY MASS INDEX: 32.37 KG/M2 | SYSTOLIC BLOOD PRESSURE: 133 MMHG | OXYGEN SATURATION: 97 % | HEART RATE: 58 BPM | WEIGHT: 239 LBS | DIASTOLIC BLOOD PRESSURE: 75 MMHG

## 2023-09-19 DIAGNOSIS — C85.90 LYMPHOMA, UNSPECIFIED BODY REGION, UNSPECIFIED LYMPHOMA TYPE: ICD-10-CM

## 2023-09-19 DIAGNOSIS — E78.00 PURE HYPERCHOLESTEROLEMIA: Chronic | ICD-10-CM

## 2023-09-19 DIAGNOSIS — I25.10 CORONARY ARTERY DISEASE INVOLVING NATIVE CORONARY ARTERY OF NATIVE HEART WITHOUT ANGINA PECTORIS: Primary | Chronic | ICD-10-CM

## 2023-09-19 DIAGNOSIS — I10 PRIMARY HYPERTENSION: Chronic | ICD-10-CM

## 2023-10-09 RX ORDER — ROSUVASTATIN CALCIUM 40 MG/1
TABLET, COATED ORAL
Qty: 90 TABLET | Refills: 3 | Status: SHIPPED | OUTPATIENT
Start: 2023-10-09

## 2023-10-09 NOTE — TELEPHONE ENCOUNTER
Rx Refill Note  Requested Prescriptions     Signed Prescriptions Disp Refills    rosuvastatin (CRESTOR) 40 MG tablet 90 tablet 3     Sig: TAKE 1 TABLET BY MOUTH ONCE DAILY AT NIGHT     Authorizing Provider: DAVIDE RECIO     Ordering User: CRISTHIAN CONTRERAS      Last office visit with prescribing clinician: 09/19/2023-   Last telemedicine visit with prescribing clinician: Visit date not found   Next office visit with prescribing clinician:  unable to see future scheduled appts for [place] pts.                         Labs done on 12/9/2022 at outside facility and scanned in to the chart:: SCANNED - LABS (12/09/2022)     Cristhian Contreras MA  10/09/23, 10:25 EDT

## 2023-10-12 ENCOUNTER — HOSPITAL ENCOUNTER (OUTPATIENT)
Dept: CT IMAGING | Facility: HOSPITAL | Age: 78
Discharge: HOME | End: 2023-10-12
Admitting: RADIOLOGY
Payer: MEDICARE

## 2023-10-12 VITALS
DIASTOLIC BLOOD PRESSURE: 74 MMHG | WEIGHT: 239 LBS | SYSTOLIC BLOOD PRESSURE: 117 MMHG | OXYGEN SATURATION: 99 % | HEART RATE: 49 BPM | RESPIRATION RATE: 8 BRPM | TEMPERATURE: 98 F | BODY MASS INDEX: 32.37 KG/M2 | HEIGHT: 72 IN

## 2023-10-12 DIAGNOSIS — C85.90 NON-HODGKIN'S LYMPHOMA, UNSPECIFIED BODY REGION, UNSPECIFIED NON-HODGKIN LYMPHOMA TYPE: ICD-10-CM

## 2023-10-12 LAB
APTT PPP: 28.3 SECONDS (ref 24–31)
BASOPHILS # BLD AUTO: 0.1 10*3/MM3 (ref 0–0.2)
BASOPHILS NFR BLD AUTO: 1.8 % (ref 0–1.5)
DEPRECATED RDW RBC AUTO: 49 FL (ref 37–54)
EOSINOPHIL # BLD AUTO: 0.4 10*3/MM3 (ref 0–0.4)
EOSINOPHIL NFR BLD AUTO: 7.9 % (ref 0.3–6.2)
ERYTHROCYTE [DISTWIDTH] IN BLOOD BY AUTOMATED COUNT: 14.9 % (ref 12.3–15.4)
HCT VFR BLD AUTO: 46.6 % (ref 37.5–51)
HGB BLD-MCNC: 15.3 G/DL (ref 13–17.7)
INR PPP: 1.15 (ref 0.93–1.1)
LYMPHOCYTES # BLD AUTO: 0.9 10*3/MM3 (ref 0.7–3.1)
LYMPHOCYTES NFR BLD AUTO: 16.8 % (ref 19.6–45.3)
MCH RBC QN AUTO: 29.5 PG (ref 26.6–33)
MCHC RBC AUTO-ENTMCNC: 32.8 G/DL (ref 31.5–35.7)
MCV RBC AUTO: 89.9 FL (ref 79–97)
MONOCYTES # BLD AUTO: 0.9 10*3/MM3 (ref 0.1–0.9)
MONOCYTES NFR BLD AUTO: 17.5 % (ref 5–12)
NEUTROPHILS NFR BLD AUTO: 3 10*3/MM3 (ref 1.7–7)
NEUTROPHILS NFR BLD AUTO: 56 % (ref 42.7–76)
NRBC BLD AUTO-RTO: 0.1 /100 WBC (ref 0–0.2)
PLATELET # BLD AUTO: 139 10*3/MM3 (ref 140–450)
PMV BLD AUTO: 9.4 FL (ref 6–12)
PROTHROMBIN TIME: 12.4 SECONDS (ref 9.6–11.7)
RBC # BLD AUTO: 5.18 10*6/MM3 (ref 4.14–5.8)
WBC NRBC COR # BLD: 5.3 10*3/MM3 (ref 3.4–10.8)

## 2023-10-12 PROCEDURE — 85730 THROMBOPLASTIN TIME PARTIAL: CPT | Performed by: RADIOLOGY

## 2023-10-12 PROCEDURE — 25010000002 MIDAZOLAM PER 1 MG: Performed by: RADIOLOGY

## 2023-10-12 PROCEDURE — 85610 PROTHROMBIN TIME: CPT | Performed by: RADIOLOGY

## 2023-10-12 PROCEDURE — 77012 CT SCAN FOR NEEDLE BIOPSY: CPT

## 2023-10-12 PROCEDURE — 85025 COMPLETE CBC W/AUTO DIFF WBC: CPT | Performed by: RADIOLOGY

## 2023-10-12 PROCEDURE — 99152 MOD SED SAME PHYS/QHP 5/>YRS: CPT

## 2023-10-12 PROCEDURE — 25010000002 ONDANSETRON PER 1 MG: Performed by: RADIOLOGY

## 2023-10-12 PROCEDURE — 25810000003 SODIUM CHLORIDE 0.9 % SOLUTION: Performed by: RADIOLOGY

## 2023-10-12 PROCEDURE — 25010000002 FENTANYL CITRATE (PF) 50 MCG/ML SOLUTION: Performed by: RADIOLOGY

## 2023-10-12 PROCEDURE — 25010000002 LIDOCAINE 1 % SOLUTION: Performed by: RADIOLOGY

## 2023-10-12 RX ORDER — SODIUM CHLORIDE 0.9 % (FLUSH) 0.9 %
10 SYRINGE (ML) INJECTION EVERY 12 HOURS SCHEDULED
Status: DISCONTINUED | OUTPATIENT
Start: 2023-10-12 | End: 2023-10-13 | Stop reason: HOSPADM

## 2023-10-12 RX ORDER — ONDANSETRON 2 MG/ML
INJECTION INTRAMUSCULAR; INTRAVENOUS AS NEEDED
Status: COMPLETED | OUTPATIENT
Start: 2023-10-12 | End: 2023-10-12

## 2023-10-12 RX ORDER — HYDROCODONE BITARTRATE AND ACETAMINOPHEN 5; 325 MG/1; MG/1
2 TABLET ORAL EVERY 6 HOURS PRN
Status: DISCONTINUED | OUTPATIENT
Start: 2023-10-12 | End: 2023-10-13 | Stop reason: HOSPADM

## 2023-10-12 RX ORDER — FENTANYL CITRATE 50 UG/ML
INJECTION, SOLUTION INTRAMUSCULAR; INTRAVENOUS AS NEEDED
Status: COMPLETED | OUTPATIENT
Start: 2023-10-12 | End: 2023-10-12

## 2023-10-12 RX ORDER — LIDOCAINE HYDROCHLORIDE 10 MG/ML
INJECTION, SOLUTION INFILTRATION; PERINEURAL AS NEEDED
Status: COMPLETED | OUTPATIENT
Start: 2023-10-12 | End: 2023-10-12

## 2023-10-12 RX ORDER — SODIUM CHLORIDE 0.9 % (FLUSH) 0.9 %
10 SYRINGE (ML) INJECTION AS NEEDED
Status: DISCONTINUED | OUTPATIENT
Start: 2023-10-12 | End: 2023-10-13 | Stop reason: HOSPADM

## 2023-10-12 RX ORDER — MIDAZOLAM HYDROCHLORIDE 1 MG/ML
INJECTION INTRAMUSCULAR; INTRAVENOUS AS NEEDED
Status: COMPLETED | OUTPATIENT
Start: 2023-10-12 | End: 2023-10-12

## 2023-10-12 RX ORDER — SODIUM CHLORIDE 9 MG/ML
75 INJECTION, SOLUTION INTRAVENOUS CONTINUOUS
Status: DISCONTINUED | OUTPATIENT
Start: 2023-10-12 | End: 2023-10-13 | Stop reason: HOSPADM

## 2023-10-12 RX ADMIN — Medication 10 ML: at 08:19

## 2023-10-12 RX ADMIN — LIDOCAINE HYDROCHLORIDE 8 ML: 10 INJECTION, SOLUTION INFILTRATION; PERINEURAL at 09:43

## 2023-10-12 RX ADMIN — FENTANYL CITRATE 100 MCG: 50 INJECTION, SOLUTION INTRAMUSCULAR; INTRAVENOUS at 09:34

## 2023-10-12 RX ADMIN — FENTANYL CITRATE 50 MCG: 50 INJECTION, SOLUTION INTRAMUSCULAR; INTRAVENOUS at 09:41

## 2023-10-12 RX ADMIN — SODIUM CHLORIDE 75 ML/HR: 9 INJECTION, SOLUTION INTRAVENOUS at 08:19

## 2023-10-12 RX ADMIN — MIDAZOLAM 1 MG: 1 INJECTION INTRAMUSCULAR; INTRAVENOUS at 09:34

## 2023-10-12 RX ADMIN — ONDANSETRON 4 MG: 2 INJECTION INTRAMUSCULAR; INTRAVENOUS at 09:28

## 2023-10-12 NOTE — NURSING NOTE
Pt discharged per wheelchair to s/o  Pt placed in passenger side of vehicle. Verbal and written understanding of discharge instructions received

## 2023-10-12 NOTE — H&P
[facility]   Interventional Radiology H&P    Patient Name: Donald Navarro  : 1945  MRN: [id number]  Primary Care Physician:  Rama Quintanilla APRN  Referring Physician: Otto Booth MD  Date of admission: 10/12/2023    Subjective   Subjective     HPI:  Donald Navarro is a 78 y.o. male with NHL, bone marrow biopsy requested.     Review of Systems:   Constitutional no fever,  no weight loss       Otolaryngeal no difficulty swallowing   Cardiovascular no chest pain   Pulmonary no cough, no sputum production   Gastrointestinal no constipation, no diarrhea                         Personal History       Past Medical/Surgical History:   Past Medical History:   Diagnosis Date    CHF (congestive heart failure)     Coronary artery disease     GERD (gastroesophageal reflux disease)     Hyperlipidemia     Hypertension     Myocardial infarction      Past Surgical History:   Procedure Laterality Date    CARDIAC CATHETERIZATION  2007    TIA: proximal LAD    CARDIAC CATHETERIZATION N/A 2022    Procedure: Left Heart Cath possible PCI, atherectomy, hemodynamic support;  Surgeon: John Perez MD;  Location: [facility] CATH INVASIVE LOCATION;  Service: Cardiovascular;  Laterality: N/A;    COLONOSCOPY  2023    COLONOSCOPY N/A 2023    Procedure: COLONOSCOPY with terminal ileum biopsy's and cold snare polypectomy x 10;  Surgeon: Neymar Watson MD;  Location: [facility] ENDOSCOPY;  Service: Gastroenterology;  Laterality: N/A;  diverticulosis, internal hemorrhoids    HERNIA REPAIR      TOTAL HIP ARTHROPLASTY Left        Social History:  reports that he has never smoked. He has never been exposed to tobacco smoke. He has never used smokeless tobacco. He reports that he does not drink alcohol and does not use drugs.    Medications:  (Not in a hospital admission)    Current medications:  sodium chloride, 10 mL, Intravenous, Q12H      Current IV drips:  sodium chloride, 75 mL/hr, Last Rate: 75 mL/hr (10/12/23  "0725)        Allergies:  Allergies   Allergen Reactions    Adhesive Tape Itching       Objective    Objective     Vitals:   Temp:  [98 øF (36.7 øC)] 98 øF (36.7 øC)  Heart Rate:  [51] 51  Resp:  [17] 17  BP: (123)/(84) 123/84      Physical Exam:   Constitutional: Awake, alert, No acute distress    Respiratory: Clear to auscultation bilaterally, nonlabored respirations    Cardiovascular: RRR, no murmurs, rubs, or gallops, palpable pedal pulses bilaterally   Gastrointestinal: Positive bowel sounds, soft, nontender, nondistended        ASA SCALE ASSESSMENT:       MALLAMPATI CLASSIFICATION:          Result Review        Result Review:     No results found for: \"NA\"    No results found for: \"K\"    No results found for: \"CL\"    No results found for: \"PLASMABICARB\"    No results found for: \"BUN\"    No results found for: \"CREATININE\"    No results found for: \"CALCIUM\"        No components found for: \"GLUCOSE.*\"  Results from last 7 days   Lab Units 10/12/23  0803   WBC 10*3/mm3 5.30   HEMOGLOBIN g/dL 15.3   HEMATOCRIT % 46.6   PLATELETS 10*3/mm3 139*      Results from last 7 days   Lab Units 10/12/23  0803   INR  1.15*           Assessment / Plan     Assesment:   NHL      Plan:   CT guided bone marrow biopsy    The risks and benefits of the procedure were discussed with the patient.    Electronically signed by Yoseph Bernardo MD, 10/12/23, 9:23 AM EDT. with  "

## 2023-10-12 NOTE — POST-PROCEDURE NOTE
IR POST OP NOTE      Procedure:CT guided bone marrow biopsy      Pre Op DX:NHL      Post Op DX:same      Anesthesia: Local, CS      Findings:See dictation      Complications:None immediate.       Provider Signature: Dr. Yoseph Bernardo

## 2023-10-13 LAB — Lab: NORMAL

## 2023-10-23 LAB
LAB AP CASE REPORT: NORMAL
LAB AP FLOW CYTOMETRY SUMMARY: NORMAL
PATH REPORT.FINAL DX SPEC: NORMAL
PATH REPORT.GROSS SPEC: NORMAL

## 2023-10-27 LAB — REF LAB TEST METHOD: NORMAL

## 2023-12-28 RX ORDER — LISINOPRIL 5 MG/1
5 TABLET ORAL DAILY
Qty: 90 TABLET | Refills: 0 | Status: SHIPPED | OUTPATIENT
Start: 2023-12-28

## 2023-12-28 NOTE — TELEPHONE ENCOUNTER
Rx Refill Note  Requested Prescriptions     Pending Prescriptions Disp Refills    lisinopril (PRINIVIL,ZESTRIL) 5 MG tablet [Pharmacy Med Name: Lisinopril 5 MG Oral Tablet] 90 tablet 0     Sig: Take 1 tablet by mouth Daily.      Last office visit with prescribing clinician: 09/19/2023  Last telemedicine visit with prescribing clinician: Visit date not found   Next office visit with prescribing clinician: Unable to see future appointments for Hand. Please keep your follow up appointment.                            Kylah Rose MA  12/28/23, 12:39 EST

## 2024-01-12 NOTE — PROGRESS NOTES
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     History of Present Illness:  Patient is a 78 y.o.     2024: In the office completely asymptomatic to transfer his care from [facility]. He first was seen in late 2023 for a screening colonoscopy. Linear furrows throughout the terminal ileum were felt to be abnormal. He also had colonic polyps, diverticulosis and hemorrhoids in the colon. The biopsies confirmed tubular adenomas and hyperplastic polyps. In addition there was an atypical mixed B cell infiltrate. B cell re-arrangement was performed. Monoclonal B cells were confirmed. This was reported to be consistent with extranodal marginal zone lymphoma. He was entirely asymptomatic. Imaging reported no abnormalities in the abdomen. In the mediastinum there were enlarged mediastinal and hilar lymph nodes, which were partially calcified, similar to a previous scan; this included a 2.5 X 1.6 cm right paratracheal node and a 1.9 X 1.3 cm right infrahilar node. In early  he had undergone a biopsy that had reported non-caseating granulomata that was consistent with sarcoidosis. The PET scan reported bilateral hilar and mediastinal adenopathy that were fluorodeoxyglucose avid. Otherwise there were no areas of abnormal tracer accumulation. Bone marrow biopsy was negative. A decision was made then to treat him with rituximab. He received the first of 4 doses on 10/28/2023 and completed a fourth dose on 11/3/2023. He had fever of up to 101.2 after the rituximab injections. A follow up PET scan only a few days later, showed no change. Today he tells me he remains perfectly asymptomatic. He has maintained a stable weight and has been afebrile. He has been without  nocturnal diaphoresis. He has maintained a good appetite and is generally energetic. He has been eating well and denies nausea or vomiting. Has not had any chest pain or cough and has not had diarrhea or dysuria. No edema. On exam no palpable adenopathy. The laboratory exams were reviewed. I'm not sure he needed any specific treatment and at this time he seems to be doing well and is likely to continue to do well without intervention. He is to have scans and laboratory exams and will see me with new scans.     Subjective:  1/16/2024: In the office for the first time with the above.     Past Medical History:   Diagnosis Date    CHF (congestive heart failure)     Coronary artery disease     GERD (gastroesophageal reflux disease)     Hyperlipidemia     Hypertension     Myocardial infarction      Past Surgical History:   Procedure Laterality Date    CARDIAC CATHETERIZATION  01/2007    TIA: proximal LAD    CARDIAC CATHETERIZATION N/A 07/09/2022    Procedure: Left Heart Cath possible PCI, atherectomy, hemodynamic support;  Surgeon: John Perez MD;  Location: [facility] CATH INVASIVE LOCATION;  Service: Cardiovascular;  Laterality: N/A;    COLONOSCOPY  07/2023    COLONOSCOPY N/A 06/27/2023    Procedure: COLONOSCOPY with terminal ileum biopsy's and cold snare polypectomy x 10;  Surgeon: Neymar Watson MD;  Location: [facility] ENDOSCOPY;  Service: Gastroenterology;  Laterality: N/A;  diverticulosis, internal hemorrhoids    HERNIA REPAIR      TOTAL HIP ARTHROPLASTY Left        Current Outpatient Medications:     amLODIPine (NORVASC) 5 MG tablet, Take 1 tablet by mouth Daily., Disp: 90 tablet, Rfl: 2    aspirin 81 MG EC tablet, 1 tablet Daily., Disp: , Rfl:     clopidogrel (PLAVIX) 75 MG tablet, Take 1 tablet by mouth once daily, Disp: 90 tablet, Rfl: 1    diphenhydrAMINE-acetaminophen (TYLENOL PM)  MG tablet per tablet, Take 2 tablets by mouth Every Night., Disp: , Rfl:     famotidine (PEPCID) 20 MG tablet, Take 1 tablet  by mouth 2 (Two) Times a Day., Disp: , Rfl:     fluticasone (FLONASE) 50 MCG/ACT nasal spray, 2 sprays into the nostril(s) as directed by provider Daily As Needed for Rhinitis., Disp: , Rfl:     isosorbide mononitrate (IMDUR) 30 MG 24 hr tablet, TAKE 1 TABLET BY MOUTH EVERY 12 HOURS, Disp: 180 tablet, Rfl: 3    lisinopril (PRINIVIL,ZESTRIL) 5 MG tablet, Take 1 tablet by mouth Daily., Disp: 90 tablet, Rfl: 0    metoprolol tartrate (LOPRESSOR) 25 MG tablet, Take 1/2 (one-half) tablet by mouth once daily, Disp: 45 tablet, Rfl: 3    nitroglycerin (NITROSTAT) 0.4 MG SL tablet, 1 under the tongue as needed for angina, may repeat q5mins for up three doses, Disp: 100 tablet, Rfl: 11    rosuvastatin (CRESTOR) 40 MG tablet, TAKE 1 TABLET BY MOUTH ONCE DAILY AT NIGHT, Disp: 90 tablet, Rfl: 3    Allergies   Allergen Reactions    Adhesive Tape Itching     Family History   Problem Relation Age of Onset    Heart disease Father      Cancer-related family history is not on file.    Social History     Tobacco Use    Smoking status: Never     Passive exposure: Never    Smokeless tobacco: Never   Vaping Use    Vaping Use: Never used   Substance Use Topics    Alcohol use: No    Drug use: No     Social History     Social History Narrative    Not on file     ROS:   Review of Systems   Constitutional:  Negative for activity change, appetite change, chills, diaphoresis, fatigue, fever and unexpected weight change.   HENT:  Negative for congestion, dental problem, drooling, ear discharge, ear pain, facial swelling, hearing loss, mouth sores, nosebleeds, postnasal drip, rhinorrhea, sinus pressure, sinus pain, sneezing, sore throat, tinnitus, trouble swallowing and voice change.    Eyes:  Negative for photophobia, pain, discharge, redness, itching and visual disturbance.   Respiratory:  Negative for apnea, cough, choking, chest tightness, shortness of breath, wheezing and stridor.    Cardiovascular:  Negative for chest pain, palpitations and  leg swelling.   Gastrointestinal:  Negative for abdominal distention, abdominal pain, anal bleeding, blood in stool, constipation, diarrhea, nausea, rectal pain and vomiting.   Endocrine: Negative for cold intolerance, heat intolerance, polydipsia and polyuria.   Genitourinary:  Negative for decreased urine volume, difficulty urinating, dysuria, flank pain, frequency, genital sores, hematuria and urgency.   Musculoskeletal:  Negative for arthralgias, back pain, gait problem, joint swelling, myalgias, neck pain and neck stiffness.   Skin:  Negative for color change, pallor and rash.   Neurological:  Negative for dizziness, tremors, seizures, syncope, facial asymmetry, speech difficulty, weakness, light-headedness, numbness and headaches.   Hematological:  Negative for adenopathy. Does not bruise/bleed easily.   Psychiatric/Behavioral:  Negative for agitation, behavioral problems, confusion, decreased concentration, hallucinations, self-injury, sleep disturbance and suicidal ideas. The patient is not nervous/anxious.      Objective:    Vital Signs:  There were no vitals filed for this visit.  There is no height or weight on file to calculate BMI.    ECOG  (0) Fully active, able to carry on all predisease performance without restriction    Physical Exam:   Physical Exam  Constitutional:       General: He is not in acute distress.     Appearance: He is not ill-appearing, toxic-appearing or diaphoretic.   HENT:      Head: Normocephalic and atraumatic.      Right Ear: External ear normal.      Left Ear: External ear normal.      Nose: Nose normal.      Mouth/Throat:      Mouth: Mucous membranes are moist.      Pharynx: Oropharynx is clear.   Eyes:      General: No scleral icterus.        Right eye: No discharge.         Left eye: No discharge.      Conjunctiva/sclera: Conjunctivae normal.      Pupils: Pupils are equal, round, and reactive to light.   Cardiovascular:      Rate and Rhythm: Normal rate and regular rhythm.       Pulses: Normal pulses.      Heart sounds: Normal heart sounds. No murmur heard.     No friction rub. No gallop.   Pulmonary:      Effort: No respiratory distress.      Breath sounds: No stridor. No wheezing, rhonchi or rales.   Chest:      Chest wall: No tenderness.   Abdominal:      General: Abdomen is flat. Bowel sounds are normal. There is no distension.      Palpations: Abdomen is soft. There is no mass.      Tenderness: There is no abdominal tenderness. There is no right CVA tenderness, left CVA tenderness, guarding or rebound.   Musculoskeletal:         General: No swelling, tenderness, deformity or signs of injury.      Cervical back: No rigidity.      Right lower leg: No edema.      Left lower leg: No edema.   Lymphadenopathy:      Cervical: No cervical adenopathy.   Skin:     General: Skin is warm and dry.      Coloration: Skin is not jaundiced.      Findings: No bruising or rash.   Neurological:      General: No focal deficit present.      Mental Status: He is alert and oriented to person, place, and time.      Gait: Gait normal.   Psychiatric:         Mood and Affect: Mood normal.         Behavior: Behavior normal.         Thought Content: Thought content normal.         Judgment: Judgment normal.     MESHA Mcgarry MD performed the physical exam on 1/16/2024 as documented above.     Lab Results - Last 18 Months   Lab Units 10/12/23  0803   WBC 10*3/mm3 5.30   HEMOGLOBIN g/dL 15.3   HEMATOCRIT % 46.6   PLATELETS 10*3/mm3 139*   MCV fL 89.9     Lab Results   Component Value Date    GLUCOSE 165 (H) 07/09/2022    BUN 14 07/09/2022    CREATININE 0.96 07/09/2022    EGFRIFNONA 67 02/01/2021    EGFRIFAFRI 77 02/01/2021    BCR 14.6 07/09/2022    K 4.2 07/09/2022    CO2 22.0 07/09/2022    CALCIUM 8.3 (L) 07/09/2022    PROTENTOTREF 6.1 05/20/2022    ALBUMIN 3.70 07/09/2022    LABIL2 1.7 05/20/2022    AST 22 07/09/2022    ALT 14 07/09/2022     Lab Results - Last 18 Months   Lab Units 10/12/23  0803   INR  1.15*    APTT seconds 28.3     Lab Results   Component Value Date    PTT 28.3 10/12/2023    INR 1.15 (H) 10/12/2023     Lab Results   Component Value Date    PSA 0.2 05/20/2022     Assessment & Plan     Extranodal marginal zone B cell lymphoma: Seems to have low burden disease and is completely asymptomatic. At this time no intervention but he will have laboratory exams and will see me with new scans.   Coronary artery disease  Congestive heart failure.   Reviewed the records from [facility] and [facility]. Reviewed all laboratory exams and discussed with him.     Bright Mcgarry MD on 1/16/2024 at 12:53 PM.

## 2024-01-16 ENCOUNTER — CONSULT (OUTPATIENT)
Dept: ONCOLOGY | Facility: CLINIC | Age: 79
End: 2024-01-16
Payer: MEDICARE

## 2024-01-16 VITALS
WEIGHT: 242 LBS | HEART RATE: 61 BPM | BODY MASS INDEX: 32.78 KG/M2 | OXYGEN SATURATION: 99 % | TEMPERATURE: 97.8 F | HEIGHT: 72 IN

## 2024-01-16 DIAGNOSIS — C85.10 B-CELL LYMPHOMA, UNSPECIFIED B-CELL LYMPHOMA TYPE, UNSPECIFIED BODY REGION: Primary | ICD-10-CM

## 2024-01-17 LAB
ALBUMIN SERPL-MCNC: 4.3 G/DL (ref 3.8–4.8)
ALBUMIN/GLOB SERPL: 1.9 {RATIO} (ref 1.2–2.2)
ALP SERPL-CCNC: 76 IU/L (ref 44–121)
ALT SERPL-CCNC: 15 IU/L (ref 0–44)
AMBIG ABBREV CMP14 DEFAULT: NORMAL
AST SERPL-CCNC: 25 IU/L (ref 0–40)
BASOPHILS # BLD AUTO: 0.1 X10E3/UL (ref 0–0.2)
BASOPHILS NFR BLD AUTO: 1 %
BILIRUB SERPL-MCNC: 0.6 MG/DL (ref 0–1.2)
BUN SERPL-MCNC: 15 MG/DL (ref 8–27)
BUN/CREAT SERPL: 14 (ref 10–24)
CALCIUM SERPL-MCNC: 9 MG/DL (ref 8.6–10.2)
CHLORIDE SERPL-SCNC: 107 MMOL/L (ref 96–106)
CO2 SERPL-SCNC: 22 MMOL/L (ref 20–29)
CREAT SERPL-MCNC: 1.11 MG/DL (ref 0.76–1.27)
EGFRCR SERPLBLD CKD-EPI 2021: 69 ML/MIN/1.73
EOSINOPHIL # BLD AUTO: 0.4 X10E3/UL (ref 0–0.4)
EOSINOPHIL NFR BLD AUTO: 6 %
ERYTHROCYTE [DISTWIDTH] IN BLOOD BY AUTOMATED COUNT: 14.2 % (ref 11.6–15.4)
GLOBULIN SER CALC-MCNC: 2.3 G/DL (ref 1.5–4.5)
GLUCOSE SERPL-MCNC: 109 MG/DL (ref 70–99)
HCT VFR BLD AUTO: 49.4 % (ref 37.5–51)
HGB BLD-MCNC: 16.5 G/DL (ref 13–17.7)
IMM GRANULOCYTES # BLD AUTO: 0 X10E3/UL (ref 0–0.1)
IMM GRANULOCYTES NFR BLD AUTO: 1 %
LDH SERPL L TO P-CCNC: 280 IU/L (ref 121–224)
LYMPHOCYTES # BLD AUTO: 0.7 X10E3/UL (ref 0.7–3.1)
LYMPHOCYTES NFR BLD AUTO: 13 %
MCH RBC QN AUTO: 29.3 PG (ref 26.6–33)
MCHC RBC AUTO-ENTMCNC: 33.4 G/DL (ref 31.5–35.7)
MCV RBC AUTO: 88 FL (ref 79–97)
MONOCYTES # BLD AUTO: 0.8 X10E3/UL (ref 0.1–0.9)
MONOCYTES NFR BLD AUTO: 14 %
NEUTROPHILS # BLD AUTO: 3.9 X10E3/UL (ref 1.4–7)
NEUTROPHILS NFR BLD AUTO: 65 %
PLATELET # BLD AUTO: 181 X10E3/UL (ref 150–450)
POTASSIUM SERPL-SCNC: 4.3 MMOL/L (ref 3.5–5.2)
PROT SERPL-MCNC: 6.6 G/DL (ref 6–8.5)
RBC # BLD AUTO: 5.64 X10E6/UL (ref 4.14–5.8)
SODIUM SERPL-SCNC: 141 MMOL/L (ref 134–144)
WBC # BLD AUTO: 5.9 X10E3/UL (ref 3.4–10.8)

## 2024-02-06 ENCOUNTER — HOSPITAL ENCOUNTER (OUTPATIENT)
Dept: PET IMAGING | Facility: HOSPITAL | Age: 79
Discharge: HOME | End: 2024-02-06
Admitting: INTERNAL MEDICINE
Payer: MEDICARE

## 2024-02-06 DIAGNOSIS — C85.10 B-CELL LYMPHOMA, UNSPECIFIED B-CELL LYMPHOMA TYPE, UNSPECIFIED BODY REGION: ICD-10-CM

## 2024-02-06 PROCEDURE — 71260 CT THORAX DX C+: CPT

## 2024-02-06 PROCEDURE — 74177 CT ABD & PELVIS W/CONTRAST: CPT

## 2024-02-06 PROCEDURE — 25510000001 IOPAMIDOL PER 1 ML: Performed by: INTERNAL MEDICINE

## 2024-02-06 RX ADMIN — IOPAMIDOL 100 ML: 755 INJECTION, SOLUTION INTRAVENOUS at 11:38

## 2024-02-08 RX ORDER — CLOPIDOGREL BISULFATE 75 MG/1
TABLET ORAL
Qty: 90 TABLET | Refills: 0 | Status: SHIPPED | OUTPATIENT
Start: 2024-02-08

## 2024-02-08 NOTE — TELEPHONE ENCOUNTER
Rx Refill Note  Requested Prescriptions     Pending Prescriptions Disp Refills    clopidogrel (PLAVIX) 75 MG tablet [Pharmacy Med Name: Clopidogrel Bisulfate 75 MG Oral Tablet] 90 tablet 0     Sig: Take 1 tablet by mouth once daily      Last office visit with prescribing clinician: 12/13/2022   Last telemedicine visit with prescribing clinician: Visit date not found   Next office visit with prescribing clinician: Visit date not found                         Would you like a call back once the refill request has been completed: [] Yes [] No    If the office needs to give you a call back, can they leave a voicemail: [] Yes [] No    Nimco Alexander MA  02/08/24, 10:59 EST

## 2024-02-23 NOTE — PROGRESS NOTES
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     History of Present Illness:  Patient is a 78 y.o.     2024: In the office completely asymptomatic to transfer his care from [facility]. He first was seen in late 2023 for a screening colonoscopy. Linear furrows throughout the terminal ileum were felt to be abnormal. He also had colonic polyps, diverticulosis and hemorrhoids in the colon. The biopsies confirmed tubular adenomas and hyperplastic polyps. In addition there was an atypical mixed B cell infiltrate. B cell re-arrangement was performed. Monoclonal B cells were confirmed. This was reported to be consistent with extranodal marginal zone lymphoma. He was entirely asymptomatic. Imaging reported no abnormalities in the abdomen. In the mediastinum there were enlarged mediastinal and hilar lymph nodes, which were partially calcified, similar to a previous scan; this included a 2.5 X 1.6 cm right paratracheal node and a 1.9 X 1.3 cm right infrahilar node. In early  he had undergone a biopsy that had reported non-caseating granulomata that was consistent with sarcoidosis. The PET scan reported bilateral hilar and mediastinal adenopathy that were fluorodeoxyglucose avid. Otherwise there were no areas of abnormal tracer accumulation. Bone marrow biopsy was negative. A decision was made then to treat him with rituximab. He received the first of 4 doses on 10/28/2023 and completed a fourth dose on 11/3/2023. He had fever of up to 101.2 after the rituximab injections. A follow up PET scan only a few days later, showed no change. Today he tells me he remains perfectly asymptomatic. He has maintained a stable weight and has been afebrile. He has been without nocturnal  diaphoresis. He has maintained a good appetite and is generally energetic. He has been eating well and denies nausea or vomiting. Has not had any chest pain or cough and has not had diarrhea or dysuria. No edema. On exam no palpable adenopathy. The laboratory exams were reviewed. I'm not sure he needed any specific treatment and at this time he seems to be doing well and is likely to continue to do well without intervention. He is to have scans and laboratory exams and will see me with new scans.     2/27/2024: Entirely asymptomatic.  Afebrile, without unintended weight loss and without nocturnal diaphoresis.  No abdominal pain.  Eating well and without nausea or vomiting.  On exam no palpable adenopathy in the neck, supraclavicular regions, axillary spaces wearing wider spaces.  The abdomen is rounded and soft.  Not tender.  The liver and spleen do not seem enlarged and I cannot palpate any tumors.  The laboratory exams were reviewed.  The blood count is well within the normal range.  Chemistry is also adequate.  To see me again in 4 months.  Consider scans in 6 months.  Consider a colonoscopy sometime in 2025.  Discussed with him at length.    Past Medical History:   Diagnosis Date    CHF (congestive heart failure)     Coronary artery disease     GERD (gastroesophageal reflux disease)     Hyperlipidemia     Hypertension     Myocardial infarction      Past Surgical History:   Procedure Laterality Date    CARDIAC CATHETERIZATION  01/2007    TIA: proximal LAD    CARDIAC CATHETERIZATION N/A 07/09/2022    Procedure: Left Heart Cath possible PCI, atherectomy, hemodynamic support;  Surgeon: John Perez MD;  Location: [facility] CATH INVASIVE LOCATION;  Service: Cardiovascular;  Laterality: N/A;    COLONOSCOPY  07/2023    COLONOSCOPY N/A 06/27/2023    Procedure: COLONOSCOPY with terminal ileum biopsy's and cold snare polypectomy x 10;  Surgeon: Neymar Watson MD;  Location: [facility] ENDOSCOPY;  Service: Gastroenterology;   Laterality: N/A;  diverticulosis, internal hemorrhoids    HERNIA REPAIR      TOTAL HIP ARTHROPLASTY Left        Current Outpatient Medications:     amLODIPine (NORVASC) 5 MG tablet, Take 1 tablet by mouth Daily., Disp: 90 tablet, Rfl: 2    aspirin 81 MG EC tablet, 1 tablet Daily., Disp: , Rfl:     clopidogrel (PLAVIX) 75 MG tablet, Take 1 tablet by mouth once daily, Disp: 90 tablet, Rfl: 0    diphenhydrAMINE-acetaminophen (TYLENOL PM)  MG tablet per tablet, Take 2 tablets by mouth Every Night., Disp: , Rfl:     famotidine (PEPCID) 20 MG tablet, Take 1 tablet by mouth 2 (Two) Times a Day., Disp: , Rfl:     fluticasone (FLONASE) 50 MCG/ACT nasal spray, 2 sprays into the nostril(s) as directed by provider Daily As Needed for Rhinitis., Disp: , Rfl:     isosorbide mononitrate (IMDUR) 30 MG 24 hr tablet, TAKE 1 TABLET BY MOUTH EVERY 12 HOURS, Disp: 180 tablet, Rfl: 3    lisinopril (PRINIVIL,ZESTRIL) 5 MG tablet, Take 1 tablet by mouth Daily., Disp: 90 tablet, Rfl: 0    metoprolol tartrate (LOPRESSOR) 25 MG tablet, Take 1 tablet by mouth Daily., Disp: 90 tablet, Rfl: 1    nitroglycerin (NITROSTAT) 0.4 MG SL tablet, 1 under the tongue as needed for angina, may repeat q5mins for up three doses, Disp: 100 tablet, Rfl: 11    rosuvastatin (CRESTOR) 40 MG tablet, TAKE 1 TABLET BY MOUTH ONCE DAILY AT NIGHT, Disp: 90 tablet, Rfl: 3    Allergies   Allergen Reactions    Adhesive Tape Itching     Family History   Problem Relation Age of Onset    Parkinsonism Mother     Heart disease Father     Valvular heart disease Father     Pancreatic cancer Sister 85    Dementia Sister     Dementia Sister      Cancer-related family history includes Pancreatic cancer (age of onset: 85) in his sister.    Social History     Tobacco Use    Smoking status: Never     Passive exposure: Never    Smokeless tobacco: Never   Vaping Use    Vaping Use: Never used   Substance Use Topics    Alcohol use: No    Drug use: No     Social History     Social  "History Narrative    Not on file     ROS:   Review of Systems   Constitutional:  Negative for activity change, appetite change, chills, diaphoresis, fatigue, fever and unexpected weight change.   HENT:  Negative for congestion, dental problem, drooling, ear discharge, ear pain, facial swelling, hearing loss, mouth sores, nosebleeds, postnasal drip, rhinorrhea, sinus pressure, sinus pain, sneezing, sore throat, tinnitus, trouble swallowing and voice change.    Eyes:  Negative for photophobia, pain, discharge, redness, itching and visual disturbance.   Respiratory:  Negative for apnea, cough, choking, chest tightness, shortness of breath, wheezing and stridor.    Cardiovascular:  Negative for chest pain, palpitations and leg swelling.   Gastrointestinal:  Negative for abdominal distention, abdominal pain, anal bleeding, blood in stool, constipation, diarrhea, nausea, rectal pain and vomiting.   Endocrine: Negative for cold intolerance, heat intolerance, polydipsia and polyuria.   Genitourinary:  Negative for decreased urine volume, difficulty urinating, dysuria, flank pain, frequency, genital sores, hematuria and urgency.   Musculoskeletal:  Negative for arthralgias, back pain, gait problem, joint swelling, myalgias, neck pain and neck stiffness.   Skin:  Negative for color change, pallor and rash.   Neurological:  Negative for dizziness, tremors, seizures, syncope, facial asymmetry, speech difficulty, weakness, light-headedness, numbness and headaches.   Hematological:  Negative for adenopathy. Does not bruise/bleed easily.   Psychiatric/Behavioral:  Negative for agitation, behavioral problems, confusion, decreased concentration, hallucinations, self-injury, sleep disturbance and suicidal ideas. The patient is not nervous/anxious.      Objective:    Vital Signs:  Vitals:    02/27/24 1117   Pulse: 78   Temp: 97.8 °F (36.6 °C)   SpO2: 98%   Weight: 112 kg (246 lb)   Height: 182.9 cm (72\")   PainSc: 0-No pain     Body " mass index is 33.36 kg/m².    ECOG  (0) Fully active, able to carry on all predisease performance without restriction    Physical Exam:   Physical Exam  Constitutional:       General: He is not in acute distress.     Appearance: He is not ill-appearing, toxic-appearing or diaphoretic.   HENT:      Head: Normocephalic and atraumatic.      Right Ear: External ear normal.      Left Ear: External ear normal.      Nose: Nose normal.      Mouth/Throat:      Mouth: Mucous membranes are moist.      Pharynx: Oropharynx is clear.   Eyes:      General: No scleral icterus.        Right eye: No discharge.         Left eye: No discharge.      Conjunctiva/sclera: Conjunctivae normal.      Pupils: Pupils are equal, round, and reactive to light.   Cardiovascular:      Rate and Rhythm: Normal rate and regular rhythm.      Pulses: Normal pulses.      Heart sounds: Normal heart sounds. No murmur heard.     No friction rub. No gallop.   Pulmonary:      Effort: No respiratory distress.      Breath sounds: No stridor. No wheezing, rhonchi or rales.   Chest:      Chest wall: No tenderness.   Abdominal:      General: Abdomen is flat. Bowel sounds are normal. There is no distension.      Palpations: Abdomen is soft. There is no mass.      Tenderness: There is no abdominal tenderness. There is no right CVA tenderness, left CVA tenderness, guarding or rebound.   Musculoskeletal:         General: No swelling, tenderness, deformity or signs of injury.      Cervical back: No rigidity.      Right lower leg: No edema.      Left lower leg: No edema.   Lymphadenopathy:      Cervical: No cervical adenopathy.   Skin:     General: Skin is warm and dry.      Coloration: Skin is not jaundiced.      Findings: No bruising or rash.   Neurological:      General: No focal deficit present.      Mental Status: He is alert and oriented to person, place, and time.      Gait: Gait normal.   Psychiatric:         Mood and Affect: Mood normal.         Behavior:  Behavior normal.         Thought Content: Thought content normal.         Judgment: Judgment normal.     I Bright Mcgarry MD performed the physical exam on 2/27/2024 as documented above.    Lab Results - Last 18 Months   Lab Units 01/16/24  1206 10/12/23  0803   WBC x10E3/uL 5.9 5.30   HEMOGLOBIN g/dL 16.5 15.3   HEMATOCRIT % 49.4 46.6   PLATELETS x10E3/uL 181 139*   MCV fL 88 89.9     Lab Results   Component Value Date    GLUCOSE 109 (H) 01/16/2024    BUN 15 01/16/2024    CREATININE 1.11 01/16/2024    EGFRIFNONA 67 02/01/2021    EGFRIFAFRI 77 02/01/2021    BCR 14 01/16/2024    K 4.3 01/16/2024    CO2 22 01/16/2024    CALCIUM 9.0 01/16/2024    PROTENTOTREF 6.6 01/16/2024    ALBUMIN 4.3 01/16/2024    LABIL2 1.9 01/16/2024    AST 25 01/16/2024    ALT 15 01/16/2024     Lab Results - Last 18 Months   Lab Units 10/12/23  0803   INR  1.15*   APTT seconds 28.3     Lab Results   Component Value Date    PTT 28.3 10/12/2023    INR 1.15 (H) 10/12/2023     Lab Results   Component Value Date    PSA 0.2 05/20/2022     Assessment & Plan     Extranodal marginal zone B cell lymphoma: There is no clinical or radiographic evidence of disease progression.  Laboratory exams are unremarkable.  Coronary artery disease  Congestive heart failure.   Independently reviewed the images of the scan.  Discussed with him.  Reviewed the reports.  Reviewed laboratory exams and discussed with him.  He is to see me in approximately 4 months.  Laboratory exams prior to that.    Bright Mcgarry MD on 2/27/2024 at 11:37 AM.

## 2024-02-27 ENCOUNTER — OFFICE VISIT (OUTPATIENT)
Dept: ONCOLOGY | Facility: CLINIC | Age: 79
End: 2024-02-27
Payer: MEDICARE

## 2024-02-27 VITALS
OXYGEN SATURATION: 98 % | BODY MASS INDEX: 33.32 KG/M2 | WEIGHT: 246 LBS | HEART RATE: 78 BPM | TEMPERATURE: 97.8 F | HEIGHT: 72 IN

## 2024-02-27 DIAGNOSIS — C85.10 B-CELL LYMPHOMA, UNSPECIFIED B-CELL LYMPHOMA TYPE, UNSPECIFIED BODY REGION: Primary | ICD-10-CM

## 2024-03-18 RX ORDER — AMLODIPINE BESYLATE 5 MG/1
5 TABLET ORAL DAILY
Qty: 90 TABLET | Refills: 1 | Status: SHIPPED | OUTPATIENT
Start: 2024-03-18

## 2024-03-18 NOTE — PROGRESS NOTES
Encounter Date:03/21/2023      Patient ID: Donald Navarro is a 78 y.o. male.    Chief Complaint   Patient presents with    Follow-up     6 month F/U          History of Present Illness  77-year-old with a history of CAD status post PCI to the LAD, hypertension, hyperlipidemia who presents for follow-up.  He has been doing well from a cardiac standpoint but does mention that he has had increasing dyspnea on exertion.  He does not feel like he has as much energy as before.  Denies any chest pain.  No orthopnea.  No PND or lower extremity edema.  Blood pressure has been well-controlled at home.  He was told he was in remission from his lymphoma.  EKG in clinic today shows sinus rhythm with a rate of 66 bpm and no acute ST-T changes.  No change compared to prior.    Previous note  I have reviewed his cardiac catheterization from 7/9/2022.  He has a previous stent in the LAD which is patent but it appears that there was a long lesion that was stented.  He has slow flow in the LAD.  He has also significant disease in his diagonals.  His circumflex has about 30% stenosis in the midsegment.  His RCA has about 30 to 40% stenosis.    The following portions of the patient's history were reviewed and updated as appropriate: allergies, current medications, past family history, past medical history, past social history, past surgical history, and problem list.    Review of Systems   Constitutional: Positive for malaise/fatigue.   Eyes:  Negative for redness.   Cardiovascular:  Negative for chest pain, dyspnea on exertion, leg swelling and palpitations.   Respiratory:  Positive for shortness of breath. Negative for cough.    Gastrointestinal:  Negative for abdominal pain, nausea and vomiting.   Neurological:  Negative for dizziness, focal weakness, headaches, light-headedness and numbness.   All other systems reviewed and are negative.        Current Outpatient Medications:     amLODIPine (NORVASC) 5 MG tablet, Take 1 tablet by  mouth once daily, Disp: 90 tablet, Rfl: 1    aspirin 81 MG EC tablet, 1 tablet Daily., Disp: , Rfl:     clopidogrel (PLAVIX) 75 MG tablet, Take 1 tablet by mouth once daily, Disp: 90 tablet, Rfl: 0    diphenhydrAMINE-acetaminophen (TYLENOL PM)  MG tablet per tablet, Take 2 tablets by mouth At Night As Needed., Disp: , Rfl:     famotidine (PEPCID) 20 MG tablet, Take 1 tablet by mouth 2 (Two) Times a Day., Disp: , Rfl:     fluticasone (FLONASE) 50 MCG/ACT nasal spray, 2 sprays into the nostril(s) as directed by provider Daily As Needed for Rhinitis., Disp: , Rfl:     isosorbide mononitrate (IMDUR) 30 MG 24 hr tablet, TAKE 1 TABLET BY MOUTH EVERY 12 HOURS, Disp: 180 tablet, Rfl: 3    lisinopril (PRINIVIL,ZESTRIL) 5 MG tablet, Take 1 tablet by mouth Daily., Disp: 90 tablet, Rfl: 0    nitroglycerin (NITROSTAT) 0.4 MG SL tablet, 1 under the tongue as needed for angina, may repeat q5mins for up three doses, Disp: 100 tablet, Rfl: 11    rosuvastatin (CRESTOR) 40 MG tablet, TAKE 1 TABLET BY MOUTH ONCE DAILY AT NIGHT, Disp: 90 tablet, Rfl: 3    metoprolol succinate XL (TOPROL-XL) 25 MG 24 hr tablet, Take 0.5 tablets by mouth Daily., Disp: 90 tablet, Rfl: 3    Allergies   Allergen Reactions    Adhesive Tape Itching       Family History   Problem Relation Age of Onset    Parkinsonism Mother     Heart disease Father     Valvular heart disease Father     Pancreatic cancer Sister 85    Dementia Sister     Dementia Sister        Past Surgical History:   Procedure Laterality Date    CARDIAC CATHETERIZATION  01/2007    TIA: proximal LAD    CARDIAC CATHETERIZATION N/A 07/09/2022    Procedure: Left Heart Cath possible PCI, atherectomy, hemodynamic support;  Surgeon: John Perez MD;  Location: [facility] INVASIVE LOCATION;  Service: Cardiovascular;  Laterality: N/A;    COLONOSCOPY  07/2023    COLONOSCOPY N/A 06/27/2023    Procedure: COLONOSCOPY with terminal ileum biopsy's and cold snare polypectomy x 10;  Surgeon: Shirley  "MD Neymar;  Location: [facility] ENDOSCOPY;  Service: Gastroenterology;  Laterality: N/A;  diverticulosis, internal hemorrhoids    HERNIA REPAIR      TOTAL HIP ARTHROPLASTY Left        Past Medical History:   Diagnosis Date    CHF (congestive heart failure)     Coronary artery disease     GERD (gastroesophageal reflux disease)     Hyperlipidemia     Hypertension     Myocardial infarction        Social History     Socioeconomic History    Marital status:    Tobacco Use    Smoking status: Never     Passive exposure: Never    Smokeless tobacco: Never   Vaping Use    Vaping status: Never Used   Substance and Sexual Activity    Alcohol use: No    Drug use: No    Sexual activity: Defer         Procedures      Objective:       Physical Exam    /79 (BP Location: Left arm, Patient Position: Sitting)   Pulse 72   Ht 182.9 cm (72\")   Wt 111 kg (245 lb)   SpO2 97%   BMI 33.23 kg/m²   The patient is alert, oriented and in no distress.    Vital signs as noted above.    Head and neck revealed no carotid bruits or jugular venous distension.  No thyromegaly or lymphadenopathy is present.    Lungs clear.  No wheezing.  Breath sounds are normal bilaterally.    Heart normal first and second heart sounds.  No murmur..  No pericardial rub is present.  No gallop is present.    Abdomen soft and nontender.  No organomegaly is present.    Extremities revealed good peripheral pulses without any pedal edema.    Skin warm and dry.    Musculoskeletal system is grossly normal.    CNS grossly normal.           Diagnosis Plan   1. Coronary artery disease involving native coronary artery of native heart without angina pectoris [I25.10]        2. Pure hypercholesterolemia        3. Primary hypertension        4. Lymphoma, unspecified body region, unspecified lymphoma type        5. Dyspnea on exertion          LAB RESULTS (LAST 7 DAYS)    CBC        BMP        CMP         BNP        TROPONIN        CoAg        Creatinine " Clearance  CrCl cannot be calculated (Patient's most recent lab result is older than the maximum 30 days allowed.).    ABG        Radiology  No radiology results for the last day         Assessment and Plan       Diagnoses and all orders for this visit:    1. Coronary artery disease involving native coronary artery of native heart without angina pectoris [I25.10] (Primary)    2. Pure hypercholesterolemia    3. Primary hypertension    4. Lymphoma, unspecified body region, unspecified lymphoma type    5. Dyspnea on exertion    Other orders  -     metoprolol succinate XL (TOPROL-XL) 25 MG 24 hr tablet; Take 0.5 tablets by mouth Daily.  Dispense: 90 tablet; Refill: 3         Coronary artery disease  Previous PCI to the LAD with slow flow in the LAD  Continue medical management with amlodipine and Imdur 30 mg along   Change metoprolol to succinate  Currently on aspirin and Plavix  No bleeding issues  No further episodes of chest pain    Hypertension  Well-controlled on current regimen    Hyperlipidemia  On rosuvastatin 40 mg  Last LDL was 70 and at goal  Repeat lipid panel now    Recently diagnosed lymphoma  In remission    Dyspnea on exertion  Check echocardiogram          Edwige Jerome MD

## 2024-03-19 ENCOUNTER — OFFICE VISIT (OUTPATIENT)
Dept: CARDIOLOGY | Facility: CLINIC | Age: 79
End: 2024-03-19
Payer: MEDICARE

## 2024-03-19 VITALS
HEIGHT: 72 IN | BODY MASS INDEX: 33.18 KG/M2 | OXYGEN SATURATION: 97 % | SYSTOLIC BLOOD PRESSURE: 130 MMHG | DIASTOLIC BLOOD PRESSURE: 79 MMHG | WEIGHT: 245 LBS | HEART RATE: 72 BPM

## 2024-03-19 DIAGNOSIS — I25.10 CORONARY ARTERY DISEASE INVOLVING NATIVE CORONARY ARTERY OF NATIVE HEART WITHOUT ANGINA PECTORIS: Primary | Chronic | ICD-10-CM

## 2024-03-19 DIAGNOSIS — C85.90 LYMPHOMA, UNSPECIFIED BODY REGION, UNSPECIFIED LYMPHOMA TYPE: ICD-10-CM

## 2024-03-19 DIAGNOSIS — E78.00 PURE HYPERCHOLESTEROLEMIA: ICD-10-CM

## 2024-03-19 DIAGNOSIS — R06.09 DYSPNEA ON EXERTION: ICD-10-CM

## 2024-03-19 DIAGNOSIS — I10 PRIMARY HYPERTENSION: ICD-10-CM

## 2024-03-19 RX ORDER — METOPROLOL SUCCINATE 25 MG/1
12.5 TABLET, EXTENDED RELEASE ORAL DAILY
Qty: 90 TABLET | Refills: 3 | Status: SHIPPED | OUTPATIENT
Start: 2024-03-19

## 2024-03-21 LAB
AMBIG ABBREV LP DEFAULT: NORMAL
CHOLEST SERPL-MCNC: 149 MG/DL (ref 100–199)
HDLC SERPL-MCNC: 32 MG/DL
LDLC SERPL CALC-MCNC: 84 MG/DL (ref 0–99)
TRIGL SERPL-MCNC: 191 MG/DL (ref 0–149)
VLDLC SERPL CALC-MCNC: 33 MG/DL (ref 5–40)

## 2024-03-26 ENCOUNTER — TELEPHONE (OUTPATIENT)
Dept: CARDIOLOGY | Facility: CLINIC | Age: 79
End: 2024-03-26
Payer: MEDICARE

## 2024-03-26 NOTE — TELEPHONE ENCOUNTER
Lipid Panel (03/20/2024 08:34)   Pt saw his triglycerides were elevated and wanted to know if you wanted to add any medication or adjust his Rosuvastatin 40 mg

## 2024-03-27 RX ORDER — EZETIMIBE 10 MG/1
10 TABLET ORAL DAILY
Qty: 90 TABLET | Refills: 3 | Status: SHIPPED | OUTPATIENT
Start: 2024-03-27

## 2024-04-01 RX ORDER — LISINOPRIL 5 MG/1
5 TABLET ORAL DAILY
Qty: 90 TABLET | Refills: 0 | Status: SHIPPED | OUTPATIENT
Start: 2024-04-01

## 2024-04-01 NOTE — TELEPHONE ENCOUNTER
Rx Refill Note  Requested Prescriptions     Pending Prescriptions Disp Refills    lisinopril (PRINIVIL,ZESTRIL) 5 MG tablet [Pharmacy Med Name: Lisinopril 5 MG Oral Tablet] 90 tablet 0     Sig: Take 1 tablet by mouth once daily      Last office visit with prescribing clinician: 12/13/2022   Last telemedicine visit with prescribing clinician: Visit date not found   Next office visit with prescribing clinician: Visit date not found                         Would you like a call back once the refill request has been completed: [] Yes [] No    If the office needs to give you a call back, can they leave a voicemail: [] Yes [] No    Nimco Alexander MA  04/01/24, 09:10 EDT

## 2024-04-09 ENCOUNTER — OUTSIDE FACILITY SERVICE (OUTPATIENT)
Dept: CARDIOLOGY | Facility: CLINIC | Age: 79
End: 2024-04-09
Payer: MEDICARE

## 2024-04-09 PROCEDURE — 93356 MYOCRD STRAIN IMG SPCKL TRCK: CPT | Performed by: INTERNAL MEDICINE

## 2024-04-09 PROCEDURE — 93306 TTE W/DOPPLER COMPLETE: CPT | Performed by: INTERNAL MEDICINE

## 2024-04-10 ENCOUNTER — TELEPHONE (OUTPATIENT)
Dept: CARDIOLOGY | Facility: CLINIC | Age: 79
End: 2024-04-10
Payer: MEDICARE

## 2024-04-10 NOTE — TELEPHONE ENCOUNTER
Placed a call to patient and informed him that his ECHO from yesterday at [facility] has still not been scanned into EPIC, and once it has been scanned in and reviewed by Dr. Jerome, patient will be notified of results.  Patient verbalizes understanding.

## 2024-04-10 NOTE — TELEPHONE ENCOUNTER
----- Message from Donald Navarro sent at 4/10/2024 10:34 AM EDT -----  Regarding: Echogram  Contact: [phone]  I was wondering what the results of my echo gram was.  Thank you  Donald Navarro

## 2024-04-16 NOTE — TELEPHONE ENCOUNTER
Patient's ECHO from [place] has been scanned into EPIC.    Please advise,    Thanks Eva    Echocardiogram Scan (04/09/2024)

## 2024-04-16 NOTE — TELEPHONE ENCOUNTER
I called patient and informed him that his ECHO has been reviewed by Dr. Jerome and Dr. Jerome said that patient's heart function is normal and valves are normal too. Patient verbalizes understanding.

## 2024-05-13 RX ORDER — CLOPIDOGREL BISULFATE 75 MG/1
TABLET ORAL
Qty: 90 TABLET | Refills: 3 | Status: SHIPPED | OUTPATIENT
Start: 2024-05-13

## 2024-05-13 NOTE — TELEPHONE ENCOUNTER
Rx Refill Note  Requested Prescriptions     Pending Prescriptions Disp Refills    clopidogrel (PLAVIX) 75 MG tablet [Pharmacy Med Name: Clopidogrel Bisulfate 75 MG Oral Tablet] 90 tablet 0     Sig: Take 1 tablet by mouth once daily      Last office visit with prescribing clinician: 12/13/2022   Last telemedicine visit with prescribing clinician: Visit date not found   Next office visit with prescribing clinician: Visit date not found                         Would you like a call back once the refill request has been completed: [] Yes [] No    If the office needs to give you a call back, can they leave a voicemail: [] Yes [] No    Nimco Alexander MA  05/13/24, 08:44 EDT

## 2024-06-19 LAB
ALBUMIN SERPL-MCNC: 3.8 G/DL (ref 3.8–4.8)
ALP SERPL-CCNC: 67 IU/L (ref 44–121)
ALT SERPL-CCNC: 34 IU/L (ref 0–44)
AST SERPL-CCNC: 41 IU/L (ref 0–40)
BASOPHILS # BLD AUTO: 0.1 X10E3/UL (ref 0–0.2)
BASOPHILS NFR BLD AUTO: 2 %
BILIRUB SERPL-MCNC: 0.5 MG/DL (ref 0–1.2)
BUN SERPL-MCNC: 16 MG/DL (ref 8–27)
BUN/CREAT SERPL: 18 (ref 10–24)
CALCIUM SERPL-MCNC: 8.4 MG/DL (ref 8.6–10.2)
CHLORIDE SERPL-SCNC: 109 MMOL/L (ref 96–106)
CO2 SERPL-SCNC: 22 MMOL/L (ref 20–29)
CREAT SERPL-MCNC: 0.9 MG/DL (ref 0.76–1.27)
EGFRCR SERPLBLD CKD-EPI 2021: 89 ML/MIN/1.73
EOSINOPHIL # BLD AUTO: 0.5 X10E3/UL (ref 0–0.4)
EOSINOPHIL NFR BLD AUTO: 9 %
ERYTHROCYTE [DISTWIDTH] IN BLOOD BY AUTOMATED COUNT: 13.8 % (ref 11.6–15.4)
GLOBULIN SER CALC-MCNC: 2.2 G/DL (ref 1.5–4.5)
GLUCOSE SERPL-MCNC: 114 MG/DL (ref 70–99)
HCT VFR BLD AUTO: 46.4 % (ref 37.5–51)
HGB BLD-MCNC: 15 G/DL (ref 13–17.7)
IMM GRANULOCYTES # BLD AUTO: 0 X10E3/UL (ref 0–0.1)
IMM GRANULOCYTES NFR BLD AUTO: 0 %
LYMPHOCYTES # BLD AUTO: 1 X10E3/UL (ref 0.7–3.1)
LYMPHOCYTES NFR BLD AUTO: 19 %
MCH RBC QN AUTO: 29.9 PG (ref 26.6–33)
MCHC RBC AUTO-ENTMCNC: 32.3 G/DL (ref 31.5–35.7)
MCV RBC AUTO: 93 FL (ref 79–97)
MONOCYTES # BLD AUTO: 0.8 X10E3/UL (ref 0.1–0.9)
MONOCYTES NFR BLD AUTO: 15 %
NEUTROPHILS # BLD AUTO: 3 X10E3/UL (ref 1.4–7)
NEUTROPHILS NFR BLD AUTO: 55 %
PLATELET # BLD AUTO: 145 X10E3/UL (ref 150–450)
POTASSIUM SERPL-SCNC: 4.7 MMOL/L (ref 3.5–5.2)
PROT SERPL-MCNC: 6 G/DL (ref 6–8.5)
RBC # BLD AUTO: 5.01 X10E6/UL (ref 4.14–5.8)
SODIUM SERPL-SCNC: 141 MMOL/L (ref 134–144)
WBC # BLD AUTO: 5.3 X10E3/UL (ref 3.4–10.8)

## 2024-06-21 NOTE — PROGRESS NOTES
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     History of Present Illness:  Patient is a 78 y.o.     2024: In the office completely asymptomatic to transfer his care from [facility]. He first was seen in late 2023 for a screening colonoscopy. Linear furrows throughout the terminal ileum were felt to be abnormal. He also had colonic polyps, diverticulosis and hemorrhoids in the colon. The biopsies confirmed tubular adenomas and hyperplastic polyps. In addition there was an atypical mixed B cell infiltrate. B cell re-arrangement was performed. Monoclonal B cells were confirmed. This was reported to be consistent with extranodal marginal zone lymphoma. He was entirely asymptomatic. Imaging reported no abnormalities in the abdomen. In the mediastinum there were enlarged mediastinal and hilar lymph nodes, which were partially calcified, similar to a previous scan; this included a 2.5 X 1.6 cm right paratracheal node and a 1.9 X 1.3 cm right infrahilar node. In early  he had undergone a biopsy that had reported non-caseating granulomata that was consistent with sarcoidosis. The PET scan reported bilateral hilar and mediastinal adenopathy that were fluorodeoxyglucose avid. Otherwise there were no areas of abnormal tracer accumulation. Bone marrow biopsy was negative. A decision was made then to treat him with rituximab. He received the first of 4 doses on 10/28/2023 and completed a fourth dose on 11/3/2023. He had fever of up to 101.2 after the rituximab injections. A follow up PET scan only a few days later, showed no change. Today he tells me he remains perfectly asymptomatic. He has maintained a stable weight and has been afebrile. He has been without nocturnal  diaphoresis. He has maintained a good appetite and is generally energetic. He has been eating well and denies nausea or vomiting. Has not had any chest pain or cough and has not had diarrhea or dysuria. No edema. On exam no palpable adenopathy. The laboratory exams were reviewed. I'm not sure he needed any specific treatment and at this time he seems to be doing well and is likely to continue to do well without intervention. He is to have scans and laboratory exams and will see me with new scans.     2/27/2024: Entirely asymptomatic.  Afebrile, without unintended weight loss and without nocturnal diaphoresis.  No abdominal pain.  Eating well and without nausea or vomiting.  On exam no palpable adenopathy in the neck, supraclavicular regions, axillary spaces wearing wider spaces.  The abdomen is rounded and soft.  Not tender.  The liver and spleen do not seem enlarged and I cannot palpate any tumors.  The laboratory exams were reviewed.  The blood count is well within the normal range.  Chemistry is also adequate.  To see me again in 4 months.  Consider scans in 6 months.  Consider a colonoscopy sometime in 2025.  Discussed with him at length.    6/25/2024: Again without new symptoms.  As active as usual and without new limitations.  Maintains a good appetite and has had no nausea or vomiting.  No chest pains or cough and no abdominal pain, diarrhea or dysuria.  On exam alert and conversant.  Well-oriented and in no distress.  No jaundice and not pale.  The lungs are clear and the heart regular.  Abdomen is soft.  There is no edema.  Laboratory exams again reveal moderate thrombocytopenia.  Will continue to monitor without intervention at this time.  He is to see me in approximately 4 months but will have a platelet count checked in approximately 2 months.  Discussed with him.    Past Medical History:   Diagnosis Date    CHF (congestive heart failure)     Coronary artery disease     GERD (gastroesophageal reflux  disease)     Hyperlipidemia     Hypertension     Myocardial infarction      Past Surgical History:   Procedure Laterality Date    CARDIAC CATHETERIZATION  01/2007    TIA: proximal LAD    CARDIAC CATHETERIZATION N/A 07/09/2022    Procedure: Left Heart Cath possible PCI, atherectomy, hemodynamic support;  Surgeon: John Perez MD;  Location: [facility] CATH INVASIVE LOCATION;  Service: Cardiovascular;  Laterality: N/A;    COLONOSCOPY  07/2023    COLONOSCOPY N/A 06/27/2023    Procedure: COLONOSCOPY with terminal ileum biopsy's and cold snare polypectomy x 10;  Surgeon: Neymar Watson MD;  Location: [facility] ENDOSCOPY;  Service: Gastroenterology;  Laterality: N/A;  diverticulosis, internal hemorrhoids    HERNIA REPAIR      TOTAL HIP ARTHROPLASTY Left        Current Outpatient Medications:     amLODIPine (NORVASC) 5 MG tablet, Take 1 tablet by mouth once daily, Disp: 90 tablet, Rfl: 1    aspirin 81 MG EC tablet, 1 tablet Daily., Disp: , Rfl:     clopidogrel (PLAVIX) 75 MG tablet, Take 1 tablet by mouth once daily, Disp: 90 tablet, Rfl: 3    diphenhydrAMINE-acetaminophen (TYLENOL PM)  MG tablet per tablet, Take 2 tablets by mouth At Night As Needed., Disp: , Rfl:     ezetimibe (ZETIA) 10 MG tablet, Take 1 tablet by mouth Daily., Disp: 90 tablet, Rfl: 3    famotidine (PEPCID) 20 MG tablet, Take 1 tablet by mouth 2 (Two) Times a Day., Disp: , Rfl:     fluticasone (FLONASE) 50 MCG/ACT nasal spray, 2 sprays into the nostril(s) as directed by provider Daily As Needed for Rhinitis., Disp: , Rfl:     isosorbide mononitrate (IMDUR) 30 MG 24 hr tablet, TAKE 1 TABLET BY MOUTH EVERY 12 HOURS, Disp: 180 tablet, Rfl: 3    lisinopril (PRINIVIL,ZESTRIL) 5 MG tablet, Take 1 tablet by mouth once daily, Disp: 90 tablet, Rfl: 0    metoprolol succinate XL (TOPROL-XL) 25 MG 24 hr tablet, Take 0.5 tablets by mouth Daily., Disp: 90 tablet, Rfl: 3    nitroglycerin (NITROSTAT) 0.4 MG SL tablet, 1 under the tongue as needed for angina, may  repeat q5mins for up three doses, Disp: 100 tablet, Rfl: 11    rosuvastatin (CRESTOR) 40 MG tablet, TAKE 1 TABLET BY MOUTH ONCE DAILY AT NIGHT, Disp: 90 tablet, Rfl: 3    Allergies   Allergen Reactions    Adhesive Tape Itching     Family History   Problem Relation Age of Onset    Parkinsonism Mother     Heart disease Father     Valvular heart disease Father     Pancreatic cancer Sister 85    Dementia Sister     Dementia Sister      Cancer-related family history includes Pancreatic cancer (age of onset: 85) in his sister.    Social History     Tobacco Use    Smoking status: Never     Passive exposure: Never    Smokeless tobacco: Never   Vaping Use    Vaping status: Never Used   Substance Use Topics    Alcohol use: No    Drug use: No     Social History     Social History Narrative    Not on file     ROS:   Review of Systems   Constitutional:  Negative for activity change, appetite change, chills, diaphoresis, fatigue, fever and unexpected weight change.   HENT:  Negative for congestion, dental problem, drooling, ear discharge, ear pain, facial swelling, hearing loss, mouth sores, nosebleeds, postnasal drip, rhinorrhea, sinus pressure, sinus pain, sneezing, sore throat, tinnitus, trouble swallowing and voice change.    Eyes:  Negative for photophobia, pain, discharge, redness, itching and visual disturbance.   Respiratory:  Negative for apnea, cough, choking, chest tightness, shortness of breath, wheezing and stridor.    Cardiovascular:  Negative for chest pain, palpitations and leg swelling.   Gastrointestinal:  Negative for abdominal distention, abdominal pain, anal bleeding, blood in stool, constipation, diarrhea, nausea, rectal pain and vomiting.   Endocrine: Negative for cold intolerance, heat intolerance, polydipsia and polyuria.   Genitourinary:  Negative for decreased urine volume, difficulty urinating, dysuria, flank pain, frequency, genital sores, hematuria and urgency.   Musculoskeletal:  Negative for  "arthralgias, back pain, gait problem, joint swelling, myalgias, neck pain and neck stiffness.   Skin:  Negative for color change, pallor and rash.   Neurological:  Negative for dizziness, tremors, seizures, syncope, facial asymmetry, speech difficulty, weakness, light-headedness, numbness and headaches.   Hematological:  Negative for adenopathy. Does not bruise/bleed easily.   Psychiatric/Behavioral:  Negative for agitation, behavioral problems, confusion, decreased concentration, hallucinations, self-injury, sleep disturbance and suicidal ideas. The patient is not nervous/anxious.      Objective:    Vital Signs:  Vitals:    06/25/24 1059   Pulse: 67   Temp: 97.3 °F (36.3 °C)   SpO2: 97%   Weight: 110 kg (243 lb)   Height: 182.9 cm (72\")   PainSc: 0-No pain     Body mass index is 32.96 kg/m².    ECOG  (0) Fully active, able to carry on all predisease performance without restriction    Physical Exam:   Physical Exam  Constitutional:       General: He is not in acute distress.     Appearance: He is not ill-appearing, toxic-appearing or diaphoretic.   HENT:      Head: Normocephalic and atraumatic.      Right Ear: External ear normal.      Left Ear: External ear normal.      Nose: Nose normal.      Mouth/Throat:      Mouth: Mucous membranes are moist.      Pharynx: Oropharynx is clear.   Eyes:      General: No scleral icterus.        Right eye: No discharge.         Left eye: No discharge.      Conjunctiva/sclera: Conjunctivae normal.      Pupils: Pupils are equal, round, and reactive to light.   Cardiovascular:      Rate and Rhythm: Normal rate and regular rhythm.      Pulses: Normal pulses.      Heart sounds: Normal heart sounds. No murmur heard.     No friction rub. No gallop.   Pulmonary:      Effort: No respiratory distress.      Breath sounds: No stridor. No wheezing, rhonchi or rales.   Chest:      Chest wall: No tenderness.   Abdominal:      General: Abdomen is flat. Bowel sounds are normal. There is no " distension.      Palpations: Abdomen is soft. There is no mass.      Tenderness: There is no abdominal tenderness. There is no right CVA tenderness, left CVA tenderness, guarding or rebound.   Musculoskeletal:         General: No swelling, tenderness, deformity or signs of injury.      Cervical back: No rigidity.      Right lower leg: No edema.      Left lower leg: No edema.   Lymphadenopathy:      Cervical: No cervical adenopathy.   Skin:     General: Skin is warm and dry.      Coloration: Skin is not jaundiced.      Findings: No bruising or rash.   Neurological:      General: No focal deficit present.      Mental Status: He is alert and oriented to person, place, and time.      Gait: Gait normal.   Psychiatric:         Mood and Affect: Mood normal.         Behavior: Behavior normal.         Thought Content: Thought content normal.         Judgment: Judgment normal.     MESHA Mcgarry MD performed the physical exam on 6/25/2024 as documented above.    Lab Results - Last 18 Months   Lab Units 06/18/24  0814 01/16/24  1206 10/12/23  0803   WBC x10E3/uL 5.3 5.9 5.30   HEMOGLOBIN g/dL 15.0 16.5 15.3   HEMATOCRIT % 46.4 49.4 46.6   PLATELETS x10E3/uL 145* 181 139*   MCV fL 93 88 89.9     Lab Results   Component Value Date    GLUCOSE 114 (H) 06/18/2024    BUN 16 06/18/2024    CREATININE 0.90 06/18/2024    EGFRIFNONA 67 02/01/2021    EGFRIFAFRI 77 02/01/2021    BCR 18 06/18/2024    K 4.7 06/18/2024    CO2 22 06/18/2024    CALCIUM 8.4 (L) 06/18/2024    PROTENTOTREF 6.0 06/18/2024    ALBUMIN 3.8 06/18/2024    LABIL2 1.9 01/16/2024    AST 41 (H) 06/18/2024    ALT 34 06/18/2024     Lab Results - Last 18 Months   Lab Units 10/12/23  0803   INR  1.15*   APTT seconds 28.3     Lab Results   Component Value Date    PTT 28.3 10/12/2023    INR 1.15 (H) 10/12/2023     Lab Results   Component Value Date    PSA 0.2 05/20/2022     Assessment & Plan     Extranodal marginal zone B cell lymphoma: No suggestion of disease progression.   Will continue to follow.  Thrombocytopenia: Unclear cause.  Possibly laboratory artifact.  It has happened in the recent past at least once.  No need for intervention at this time other than close follow-up.  Coronary artery disease  Congestive heart failure.   Reviewed the notes from primary care.  Reviewed laboratory exams including blood counts and chemistries.  Discussed results with him.  See me in 4 months.  Have platelet count checked in 2 months.    Bright Mcgarry MD on 6/25/2024 at 11:26 AM.

## 2024-06-25 ENCOUNTER — OFFICE VISIT (OUTPATIENT)
Dept: ONCOLOGY | Facility: CLINIC | Age: 79
End: 2024-06-25
Payer: MEDICARE

## 2024-06-25 VITALS
OXYGEN SATURATION: 97 % | TEMPERATURE: 97.3 F | HEIGHT: 72 IN | HEART RATE: 67 BPM | BODY MASS INDEX: 32.91 KG/M2 | WEIGHT: 243 LBS

## 2024-06-25 DIAGNOSIS — C85.10 B-CELL LYMPHOMA, UNSPECIFIED B-CELL LYMPHOMA TYPE, UNSPECIFIED BODY REGION: Primary | ICD-10-CM

## 2024-06-25 PROCEDURE — 1160F RVW MEDS BY RX/DR IN RCRD: CPT | Performed by: INTERNAL MEDICINE

## 2024-06-25 PROCEDURE — 1159F MED LIST DOCD IN RCRD: CPT | Performed by: INTERNAL MEDICINE

## 2024-06-25 PROCEDURE — 1126F AMNT PAIN NOTED NONE PRSNT: CPT | Performed by: INTERNAL MEDICINE

## 2024-06-25 PROCEDURE — 99213 OFFICE O/P EST LOW 20 MIN: CPT | Performed by: INTERNAL MEDICINE

## 2024-06-26 RX ORDER — LISINOPRIL 5 MG/1
5 TABLET ORAL DAILY
Qty: 90 TABLET | Refills: 3 | Status: SHIPPED | OUTPATIENT
Start: 2024-06-26

## 2024-06-26 NOTE — TELEPHONE ENCOUNTER
Rx Refill Note  Requested Prescriptions     Pending Prescriptions Disp Refills    lisinopril (PRINIVIL,ZESTRIL) 5 MG tablet [Pharmacy Med Name: Lisinopril 5 MG Oral Tablet] 90 tablet 0     Sig: Take 1 tablet by mouth once daily      Last office visit with prescribing clinician: 12/13/2022   Last telemedicine visit with prescribing clinician: Visit date not found   Next office visit with prescribing clinician: Visit date not found                         Would you like a call back once the refill request has been completed: [] Yes [] No    If the office needs to give you a call back, can they leave a voicemail: [] Yes [] No    Nimco Alexander MA  06/26/24, 10:36 EDT

## 2024-08-20 ENCOUNTER — PATIENT MESSAGE (OUTPATIENT)
Dept: CARDIOLOGY | Facility: CLINIC | Age: 79
End: 2024-08-20
Payer: MEDICARE

## 2024-08-20 DIAGNOSIS — E78.00 PURE HYPERCHOLESTEROLEMIA: Primary | ICD-10-CM

## 2024-08-29 LAB
ALBUMIN SERPL-MCNC: 3.8 G/DL (ref 3.8–4.8)
ALP SERPL-CCNC: 64 IU/L (ref 44–121)
ALT SERPL-CCNC: 28 IU/L (ref 0–44)
AMBIG ABBREV CMP14 DEFAULT: NORMAL
AST SERPL-CCNC: 33 IU/L (ref 0–40)
BASOPHILS # BLD AUTO: 0.1 X10E3/UL (ref 0–0.2)
BASOPHILS NFR BLD AUTO: 1 %
BILIRUB SERPL-MCNC: 0.6 MG/DL (ref 0–1.2)
BUN SERPL-MCNC: 11 MG/DL (ref 8–27)
BUN/CREAT SERPL: 11 (ref 10–24)
CALCIUM SERPL-MCNC: 8.8 MG/DL (ref 8.6–10.2)
CHLORIDE SERPL-SCNC: 107 MMOL/L (ref 96–106)
CO2 SERPL-SCNC: 22 MMOL/L (ref 20–29)
CREAT SERPL-MCNC: 0.98 MG/DL (ref 0.76–1.27)
EGFRCR SERPLBLD CKD-EPI 2021: 79 ML/MIN/1.73
EOSINOPHIL # BLD AUTO: 0.5 X10E3/UL (ref 0–0.4)
EOSINOPHIL NFR BLD AUTO: 8 %
ERYTHROCYTE [DISTWIDTH] IN BLOOD BY AUTOMATED COUNT: 13.3 % (ref 11.6–15.4)
GLOBULIN SER CALC-MCNC: 2.2 G/DL (ref 1.5–4.5)
GLUCOSE SERPL-MCNC: 114 MG/DL (ref 70–99)
HCT VFR BLD AUTO: 49.1 % (ref 37.5–51)
HGB BLD-MCNC: 15.7 G/DL (ref 13–17.7)
IMM GRANULOCYTES # BLD AUTO: 0 X10E3/UL (ref 0–0.1)
IMM GRANULOCYTES NFR BLD AUTO: 0 %
LYMPHOCYTES # BLD AUTO: 1.1 X10E3/UL (ref 0.7–3.1)
LYMPHOCYTES NFR BLD AUTO: 19 %
MCH RBC QN AUTO: 30 PG (ref 26.6–33)
MCHC RBC AUTO-ENTMCNC: 32 G/DL (ref 31.5–35.7)
MCV RBC AUTO: 94 FL (ref 79–97)
MONOCYTES # BLD AUTO: 0.8 X10E3/UL (ref 0.1–0.9)
MONOCYTES NFR BLD AUTO: 14 %
NEUTROPHILS # BLD AUTO: 3.4 X10E3/UL (ref 1.4–7)
NEUTROPHILS NFR BLD AUTO: 58 %
PLATELET # BLD AUTO: 174 X10E3/UL (ref 150–450)
POTASSIUM SERPL-SCNC: 4.7 MMOL/L (ref 3.5–5.2)
PROT SERPL-MCNC: 6 G/DL (ref 6–8.5)
RBC # BLD AUTO: 5.23 X10E6/UL (ref 4.14–5.8)
SODIUM SERPL-SCNC: 141 MMOL/L (ref 134–144)
WBC # BLD AUTO: 5.9 X10E3/UL (ref 3.4–10.8)

## 2024-08-29 RX ORDER — ISOSORBIDE MONONITRATE 30 MG/1
30 TABLET, EXTENDED RELEASE ORAL EVERY 12 HOURS
Qty: 180 TABLET | Refills: 3 | Status: CANCELLED | OUTPATIENT
Start: 2024-08-29

## 2024-08-29 RX ORDER — ISOSORBIDE MONONITRATE 30 MG/1
30 TABLET, EXTENDED RELEASE ORAL EVERY 12 HOURS
Qty: 180 TABLET | Refills: 3 | Status: SHIPPED | OUTPATIENT
Start: 2024-08-29

## 2024-08-29 RX ORDER — ISOSORBIDE MONONITRATE 30 MG/1
TABLET, EXTENDED RELEASE ORAL
Qty: 180 TABLET | Refills: 0 | OUTPATIENT
Start: 2024-08-29

## 2024-08-29 NOTE — TELEPHONE ENCOUNTER
Rx Refill Note  Requested Prescriptions     Pending Prescriptions Disp Refills    isosorbide mononitrate (IMDUR) 30 MG 24 hr tablet [Pharmacy Med Name: Isosorbide Mononitrate ER 30 MG Oral Tablet Extended Release 24 Hour] 180 tablet 0     Sig: TAKE 1 TABLET BY MOUTH EVERY 12 HOURS      Last office visit with prescribing clinician: 12/13/2022   Last telemedicine visit with prescribing clinician: Visit date not found   Next office visit with prescribing clinician: Visit date not found                         Would you like a call back once the refill request has been completed: [] Yes [] No    If the office needs to give you a call back, can they leave a voicemail: [] Yes [] No    Elena Davies MA  08/29/24, 09:54 EDT

## 2024-08-29 NOTE — TELEPHONE ENCOUNTER
"AS OF PATIENTS LAST OFFICE VISIT IN 2022, CARE WAS TRANSFERRED TO     \"During this visit I also discussed with the patient about transferring his cardiac care over to Dr. Jerome who specializes in coronary disease, patient is agreeable. We will make the 3-month follow-up with her.\"    PATIENT IS NOW SEEING , AND A REFILL REQUEST HAS BEEN SENT TO HIM.  "

## 2024-08-29 NOTE — TELEPHONE ENCOUNTER
Rx Refill Note  Requested Prescriptions     Pending Prescriptions Disp Refills    isosorbide mononitrate (IMDUR) 30 MG 24 hr tablet 180 tablet 3     Sig: Take 1 tablet by mouth Every 12 (Twelve) Hours.     Refused Prescriptions Disp Refills    isosorbide mononitrate (IMDUR) 30 MG 24 hr tablet [Pharmacy Med Name: Isosorbide Mononitrate ER 30 MG Oral Tablet Extended Release 24 Hour] 180 tablet 0     Sig: TAKE 1 TABLET BY MOUTH EVERY 12 HOURS     Refused By: ARNALDO HASSAN     Reason for Refusal: Patient needs an appointment      Last office visit with prescribing clinician: 12/13/2022   Last telemedicine visit with prescribing clinician: Visit date not found   Next office visit with prescribing clinician: Visit date not found                         Would you like a call back once the refill request has been completed: [] Yes [] No    If the office needs to give you a call back, can they leave a voicemail: [] Yes [] No    Arnaldo Davies MA  08/29/24, 11:25 EDT

## 2024-08-29 NOTE — TELEPHONE ENCOUNTER
Caller: Ramon Donald CORONADO    Relationship: Self    Best call back number: [phone]    Requested Prescriptions:   Requested Prescriptions     Pending Prescriptions Disp Refills    isosorbide mononitrate (IMDUR) 30 MG 24 hr tablet 180 tablet 3     Sig: Take 1 tablet by mouth Every 12 (Twelve) Hours.        Pharmacy where request should be sent: [place] [phone] [place] [phone]      Last office visit with prescribing clinician: Visit date not found   Last telemedicine visit with prescribing clinician: Visit date not found   Next office visit with prescribing clinician: 9/10/2024         Does the patient have less than a 3 day supply:  [x] Yes  [] No    Would you like a call back once the refill request has been completed: [x] Yes [] No    If the office needs to give you a call back, can they leave a voicemail: [x] Yes [] No    Vadim Cat Rep   08/29/24 11:13 EDT         DELETE AFTER READING TO PATIENT: “Thank you for sharing this information with me. I will send a message to the clinical team. Please allow 48 hours for the clinical staff to follow up on this request.”

## 2024-09-07 NOTE — PROGRESS NOTES
Encounter Date:09/10/2024        Patient ID: Donald Navarro is a 79 y.o. male.      Chief Complaint:      History of Present Illness  79-year-old with a history of CAD status post PCI to the LAD, hypertension, hyperlipidemia who presents for follow-up.      He has been doing well from a cardiac standpoint but does mention that he has had increasing dyspnea on exertion.  He does not feel like he has as much energy as before.  Denies any chest pain.  No orthopnea.  No PND or lower extremity edema.  Blood pressure has been well-controlled at home.  He was told he was in remission from his lymphoma.    EKG in clinic today sinus bradycardia with heart rate 59, , QRS 80 and QTc 443 ms     Previous cardiac catheterization from 7/9/2022.  He has a previous stent in the LAD which is patent but it appears that there was a long lesion that was stented.  He has slow flow in the LAD.  He has also significant disease in his diagonals.  His circumflex has about 30% stenosis in the midsegment.  His RCA has about 30 to 40% stenosis.    Current cardiac medications include aspirin, amlodipine, Plavix, Zetia, lisinopril, Toprol-XL and Crestor    The following portions of the patient's history were reviewed and updated as appropriate: allergies, current medications, past family history, past medical history, past social history, past surgical history, and problem list.    Review of Systems   Constitutional: Negative for malaise/fatigue.   Cardiovascular:  Negative for chest pain, dyspnea on exertion, leg swelling and palpitations.   Respiratory:  Negative for cough and shortness of breath.    Gastrointestinal:  Negative for abdominal pain, nausea and vomiting.   Neurological:  Negative for dizziness, focal weakness, headaches, light-headedness and numbness.   All other systems reviewed and are negative.      Current Outpatient Medications:     amLODIPine (NORVASC) 5 MG tablet, Take 1 tablet by mouth once daily, Disp: 90 tablet,  Rfl: 1    aspirin 81 MG EC tablet, 1 tablet Daily., Disp: , Rfl:     clopidogrel (PLAVIX) 75 MG tablet, Take 1 tablet by mouth once daily, Disp: 90 tablet, Rfl: 3    diphenhydrAMINE-acetaminophen (TYLENOL PM)  MG tablet per tablet, Take 2 tablets by mouth At Night As Needed., Disp: , Rfl:     ezetimibe (ZETIA) 10 MG tablet, Take 1 tablet by mouth Daily., Disp: 90 tablet, Rfl: 3    famotidine (PEPCID) 20 MG tablet, Take 1 tablet by mouth 2 (Two) Times a Day., Disp: , Rfl:     fluticasone (FLONASE) 50 MCG/ACT nasal spray, 2 sprays into the nostril(s) as directed by provider Daily As Needed for Rhinitis., Disp: , Rfl:     isosorbide mononitrate (IMDUR) 30 MG 24 hr tablet, Take 1 tablet by mouth Every 12 (Twelve) Hours., Disp: 180 tablet, Rfl: 3    lisinopril (PRINIVIL,ZESTRIL) 5 MG tablet, Take 1 tablet by mouth once daily, Disp: 90 tablet, Rfl: 3    metoprolol succinate XL (TOPROL-XL) 25 MG 24 hr tablet, Take 0.5 tablets by mouth Daily., Disp: 90 tablet, Rfl: 3    nitroglycerin (NITROSTAT) 0.4 MG SL tablet, 1 under the tongue as needed for angina, may repeat q5mins for up three doses, Disp: 100 tablet, Rfl: 11    rosuvastatin (CRESTOR) 40 MG tablet, TAKE 1 TABLET BY MOUTH ONCE DAILY AT NIGHT, Disp: 90 tablet, Rfl: 3    Current outpatient and discharge medications have been reconciled for the patient.  Reviewed by: Maximino Lynn MD       Allergies   Allergen Reactions    Adhesive Tape Itching       Family History   Problem Relation Age of Onset    Parkinsonism Mother     Heart disease Father     Valvular heart disease Father     Pancreatic cancer Sister 85    Dementia Sister     Dementia Sister        Past Surgical History:   Procedure Laterality Date    CARDIAC CATHETERIZATION  01/2007    TIA: proximal LAD    CARDIAC CATHETERIZATION N/A 07/09/2022    Procedure: Left Heart Cath possible PCI, atherectomy, hemodynamic support;  Surgeon: John Perez MD;  Location: [facility] INVASIVE LOCATION;  Service:  Cardiovascular;  Laterality: N/A;    COLONOSCOPY  07/2023    COLONOSCOPY N/A 06/27/2023    Procedure: COLONOSCOPY with terminal ileum biopsy's and cold snare polypectomy x 10;  Surgeon: Neymar Watson MD;  Location: [facility] ENDOSCOPY;  Service: Gastroenterology;  Laterality: N/A;  diverticulosis, internal hemorrhoids    HERNIA REPAIR      TOTAL HIP ARTHROPLASTY Left        Past Medical History:   Diagnosis Date    CHF (congestive heart failure)     Coronary artery disease     GERD (gastroesophageal reflux disease)     Hyperlipidemia     Hypertension     Myocardial infarction        Family History   Problem Relation Age of Onset    Parkinsonism Mother     Heart disease Father     Valvular heart disease Father     Pancreatic cancer Sister 85    Dementia Sister     Dementia Sister        Social History     Socioeconomic History    Marital status:    Tobacco Use    Smoking status: Never     Passive exposure: Never    Smokeless tobacco: Never   Vaping Use    Vaping status: Never Used   Substance and Sexual Activity    Alcohol use: No    Drug use: No    Sexual activity: Defer               Objective:       Physical Exam    There were no vitals taken for this visit.  The patient is alert, oriented and in no distress.    Vital signs as noted above.    Head and neck revealed no carotid bruits or jugular venous distension.  No thyromegaly or lymphadenopathy is present.    Lungs clear.  No wheezing.  Breath sounds are normal bilaterally.    Heart normal first and second heart sounds.  No murmur..  No pericardial rub is present.  No gallop is present.    Abdomen soft and nontender.  No organomegaly is present.    Extremities revealed good peripheral pulses without any pedal edema.    Skin warm and dry.    Musculoskeletal system is grossly normal.    CNS grossly normal.          No diagnosis found.LAB RESULTS (LAST 7 DAYS)    CBC        BMP        CMP         BNP        TROPONIN        CoAg        Creatinine  Clearance  CrCl cannot be calculated (Unknown ideal weight.).    ABG        Radiology  No radiology results for the last day    EKG  Procedures    Stress test  Results for orders placed during the hospital encounter of 03/04/20    Stress Test With Myocardial Perfusion One Day    Interpretation Summary  · Findings consistent with a normal ECG stress test.  · Left ventricular ejection fraction is normal (Calculated EF = 59%).  · Impressions are consistent with a low risk study.  · Small fixed distal inferolateral defect without any ischemia EF 60%.    No changes in EKG with lexiscan.  Correlation with myocardial perfusion images recommended      Echocardiogram      Cardiac catheterization  Results for orders placed during the hospital encounter of 07/08/22    Cardiac Catheterization/Vascular Study    Narrative  CARDIAC CATHETERIZATION REPORT      DATE OF PROCEDURE:  7/9/2022    INDICATION FOR PROCEDURE:    Chest pain  CAD  S/p coronary artery stenting in the past    PROCEDURE PERFORMED:    Left heart catheterization  coronary angiography  left ventriculography    PROCEDURE COMMENTS:    After informed consent was obtained, the patient was prepped and draped in the usual sterile manner.  Mild to moderate sedation was administered.  Right femoral artery was accessed without difficulty and 6 Croatian arterial sheath was inserted.  Sheath was flushed with heparinized saline.    Using 6 Croatian Godfrey catheters, first left coronary artery and the right coronary was electively engaged and appropriate views were taken.  A 6 Croatian JR4 catheter was used to cross aortic valve and left heart catheterization was performed.  Left ventriculography was done in GARRETT view    Patient tolerated the procedure well.    FINDINGS:    1. HEMODYNAMICS:    Aortic pressure: 97/62 mmHg    LVEDP: 0 to 5 mmHg    Gradient across aortic valve on pullback: No gradient across aortic valve      2. LEFT VENTRICULOGRAPHY: 55 to 60%      3. CORONARY  ANGIOGRAPHY:    A: Left main coronary artery: Short and normal    B: Left anterior descending artery: 25 to 30% in the mid segment of LAD.  D3 branch of LAD has ostial disease up to 25%.  D2 branch of LAD has mild disease but it is a small caliber vessel    C: Left circumflex coronary artery: LCx has 25 to 30% stenosis in the midsegment no high-grade stenosis    D: Right coronary artery: RCA is large and dominant and has 30 to 40% stenosis in mid to distal segment.    SUMMARY:    Nonobstructive CAD  Preserved left ventricle    RECOMMENDATIONS:    Medical treatment          Assessment and Plan       There are no diagnoses linked to this encounter.     Coronary artery disease  Previous PCI to the LAD with slow flow in the LAD  Continue medical management with amlodipine and Imdur 30 mg along   Change metoprolol to succinate  Continue dual antiplatelet therapy, high intensity statin and beta-blocker  No bleeding issues  No further episodes of chest pain     Hypertension  Heart rate and blood pressure well-controlled     Hyperlipidemia  On rosuvastatin 40 mg  Last LDL was 70 and at goal  Repeat lipid panel now     Recently diagnosed lymphoma  In remission     Dyspnea on exertion  Echocardiogram shows preserved LV function, grade 1 diastolic dysfunction and no significant valvular abnormalities.  RVSP 38 mmHg.    Obesity  BMI is 34.  He weighs 246 pounds.  Lifestyle modifications recommended.  Screening and treatment for sleep apnea recommended.

## 2024-09-10 ENCOUNTER — OFFICE VISIT (OUTPATIENT)
Dept: CARDIOLOGY | Facility: CLINIC | Age: 79
End: 2024-09-10
Payer: MEDICARE

## 2024-09-10 VITALS
HEART RATE: 59 BPM | SYSTOLIC BLOOD PRESSURE: 123 MMHG | OXYGEN SATURATION: 98 % | BODY MASS INDEX: 33.32 KG/M2 | DIASTOLIC BLOOD PRESSURE: 70 MMHG | HEIGHT: 72 IN | WEIGHT: 246 LBS

## 2024-09-10 DIAGNOSIS — C85.90 LYMPHOMA, UNSPECIFIED BODY REGION, UNSPECIFIED LYMPHOMA TYPE: ICD-10-CM

## 2024-09-10 DIAGNOSIS — R06.09 DYSPNEA ON EXERTION: ICD-10-CM

## 2024-09-10 DIAGNOSIS — I25.10 CORONARY ARTERY DISEASE INVOLVING NATIVE CORONARY ARTERY OF NATIVE HEART WITHOUT ANGINA PECTORIS: Primary | ICD-10-CM

## 2024-09-10 DIAGNOSIS — E78.2 MIXED HYPERLIPIDEMIA: Chronic | ICD-10-CM

## 2024-09-10 DIAGNOSIS — I10 PRIMARY HYPERTENSION: ICD-10-CM

## 2024-09-10 RX ORDER — AMLODIPINE BESYLATE 5 MG/1
5 TABLET ORAL DAILY
Qty: 90 TABLET | Refills: 3 | Status: SHIPPED | OUTPATIENT
Start: 2024-09-10

## 2024-09-30 RX ORDER — ROSUVASTATIN CALCIUM 40 MG/1
40 TABLET, COATED ORAL NIGHTLY
Qty: 90 TABLET | Refills: 3 | Status: SHIPPED | OUTPATIENT
Start: 2024-09-30

## 2024-10-21 ENCOUNTER — OFFICE VISIT (OUTPATIENT)
Dept: FAMILY MEDICINE CLINIC | Facility: CLINIC | Age: 79
End: 2024-10-21
Payer: MEDICARE

## 2024-10-21 VITALS
HEIGHT: 72 IN | SYSTOLIC BLOOD PRESSURE: 136 MMHG | BODY MASS INDEX: 33.32 KG/M2 | HEART RATE: 58 BPM | TEMPERATURE: 97.3 F | WEIGHT: 246 LBS | DIASTOLIC BLOOD PRESSURE: 81 MMHG | OXYGEN SATURATION: 97 %

## 2024-10-21 DIAGNOSIS — Z12.5 SCREENING PSA (PROSTATE SPECIFIC ANTIGEN): ICD-10-CM

## 2024-10-21 DIAGNOSIS — E78.2 MIXED HYPERLIPIDEMIA: Primary | Chronic | ICD-10-CM

## 2024-10-21 PROCEDURE — 99213 OFFICE O/P EST LOW 20 MIN: CPT | Performed by: NURSE PRACTITIONER

## 2024-10-21 PROCEDURE — 3075F SYST BP GE 130 - 139MM HG: CPT | Performed by: NURSE PRACTITIONER

## 2024-10-21 PROCEDURE — 3079F DIAST BP 80-89 MM HG: CPT | Performed by: NURSE PRACTITIONER

## 2024-10-21 PROCEDURE — 1126F AMNT PAIN NOTED NONE PRSNT: CPT | Performed by: NURSE PRACTITIONER

## 2024-10-21 NOTE — PATIENT INSTRUCTIONS
Fasting blood work  Get your hearing checked  Keep appointments with oncology and cardiology  Follow-up 6 months

## 2024-10-21 NOTE — PROGRESS NOTES
"    Donald Navarro is a 79 y.o. male.     History of Present Illness  79-year-old white male with history of CAD/MI/stents, hypertension, hyperlipidemia, arthritis, spinal stenosis, lumbar DDD, BPH and non-Hodgkin's B-cell lymphoma in remission who comes in today to be reestablished as a patient    Blood pressure 136/80 heart rate 58 he denies any chest pain, dyspnea, tachycardia or dizziness    We did have lengthy discussion on hearing loss and hearing aids.  Patient plans on getting his hearing checked he is having trouble with his hearing    He does see oncology still but supposedly is still in remission.  He also sees cardiology on a regular basis.  He does have lab work scheduled with oncology ongoing to add a PSA and a lipid panel to that    Weight is 246 with a BMI of 33.4.  He has had 4 COVID vaccines he is up-to-date on eye exam.  Will be doing a PSA today.  He had a recent colonoscopy in May 2023 which is how they found his lymphoma and he is not sure when oncology plans on redoing that            Fasting blood work  Get your hearing checked  Keep appointments with oncology and cardiology  Follow-up 6 months       The following portions of the patient's history were reviewed and updated as appropriate: allergies, current medications, past family history, past medical history, past social history, past surgical history, and problem list.    Vitals:    10/21/24 1116   BP: 136/81   Pulse: 58   Temp: 97.3 °F (36.3 °C)   TempSrc: Infrared   SpO2: 97%   Weight: 112 kg (246 lb)   Height: 182.9 cm (72.01\")     Body mass index is 33.36 kg/m².  BMI is >= 30 and <35. (Class 1 Obesity). The following options were offered after discussion;: weight loss educational material (shared in after visit summary)       Past Medical History:   Diagnosis Date    CHF (congestive heart failure)     Coronary artery disease     GERD (gastroesophageal reflux disease)     Hyperlipidemia     Hypertension     Myocardial infarction  "     Past Surgical History:   Procedure Laterality Date    CARDIAC CATHETERIZATION  01/2007    TIA: proximal LAD    CARDIAC CATHETERIZATION N/A 07/09/2022    Procedure: Left Heart Cath possible PCI, atherectomy, hemodynamic support;  Surgeon: John Perez MD;  Location: [facility] CATH INVASIVE LOCATION;  Service: Cardiovascular;  Laterality: N/A;    COLONOSCOPY  07/2023    COLONOSCOPY N/A 06/27/2023    Procedure: COLONOSCOPY with terminal ileum biopsy's and cold snare polypectomy x 10;  Surgeon: Neymar Watson MD;  Location: [facility] ENDOSCOPY;  Service: Gastroenterology;  Laterality: N/A;  diverticulosis, internal hemorrhoids    HERNIA REPAIR      TOTAL HIP ARTHROPLASTY Left      Family History   Problem Relation Age of Onset    Parkinsonism Mother     Heart disease Father     Valvular heart disease Father     Pancreatic cancer Sister 85    Dementia Sister     Dementia Sister      Immunization History   Administered Date(s) Administered    Fluad Quad 65+ 10/18/2019, 09/17/2020    Fluzone High-Dose 65+YRS 10/09/2015, 10/17/2016, 10/11/2017, 09/24/2018    Pneumococcal Conjugate 13-Valent (PCV13) 10/26/2016    Pneumococcal Polysaccharide (PPSV23) 11/25/2019       Orders Only on 08/27/2024   Component Date Value Ref Range Status    WBC 08/27/2024 5.9  3.4 - 10.8 x10E3/uL Final    RBC 08/27/2024 5.23  4.14 - 5.80 x10E6/uL Final    Hemoglobin 08/27/2024 15.7  13.0 - 17.7 g/dL Final    Hematocrit 08/27/2024 49.1  37.5 - 51.0 % Final    MCV 08/27/2024 94  79 - 97 fL Final    MCH 08/27/2024 30.0  26.6 - 33.0 pg Final    MCHC 08/27/2024 32.0  31.5 - 35.7 g/dL Final    RDW 08/27/2024 13.3  11.6 - 15.4 % Final    Platelets 08/27/2024 174  150 - 450 x10E3/uL Final    Neutrophil Rel % 08/27/2024 58  Not Estab. % Final    Lymphocyte Rel % 08/27/2024 19  Not Estab. % Final    Monocyte Rel % 08/27/2024 14  Not Estab. % Final    Eosinophil Rel % 08/27/2024 8  Not Estab. % Final    Basophil Rel % 08/27/2024 1  Not Estab. % Final     Neutrophils Absolute 08/27/2024 3.4  1.4 - 7.0 x10E3/uL Final    Lymphocytes Absolute 08/27/2024 1.1  0.7 - 3.1 x10E3/uL Final    Monocytes Absolute 08/27/2024 0.8  0.1 - 0.9 x10E3/uL Final    Eosinophils Absolute 08/27/2024 0.5 (H)  0.0 - 0.4 x10E3/uL Final    Basophils Absolute 08/27/2024 0.1  0.0 - 0.2 x10E3/uL Final    Immature Granulocyte Rel % 08/27/2024 0  Not Estab. % Final    Immature Grans Absolute 08/27/2024 0.0  0.0 - 0.1 x10E3/uL Final    Glucose 08/27/2024 114 (H)  70 - 99 mg/dL Final    BUN 08/27/2024 11  8 - 27 mg/dL Final    Creatinine 08/27/2024 0.98  0.76 - 1.27 mg/dL Final    EGFR Result 08/27/2024 79  >59 mL/min/1.73 Final    BUN/Creatinine Ratio 08/27/2024 11  10 - 24 Final    Sodium 08/27/2024 141  134 - 144 mmol/L Final    Potassium 08/27/2024 4.7  3.5 - 5.2 mmol/L Final    Chloride 08/27/2024 107 (H)  96 - 106 mmol/L Final    Total CO2 08/27/2024 22  20 - 29 mmol/L Final    Calcium 08/27/2024 8.8  8.6 - 10.2 mg/dL Final    Total Protein 08/27/2024 6.0  6.0 - 8.5 g/dL Final    Albumin 08/27/2024 3.8  3.8 - 4.8 g/dL Final    Globulin 08/27/2024 2.2  1.5 - 4.5 g/dL Final    Total Bilirubin 08/27/2024 0.6  0.0 - 1.2 mg/dL Final    Alkaline Phosphatase 08/27/2024 64  44 - 121 IU/L Final    AST (SGOT) 08/27/2024 33  0 - 40 IU/L Final    ALT (SGPT) 08/27/2024 28  0 - 44 IU/L Final    Ambjordan Abblindav CMP14 Default 08/27/2024 Comment   Final    Comment: A hand-written panel/profile was received from your office. In  accordance with the [facility] Ambiguous Test Code Policy dated July 2003, we have completed your order by using the closest currently  or formerly recognized AMA panel.  We have assigned Comprehensive  Metabolic Panel (14), Test Code #130045 to this request.  If this  is not the testing you wished to receive on this specimen, please  contact the [facility]ical Services Department  to clarify the test order.  We appreciate your business.           Review of  Systems    Objective   Physical Exam    Procedures    Assessment & Plan   Diagnoses and all orders for this visit:    1. Mixed hyperlipidemia (Primary)  -     Lipid Panel With LDL / HDL Ratio; Future    2. Screening PSA (prostate specific antigen)  -     PSA Screen; Future           Current Outpatient Medications:     amLODIPine (NORVASC) 5 MG tablet, Take 1 tablet by mouth Daily., Disp: 90 tablet, Rfl: 3    aspirin 81 MG EC tablet, 1 tablet Daily., Disp: , Rfl:     clopidogrel (PLAVIX) 75 MG tablet, Take 1 tablet by mouth once daily, Disp: 90 tablet, Rfl: 3    diphenhydrAMINE-acetaminophen (TYLENOL PM)  MG tablet per tablet, Take 2 tablets by mouth At Night As Needed., Disp: , Rfl:     ezetimibe (ZETIA) 10 MG tablet, Take 1 tablet by mouth Daily., Disp: 90 tablet, Rfl: 3    famotidine (PEPCID) 20 MG tablet, Take 1 tablet by mouth 2 (Two) Times a Day., Disp: , Rfl:     fluticasone (FLONASE) 50 MCG/ACT nasal spray, Administer 2 sprays into the nostril(s) as directed by provider Daily As Needed for Rhinitis., Disp: , Rfl:     isosorbide mononitrate (IMDUR) 30 MG 24 hr tablet, Take 1 tablet by mouth Every 12 (Twelve) Hours., Disp: 180 tablet, Rfl: 3    lisinopril (PRINIVIL,ZESTRIL) 5 MG tablet, Take 1 tablet by mouth once daily, Disp: 90 tablet, Rfl: 3    metoprolol succinate XL (TOPROL-XL) 25 MG 24 hr tablet, Take 0.5 tablets by mouth Daily., Disp: 90 tablet, Rfl: 3    nitroglycerin (NITROSTAT) 0.4 MG SL tablet, 1 under the tongue as needed for angina, may repeat q5mins for up three doses, Disp: 100 tablet, Rfl: 11    rosuvastatin (CRESTOR) 40 MG tablet, Take 1 tablet by mouth Every Night., Disp: 90 tablet, Rfl: 3           Rama Quintanilla, CHEMO 10/21/2024 11:36 EDT  This note has been electronically signed

## 2024-10-23 LAB
CHOLEST SERPL-MCNC: 110 MG/DL (ref 100–199)
HDLC SERPL-MCNC: 30 MG/DL
LDLC SERPL CALC-MCNC: 55 MG/DL (ref 0–99)
LDLC/HDLC SERPL: 1.8 RATIO (ref 0–3.6)
PSA SERPL-MCNC: 0.2 NG/ML (ref 0–4)
TRIGL SERPL-MCNC: 139 MG/DL (ref 0–149)
VLDLC SERPL CALC-MCNC: 25 MG/DL (ref 5–40)

## 2024-10-28 ENCOUNTER — TELEPHONE (OUTPATIENT)
Dept: ONCOLOGY | Facility: CLINIC | Age: 79
End: 2024-10-28
Payer: MEDICARE

## 2024-10-28 NOTE — TELEPHONE ENCOUNTER
Caller: Donald Navarro    Relationship: Self    Best call back number: [phone]    What is the best time to reach you: ANYTIME    Who are you requesting to speak with (clinical staff, provider,  specific staff member): CLINICAL    What was the call regarding: PT HAD HIS LABS DONE ON 10-22 AND THEY HAVE NOT SHOWN IN HIS MYCHART, HE IS SCHEDULED TOMORROW AND WANTS TO KNOW IF THE OFFICE HAS RECEIVED HIS RESULTS

## 2024-10-28 NOTE — TELEPHONE ENCOUNTER
Contacted the patient regarding his voicemail we received. I informed him that his labs for Dr. Mcgarry were not drawn on 10/22/24 when his labs for Rama Quintanilla NP were drawn. Patient stated he was informed that the labs were drawn therefore he is needing to know what he should do now. I informed him that if he were to go today to get his labs drawn, there is a chance that we will have the results by tomorrow. Patient stated he will contact the PCP office to verify if they are able to get him in for labs today. I v/u. No further questions at this time.

## 2024-10-29 ENCOUNTER — OFFICE VISIT (OUTPATIENT)
Dept: ONCOLOGY | Facility: CLINIC | Age: 79
End: 2024-10-29
Payer: MEDICARE

## 2024-10-29 VITALS
HEART RATE: 59 BPM | OXYGEN SATURATION: 96 % | BODY MASS INDEX: 33.46 KG/M2 | WEIGHT: 247 LBS | TEMPERATURE: 97.8 F | HEIGHT: 72 IN

## 2024-10-29 DIAGNOSIS — C85.10 B-CELL LYMPHOMA, UNSPECIFIED B-CELL LYMPHOMA TYPE, UNSPECIFIED BODY REGION: Primary | ICD-10-CM

## 2024-10-29 PROBLEM — H52.4 PRESBYOPIA: Status: ACTIVE | Noted: 2022-05-24

## 2024-10-29 PROBLEM — H25.813 COMBINED FORMS OF AGE-RELATED CATARACT OF BOTH EYES: Status: ACTIVE | Noted: 2022-05-24

## 2024-10-29 PROBLEM — H43.399 VITREOUS FLOATERS: Status: ACTIVE | Noted: 2022-05-24

## 2024-10-29 PROBLEM — H52.03 HYPEROPIA OF BOTH EYES: Status: ACTIVE | Noted: 2022-05-24

## 2024-10-29 PROBLEM — H52.221 REGULAR ASTIGMATISM OF RIGHT EYE: Status: ACTIVE | Noted: 2022-05-24

## 2024-10-29 LAB
ALBUMIN SERPL-MCNC: 3.8 G/DL (ref 3.8–4.8)
ALP SERPL-CCNC: 71 IU/L (ref 44–121)
ALT SERPL-CCNC: 37 IU/L (ref 0–44)
AMBIG ABBREV CMP14 DEFAULT: NORMAL
AST SERPL-CCNC: 44 IU/L (ref 0–40)
BASOPHILS # BLD AUTO: 0.1 X10E3/UL (ref 0–0.2)
BASOPHILS NFR BLD AUTO: 2 %
BILIRUB SERPL-MCNC: 0.6 MG/DL (ref 0–1.2)
BUN SERPL-MCNC: 14 MG/DL (ref 8–27)
BUN/CREAT SERPL: 16 (ref 10–24)
CALCIUM SERPL-MCNC: 8.8 MG/DL (ref 8.6–10.2)
CHLORIDE SERPL-SCNC: 109 MMOL/L (ref 96–106)
CO2 SERPL-SCNC: 20 MMOL/L (ref 20–29)
CREAT SERPL-MCNC: 0.9 MG/DL (ref 0.76–1.27)
EGFRCR SERPLBLD CKD-EPI 2021: 88 ML/MIN/1.73
EOSINOPHIL # BLD AUTO: 0.5 X10E3/UL (ref 0–0.4)
EOSINOPHIL NFR BLD AUTO: 8 %
ERYTHROCYTE [DISTWIDTH] IN BLOOD BY AUTOMATED COUNT: 13.3 % (ref 11.6–15.4)
GLOBULIN SER CALC-MCNC: 2.4 G/DL (ref 1.5–4.5)
GLUCOSE SERPL-MCNC: 127 MG/DL (ref 70–99)
HCT VFR BLD AUTO: 48.6 % (ref 37.5–51)
HGB BLD-MCNC: 15.8 G/DL (ref 13–17.7)
IMM GRANULOCYTES # BLD AUTO: 0 X10E3/UL (ref 0–0.1)
IMM GRANULOCYTES NFR BLD AUTO: 0 %
LYMPHOCYTES # BLD AUTO: 1.4 X10E3/UL (ref 0.7–3.1)
LYMPHOCYTES NFR BLD AUTO: 24 %
MCH RBC QN AUTO: 30.2 PG (ref 26.6–33)
MCHC RBC AUTO-ENTMCNC: 32.5 G/DL (ref 31.5–35.7)
MCV RBC AUTO: 93 FL (ref 79–97)
MONOCYTES # BLD AUTO: 0.8 X10E3/UL (ref 0.1–0.9)
MONOCYTES NFR BLD AUTO: 14 %
NEUTROPHILS # BLD AUTO: 3.2 X10E3/UL (ref 1.4–7)
NEUTROPHILS NFR BLD AUTO: 52 %
PLATELET # BLD AUTO: 170 X10E3/UL (ref 150–450)
POTASSIUM SERPL-SCNC: 4.9 MMOL/L (ref 3.5–5.2)
PROT SERPL-MCNC: 6.2 G/DL (ref 6–8.5)
RBC # BLD AUTO: 5.24 X10E6/UL (ref 4.14–5.8)
SODIUM SERPL-SCNC: 141 MMOL/L (ref 134–144)
WBC # BLD AUTO: 6 X10E3/UL (ref 3.4–10.8)

## 2024-10-29 NOTE — PROGRESS NOTES
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     History of Present Illness:  Patient is a 79 y.o.     2024: In the office completely asymptomatic to transfer his care from [facility]. He first was seen in late 2023 for a screening colonoscopy. Linear furrows throughout the terminal ileum were felt to be abnormal. He also had colonic polyps, diverticulosis and hemorrhoids in the colon. The biopsies confirmed tubular adenomas and hyperplastic polyps. In addition there was an atypical mixed B cell infiltrate. B cell re-arrangement was performed. Monoclonal B cells were confirmed. This was reported to be consistent with extranodal marginal zone lymphoma. He was entirely asymptomatic. Imaging reported no abnormalities in the abdomen. In the mediastinum there were enlarged mediastinal and hilar lymph nodes, which were partially calcified, similar to a previous scan; this included a 2.5 X 1.6 cm right paratracheal node and a 1.9 X 1.3 cm right infrahilar node. In early  he had undergone a biopsy that had reported non-caseating granulomata that was consistent with sarcoidosis. The PET scan reported bilateral hilar and mediastinal adenopathy that were fluorodeoxyglucose avid. Otherwise there were no areas of abnormal tracer accumulation. Bone marrow biopsy was negative. A decision was made then to treat him with rituximab. He received the first of 4 doses on 10/28/2023 and completed a fourth dose on 11/3/2023. He had fever of up to 101.2 after the rituximab injections. A follow up PET scan only a few days later, showed no change. Today he tells me he remains perfectly asymptomatic. He has maintained a stable weight and has been afebrile. He has been without nocturnal  diaphoresis. He has maintained a good appetite and is generally energetic. He has been eating well and denies nausea or vomiting. Has not had any chest pain or cough and has not had diarrhea or dysuria. No edema. On exam no palpable adenopathy. The laboratory exams were reviewed. I'm not sure he needed any specific treatment and at this time he seems to be doing well and is likely to continue to do well without intervention. He is to have scans and laboratory exams and will see me with new scans.     2/27/2024: Entirely asymptomatic.  Afebrile, without unintended weight loss and without nocturnal diaphoresis.  No abdominal pain.  Eating well and without nausea or vomiting.  On exam no palpable adenopathy in the neck, supraclavicular regions, axillary spaces wearing wider spaces.  The abdomen is rounded and soft.  Not tender.  The liver and spleen do not seem enlarged and I cannot palpate any tumors.  The laboratory exams were reviewed.  The blood count is well within the normal range.  Chemistry is also adequate.  To see me again in 4 months.  Consider scans in 6 months.  Consider a colonoscopy sometime in 2025.  Discussed with him at length.    6/25/2024: Again without new symptoms.  As active as usual and without new limitations.  Maintains a good appetite and has had no nausea or vomiting.  No chest pains or cough and no abdominal pain, diarrhea or dysuria.  On exam alert and conversant.  Well-oriented and in no distress.  No jaundice and not pale.  The lungs are clear and the heart regular.  Abdomen is soft.  There is no edema.  Laboratory exams again reveal moderate thrombocytopenia.  Will continue to monitor without intervention at this time.  He is to see me in approximately 4 months but will have a platelet count checked in approximately 2 months.  Discussed with him.    10/29/2024: Feels just as well as at the time of the last visit.  No fevers or nocturnal diaphoresis.  No chest pains or cough and no  abdominal pain.  He is weight has remained stable.  On exam alert and conversant.  No distress.  No jaundice.  No pallor.  Lungs are clear bilaterally and heart is regular.  Abdomen is protuberant but soft.  Liver and spleen not enlarged.  No edema.  Laboratory exams reviewed.  His blood count is well within the normal range.  No intervention at this time.  See me in approximately 6 months.    Past Medical History:   Diagnosis Date    CHF (congestive heart failure)     Coronary artery disease     GERD (gastroesophageal reflux disease)     Hyperlipidemia     Hypertension     Myocardial infarction      Past Surgical History:   Procedure Laterality Date    CARDIAC CATHETERIZATION  01/2007    TIA: proximal LAD    CARDIAC CATHETERIZATION N/A 07/09/2022    Procedure: Left Heart Cath possible PCI, atherectomy, hemodynamic support;  Surgeon: John Perez MD;  Location: [facility] CATH INVASIVE LOCATION;  Service: Cardiovascular;  Laterality: N/A;    COLONOSCOPY  07/2023    COLONOSCOPY N/A 06/27/2023    Procedure: COLONOSCOPY with terminal ileum biopsy's and cold snare polypectomy x 10;  Surgeon: Neymar Watson MD;  Location: [facility] ENDOSCOPY;  Service: Gastroenterology;  Laterality: N/A;  diverticulosis, internal hemorrhoids    HERNIA REPAIR      TOTAL HIP ARTHROPLASTY Left        Current Outpatient Medications:     amLODIPine (NORVASC) 5 MG tablet, Take 1 tablet by mouth Daily., Disp: 90 tablet, Rfl: 3    aspirin 81 MG EC tablet, 1 tablet Daily., Disp: , Rfl:     clopidogrel (PLAVIX) 75 MG tablet, Take 1 tablet by mouth once daily, Disp: 90 tablet, Rfl: 3    diphenhydrAMINE-acetaminophen (TYLENOL PM)  MG tablet per tablet, Take 2 tablets by mouth At Night As Needed., Disp: , Rfl:     ezetimibe (ZETIA) 10 MG tablet, Take 1 tablet by mouth Daily., Disp: 90 tablet, Rfl: 3    famotidine (PEPCID) 20 MG tablet, Take 1 tablet by mouth 2 (Two) Times a Day., Disp: , Rfl:     fluticasone (FLONASE) 50 MCG/ACT nasal spray,  Administer 2 sprays into the nostril(s) as directed by provider Daily As Needed for Rhinitis., Disp: , Rfl:     isosorbide mononitrate (IMDUR) 30 MG 24 hr tablet, Take 1 tablet by mouth Every 12 (Twelve) Hours., Disp: 180 tablet, Rfl: 3    lisinopril (PRINIVIL,ZESTRIL) 5 MG tablet, Take 1 tablet by mouth once daily, Disp: 90 tablet, Rfl: 3    metoprolol succinate XL (TOPROL-XL) 25 MG 24 hr tablet, Take 0.5 tablets by mouth Daily., Disp: 90 tablet, Rfl: 3    nitroglycerin (NITROSTAT) 0.4 MG SL tablet, 1 under the tongue as needed for angina, may repeat q5mins for up three doses, Disp: 100 tablet, Rfl: 11    rosuvastatin (CRESTOR) 40 MG tablet, Take 1 tablet by mouth Every Night., Disp: 90 tablet, Rfl: 3    Allergies   Allergen Reactions    Adhesive Tape Itching     Family History   Problem Relation Age of Onset    Parkinsonism Mother     Heart disease Father     Valvular heart disease Father     Pancreatic cancer Sister 85    Dementia Sister     Dementia Sister      Cancer-related family history includes Pancreatic cancer (age of onset: 85) in his sister.    Social History     Tobacco Use    Smoking status: Never     Passive exposure: Never    Smokeless tobacco: Never   Vaping Use    Vaping status: Never Used   Substance Use Topics    Alcohol use: No    Drug use: No     Social History     Social History Narrative    Not on file     ROS:   Review of Systems   Constitutional:  Negative for activity change, appetite change, chills, diaphoresis, fatigue, fever and unexpected weight change.   HENT:  Negative for congestion, dental problem, drooling, ear discharge, ear pain, facial swelling, hearing loss, mouth sores, nosebleeds, postnasal drip, rhinorrhea, sinus pressure, sinus pain, sneezing, sore throat, tinnitus, trouble swallowing and voice change.    Eyes:  Negative for photophobia, pain, discharge, redness, itching and visual disturbance.   Respiratory:  Negative for apnea, cough, choking, chest tightness, shortness  "of breath, wheezing and stridor.    Cardiovascular:  Negative for chest pain, palpitations and leg swelling.   Gastrointestinal:  Negative for abdominal distention, abdominal pain, anal bleeding, blood in stool, constipation, diarrhea, nausea, rectal pain and vomiting.   Endocrine: Negative for cold intolerance, heat intolerance, polydipsia and polyuria.   Genitourinary:  Negative for decreased urine volume, difficulty urinating, dysuria, flank pain, frequency, genital sores, hematuria and urgency.   Musculoskeletal:  Negative for arthralgias, back pain, gait problem, joint swelling, myalgias, neck pain and neck stiffness.   Skin:  Negative for color change, pallor and rash.   Neurological:  Negative for dizziness, tremors, seizures, syncope, facial asymmetry, speech difficulty, weakness, light-headedness, numbness and headaches.   Hematological:  Negative for adenopathy. Does not bruise/bleed easily.   Psychiatric/Behavioral:  Negative for agitation, behavioral problems, confusion, decreased concentration, hallucinations, self-injury, sleep disturbance and suicidal ideas. The patient is not nervous/anxious.      Objective:    Vital Signs:  Vitals:    10/29/24 1057   Weight: 112 kg (247 lb)   Height: 182.9 cm (72\")   PainSc: 0-No pain     Body mass index is 33.5 kg/m².    ECOG  (0) Fully active, able to carry on all predisease performance without restriction    Physical Exam:   Physical Exam  Constitutional:       General: He is not in acute distress.     Appearance: He is not ill-appearing, toxic-appearing or diaphoretic.   HENT:      Head: Normocephalic and atraumatic.      Right Ear: External ear normal.      Left Ear: External ear normal.      Nose: Nose normal.      Mouth/Throat:      Mouth: Mucous membranes are moist.      Pharynx: Oropharynx is clear.   Eyes:      General: No scleral icterus.        Right eye: No discharge.         Left eye: No discharge.      Conjunctiva/sclera: Conjunctivae normal.      " Pupils: Pupils are equal, round, and reactive to light.   Cardiovascular:      Rate and Rhythm: Normal rate and regular rhythm.      Pulses: Normal pulses.      Heart sounds: Normal heart sounds. No murmur heard.     No friction rub. No gallop.   Pulmonary:      Effort: No respiratory distress.      Breath sounds: No stridor. No wheezing, rhonchi or rales.   Chest:      Chest wall: No tenderness.   Abdominal:      General: Abdomen is flat. Bowel sounds are normal. There is no distension.      Palpations: Abdomen is soft. There is no mass.      Tenderness: There is no abdominal tenderness. There is no right CVA tenderness, left CVA tenderness, guarding or rebound.   Musculoskeletal:         General: No swelling, tenderness, deformity or signs of injury.      Cervical back: No rigidity.      Right lower leg: No edema.      Left lower leg: No edema.   Lymphadenopathy:      Cervical: No cervical adenopathy.   Skin:     General: Skin is warm and dry.      Coloration: Skin is not jaundiced.      Findings: No bruising or rash.   Neurological:      General: No focal deficit present.      Mental Status: He is alert and oriented to person, place, and time.      Gait: Gait normal.   Psychiatric:         Mood and Affect: Mood normal.         Behavior: Behavior normal.         Thought Content: Thought content normal.         Judgment: Judgment normal.   MESHA Mcgarry MD performed the physical exam on 10/29/2024 as documented above.    Lab Results - Last 18 Months   Lab Units 08/27/24  0804 06/18/24  0814 01/16/24  1206   WBC x10E3/uL 5.9 5.3 5.9   HEMOGLOBIN g/dL 15.7 15.0 16.5   HEMATOCRIT % 49.1 46.4 49.4   PLATELETS x10E3/uL 174 145* 181   MCV fL 94 93 88     Lab Results   Component Value Date    GLUCOSE 114 (H) 08/27/2024    BUN 11 08/27/2024    CREATININE 0.98 08/27/2024    EGFRIFNONA 67 02/01/2021    EGFRIFAFRI 77 02/01/2021    BCR 11 08/27/2024    K 4.7 08/27/2024    CO2 22 08/27/2024    CALCIUM 8.8 08/27/2024     PROTENTOTREF 6.0 08/27/2024    ALBUMIN 3.8 08/27/2024    LABIL2 1.9 01/16/2024    AST 33 08/27/2024    ALT 28 08/27/2024     Lab Results - Last 18 Months   Lab Units 10/12/23  0803   INR  1.15*   APTT seconds 28.3     Lab Results   Component Value Date    PTT 28.3 10/12/2023    INR 1.15 (H) 10/12/2023     Lab Results   Component Value Date    PSA 0.2 10/22/2024     Assessment & Plan     Extranodal marginal zone B cell lymphoma: Remains free of any suggestion of recurrent disease.  Thrombocytopenia: Resolved.  For now continue to follow only.  Coronary artery disease  Congestive heart failure.   Reviewed all laboratory exams including those from August and the most recent ones.  Discussed results with him.  He will see me in approximately 6 months.    Bright Mcgarry MD on 10/29/2024 at 11:28 AM.

## 2025-01-09 ENCOUNTER — TELEPHONE (OUTPATIENT)
Dept: CARDIOLOGY | Facility: CLINIC | Age: 80
End: 2025-01-09
Payer: MEDICARE

## 2025-01-09 NOTE — TELEPHONE ENCOUNTER
Spoke with the patient. He states that he is having the heart spasms again. He has not had any problems with them up until the past month. He had an episode last night that woke him up , shoulder and chest hurting so he took a nitro pill and it went away. Denies sob. States his bp has been fine and HR. This am it was 1352 81 hr- 59 and 128/78 hr 61. He isn't suppose to f/u until June. What do you recommend?

## 2025-01-09 NOTE — TELEPHONE ENCOUNTER
I know he normally sees Dr. Lynn in [place], but would he mind being seen at the [place] office by me next week?

## 2025-01-09 NOTE — TELEPHONE ENCOUNTER
"    Caller: Donald Navarro    Relationship to patient: Self    Best call back number: [phone]    Patient is needing: PT SAYS HE IS HAVING SOME \"HEART SPASMS\"  SAYS THIS IS NOT A NEW SYMPTOM BUT IT IS LASTING LONGER THAN NORMAL. PT SAYS HE TOOK A NITROGLYCERIN LAST NIGHT AND THAT SEEMED TO HELP BUT WANTS TO DISCUSS IT FURTHER.           "

## 2025-01-10 PROBLEM — I20.0 UNSTABLE ANGINA: Status: RESOLVED | Noted: 2020-03-04 | Resolved: 2025-01-10

## 2025-01-10 PROBLEM — R07.9 CHEST PAIN: Status: RESOLVED | Noted: 2019-06-17 | Resolved: 2025-01-10

## 2025-01-10 NOTE — PROGRESS NOTES
Subjective:     Encounter Date:01/13/2025        Patient ID: Donald Navarro [id number]     Chief Complaint:  chest pain     History of Present Illness:  Donald Navarro is a 79 y.o. male with a history of coronary artery disease status post PCI of the LAD, hypertension, hyperlipidemia, and obesity who presents to the office complaining of chest pain. The patient states he keeps being awoken with a severe pressure in the left side of his chest. He denies any associated symptoms, including shortness of breath, dizziness, lightheadedness or nausea. He states his blood pressure has been well controlled. He states he has been taking 1-2 sublingual nitroglycerin, which resolve his pain. He states the pain feels similar to previous cardiac chest pain that he has had in the past. He states it does not feel like his hiatal hernia or GERD. He denies a history of sleep apnea.     Current medications include aspirin, Plavix, rosuvastatin, Zetia, amlodipine, lisinopril, Toprol XL and Imdur.       Review of systems:  Review of Systems   Constitutional: Negative for malaise/fatigue.   Cardiovascular:  Positive for chest pain. Negative for dyspnea on exertion, leg swelling and palpitations.   Respiratory:  Negative for cough and shortness of breath.    Gastrointestinal:  Negative for abdominal pain, nausea and vomiting.   Neurological:  Negative for dizziness, focal weakness, headaches, light-headedness and numbness.   All other systems reviewed and are negative.        The following portions of the patient's history were reviewed and updated as appropriate: allergies, current medications, past family history, past medical history, past social history, past surgical history and problem list.    Past Medical History:  Past Medical History:   Diagnosis Date    CHF (congestive heart failure)     Coronary artery disease     GERD (gastroesophageal reflux disease)     Hyperlipidemia     Hypertension     Myocardial infarction        Past  Surgical History:  Past Surgical History:   Procedure Laterality Date    CARDIAC CATHETERIZATION  01/2007    TIA: proximal LAD    CARDIAC CATHETERIZATION N/A 07/09/2022    Procedure: Left Heart Cath possible PCI, atherectomy, hemodynamic support;  Surgeon: John Perez MD;  Location: [facility] CATH INVASIVE LOCATION;  Service: Cardiovascular;  Laterality: N/A;    COLONOSCOPY  07/2023    COLONOSCOPY N/A 06/27/2023    Procedure: COLONOSCOPY with terminal ileum biopsy's and cold snare polypectomy x 10;  Surgeon: Neymar Watson MD;  Location: [facility] ENDOSCOPY;  Service: Gastroenterology;  Laterality: N/A;  diverticulosis, internal hemorrhoids    HERNIA REPAIR      TOTAL HIP ARTHROPLASTY Left         Allergies:  Allergies   Allergen Reactions    Adhesive Tape Itching       Current Meds:     Current Outpatient Medications:     amLODIPine (NORVASC) 5 MG tablet, Take 1 tablet by mouth Daily., Disp: 90 tablet, Rfl: 3    aspirin 81 MG EC tablet, Take 1 tablet by mouth Daily. Indications: Stable Angina Pectoris, Disp: , Rfl:     clopidogrel (PLAVIX) 75 MG tablet, Take 1 tablet by mouth once daily, Disp: 90 tablet, Rfl: 3    diphenhydrAMINE-acetaminophen (TYLENOL PM)  MG tablet per tablet, Take 2 tablets by mouth At Night As Needed., Disp: , Rfl:     famotidine (PEPCID) 20 MG tablet, Take 1 tablet by mouth 2 (Two) Times a Day., Disp: , Rfl:     lisinopril (PRINIVIL,ZESTRIL) 5 MG tablet, Take 1 tablet by mouth once daily, Disp: 90 tablet, Rfl: 3    metoprolol succinate XL (TOPROL-XL) 25 MG 24 hr tablet, Take 0.5 tablets by mouth Daily., Disp: 90 tablet, Rfl: 3    nitroglycerin (NITROSTAT) 0.4 MG SL tablet, 1 under the tongue as needed for angina, may repeat q5mins for up three doses, Disp: 100 tablet, Rfl: 11    rosuvastatin (CRESTOR) 40 MG tablet, Take 1 tablet by mouth Every Night., Disp: 90 tablet, Rfl: 3    ezetimibe (ZETIA) 10 MG tablet, Take 1 tablet by mouth Daily., Disp: 90 tablet, Rfl: 3    fluticasone (FLONASE)  "50 MCG/ACT nasal spray, Administer 2 sprays into the nostril(s) as directed by provider Daily As Needed for Rhinitis., Disp: , Rfl:     isosorbide mononitrate (IMDUR) 60 MG 24 hr tablet, Take 1 tablet by mouth 2 (Two) Times a Day., Disp: 90 tablet, Rfl: 3    Social History:   Social History     Tobacco Use    Smoking status: Never     Passive exposure: Never    Smokeless tobacco: Never   Substance Use Topics    Alcohol use: No        Family History:  Family History   Problem Relation Age of Onset    Parkinsonism Mother     Heart disease Father     Valvular heart disease Father     Pancreatic cancer Sister 85    Dementia Sister     Dementia Sister                  Objective:       Physical Exam:  Vitals reviewed.   Constitutional:       Appearance: Well-developed and well-groomed. Obese.   Eyes:      Conjunctiva/sclera: Conjunctivae normal.      Pupils: Pupils are equal, round, and reactive to light.   HENT:      Head: Normocephalic and atraumatic.    Mouth/Throat:      Mouth: Mucous membranes are moist.      Pharynx: Oropharynx is clear.   Pulmonary:      Effort: Pulmonary effort is normal.      Breath sounds: Normal breath sounds.   Cardiovascular:      PMI at left midclavicular line. Bradycardia present. Regular rhythm.   Pulses:     Intact distal pulses.   Edema:     Peripheral edema absent.   Abdominal:      General: Bowel sounds are normal.      Palpations: Abdomen is soft.   Musculoskeletal: Normal range of motion.      Cervical back: Normal range of motion. Skin:     General: Skin is warm and dry.   Neurological:      Mental Status: Alert and oriented to person, place, and time.   Psychiatric:         Mood and Affect: Mood normal.         Behavior: Behavior normal.         Vital signs:  /71   Pulse 63   Ht 182.9 cm (72\")   Wt 112 kg (247 lb)   SpO2 96%   BMI 33.50 kg/m²       Recent labs reviewed:    CBC        BMP        CMP       Creatinine Clearance  CrCl cannot be calculated (Patient's most " recent lab result is older than the maximum 30 days allowed.).    BNP        TROPONIN        CoAg          Cardiology results reviewed:    EKG    ECG 12 Lead    Date/Time: 1/13/2025 10:50 AM  Performed by: Lisa Jimenez APRN    Authorized by: Lisa Jimenez APRN  Comparison: compared with previous ECG   Rhythm: sinus bradycardia  Rate: bradycardic  Other findings: T wave abnormality    Clinical impression: abnormal EKG  Comments: HR 58 bpm,  ms,  ms, QTc 444 ms            Stress test  Results for orders placed during the hospital encounter of 03/04/20    Stress Test With Myocardial Perfusion One Day    Interpretation Summary  · Findings consistent with a normal ECG stress test.  · Left ventricular ejection fraction is normal (Calculated EF = 59%).  · Impressions are consistent with a low risk study.  · Small fixed distal inferolateral defect without any ischemia EF 60%.    No changes in EKG with lexiscan.  Correlation with myocardial perfusion images recommended      Echocardiogram      Cardiac catheterization  Results for orders placed during the hospital encounter of 07/08/22    Cardiac Catheterization/Vascular Study    Narrative  CARDIAC CATHETERIZATION REPORT      DATE OF PROCEDURE:  7/9/2022    INDICATION FOR PROCEDURE:    Chest pain  CAD  S/p coronary artery stenting in the past    PROCEDURE PERFORMED:    Left heart catheterization  coronary angiography  left ventriculography    PROCEDURE COMMENTS:    After informed consent was obtained, the patient was prepped and draped in the usual sterile manner.  Mild to moderate sedation was administered.  Right femoral artery was accessed without difficulty and 6 American arterial sheath was inserted.  Sheath was flushed with heparinized saline.    Using 6 American Godfrey catheters, first left coronary artery and the right coronary was electively engaged and appropriate views were taken.  A 6 American JR4 catheter was used to cross aortic valve and left heart  catheterization was performed.  Left ventriculography was done in GARRETT view    Patient tolerated the procedure well.    FINDINGS:    1. HEMODYNAMICS:    Aortic pressure: 97/62 mmHg    LVEDP: 0 to 5 mmHg    Gradient across aortic valve on pullback: No gradient across aortic valve      2. LEFT VENTRICULOGRAPHY: 55 to 60%      3. CORONARY ANGIOGRAPHY:    A: Left main coronary artery: Short and normal    B: Left anterior descending artery: 25 to 30% in the mid segment of LAD.  D3 branch of LAD has ostial disease up to 25%.  D2 branch of LAD has mild disease but it is a small caliber vessel    C: Left circumflex coronary artery: LCx has 25 to 30% stenosis in the midsegment no high-grade stenosis    D: Right coronary artery: RCA is large and dominant and has 30 to 40% stenosis in mid to distal segment.    SUMMARY:    Nonobstructive CAD  Preserved left ventricle    RECOMMENDATIONS:    Medical treatment               Assessment:         Diagnoses and all orders for this visit:    1. Unstable angina (Primary)  -     isosorbide mononitrate (IMDUR) 60 MG 24 hr tablet; Take 1 tablet by mouth 2 (Two) Times a Day.  Dispense: 90 tablet; Refill: 3  -     Case Request Cath Lab: Left Heart Cath with Coronary Angiography    2. Coronary artery disease involving native coronary artery of native heart with angina pectoris with documented spasm  -     isosorbide mononitrate (IMDUR) 60 MG 24 hr tablet; Take 1 tablet by mouth 2 (Two) Times a Day.  Dispense: 90 tablet; Refill: 3  -     Case Request Cath Lab: Left Heart Cath with Coronary Angiography  -     CBC (No Diff); Future  -     Basic Metabolic Panel; Future    3. Hyperlipidemia, unspecified hyperlipidemia type    4. Primary hypertension    5. Obesity (BMI 30-39.9)                Plan:       Unstable angina  Coronary artery disease involving native coronary artery of native heart with angina pectoris with documented spasm  History of PCI to the LAD with slow flow in the LAD   Cardiac  catheterization done in 2022 showed mid LAD with 25-30% narrowing, D3 branch of LAD with ostial disease up to 25%, D2 branch of LAD with mild disease, Lcx with 25-30% stenosis in the mid-segment with no high-grade stenosis, and RCA with 30-40% stenosis in the mid to distal segment.   EKG today shows sinus bradycardia with nonspecific T wave abnormalities.   Continue medical management with DAPT, high intensity statin, calcium channel blocker, beta blocker, ACE-I and long-acting nitrate   Patient reports relief of each episode of pain with 1-2 sublingual nitroglycerin  Due to unstable angina I recommended he go to the ER, but he declined as he is currently pain-free.  He will be scheduled for a left cardiac catheterization with Dr. Lynn.   I will increase Imdur 30 mg twice daily to 60 mg twice daily  He was advised to go to the ER if his symptoms recur or worsen.     Hyperlipidemia, unspecified hyperlipidemia type  LDL at goal of less than 70  Continue Zetia and Crestor    Primary hypertension, chronic  Blood pressure is controlled  Continue amlodipine, Imdur, lisinopril, and Toprol XL    Obesity (BMI 30-39.9)  BMI is 33.50  Lifestyle modifications recommended   Sleep apnea screening:  Patient denies snoring. He reports fatigue, but states it is chronic. He denies any other sleep apnea-related symptoms.     Hiatal hernia  On Pepcid  Followed by GI as outpatient      Electronically signed by CHEMO Casas, 01/13/25, 11:03 AM EST.

## 2025-01-13 ENCOUNTER — OFFICE (OUTPATIENT)
Dept: URBAN - METROPOLITAN AREA CLINIC 64 | Facility: CLINIC | Age: 80
End: 2025-01-13
Payer: MEDICARE

## 2025-01-13 ENCOUNTER — OFFICE VISIT (OUTPATIENT)
Dept: CARDIOLOGY | Facility: CLINIC | Age: 80
End: 2025-01-13
Payer: MEDICARE

## 2025-01-13 VITALS
HEIGHT: 72 IN | WEIGHT: 258 LBS | DIASTOLIC BLOOD PRESSURE: 97 MMHG | HEART RATE: 61 BPM | SYSTOLIC BLOOD PRESSURE: 159 MMHG

## 2025-01-13 VITALS
DIASTOLIC BLOOD PRESSURE: 71 MMHG | HEART RATE: 63 BPM | OXYGEN SATURATION: 96 % | WEIGHT: 247 LBS | SYSTOLIC BLOOD PRESSURE: 130 MMHG | HEIGHT: 72 IN | BODY MASS INDEX: 33.46 KG/M2

## 2025-01-13 DIAGNOSIS — K21.9 GASTRO-ESOPHAGEAL REFLUX DISEASE WITHOUT ESOPHAGITIS: ICD-10-CM

## 2025-01-13 DIAGNOSIS — I10 PRIMARY HYPERTENSION: Chronic | ICD-10-CM

## 2025-01-13 DIAGNOSIS — I25.111 CORONARY ARTERY DISEASE INVOLVING NATIVE CORONARY ARTERY OF NATIVE HEART WITH ANGINA PECTORIS WITH DOCUMENTED SPASM: Chronic | ICD-10-CM

## 2025-01-13 DIAGNOSIS — I20.0 UNSTABLE ANGINA: Primary | ICD-10-CM

## 2025-01-13 DIAGNOSIS — E66.9 OBESITY (BMI 30-39.9): Chronic | ICD-10-CM

## 2025-01-13 DIAGNOSIS — E78.5 HYPERLIPIDEMIA, UNSPECIFIED HYPERLIPIDEMIA TYPE: Chronic | ICD-10-CM

## 2025-01-13 PROCEDURE — 99213 OFFICE O/P EST LOW 20 MIN: CPT | Performed by: NURSE PRACTITIONER

## 2025-01-13 RX ORDER — ISOSORBIDE MONONITRATE 60 MG/1
60 TABLET, EXTENDED RELEASE ORAL 2 TIMES DAILY
Qty: 90 TABLET | Refills: 3 | Status: SHIPPED | OUTPATIENT
Start: 2025-01-13

## 2025-01-18 ENCOUNTER — APPOINTMENT (OUTPATIENT)
Dept: GENERAL RADIOLOGY | Facility: HOSPITAL | Age: 80
End: 2025-01-18
Payer: MEDICARE

## 2025-01-18 ENCOUNTER — HOSPITAL ENCOUNTER (OUTPATIENT)
Facility: HOSPITAL | Age: 80
Discharge: HOME | End: 2025-01-19
Attending: EMERGENCY MEDICINE | Admitting: EMERGENCY MEDICINE
Payer: MEDICARE

## 2025-01-18 DIAGNOSIS — I25.111 CORONARY ARTERY DISEASE INVOLVING NATIVE CORONARY ARTERY OF NATIVE HEART WITH ANGINA PECTORIS WITH DOCUMENTED SPASM: Chronic | ICD-10-CM

## 2025-01-18 DIAGNOSIS — R07.89 OTHER CHEST PAIN: ICD-10-CM

## 2025-01-18 DIAGNOSIS — R07.9 CHEST PAIN, UNSPECIFIED TYPE: Primary | ICD-10-CM

## 2025-01-18 DIAGNOSIS — I20.0 UNSTABLE ANGINA: ICD-10-CM

## 2025-01-18 LAB
ALBUMIN SERPL-MCNC: 4 G/DL (ref 3.5–5.2)
ALBUMIN/GLOB SERPL: 1.4 G/DL
ALP SERPL-CCNC: 71 U/L (ref 39–117)
ALT SERPL W P-5'-P-CCNC: 23 U/L (ref 1–41)
ANION GAP SERPL CALCULATED.3IONS-SCNC: 12 MMOL/L (ref 5–15)
AST SERPL-CCNC: 31 U/L (ref 1–40)
BASOPHILS # BLD AUTO: 0.04 10*3/MM3 (ref 0–0.2)
BASOPHILS NFR BLD AUTO: 0.7 % (ref 0–1.5)
BILIRUB SERPL-MCNC: 0.6 MG/DL (ref 0–1.2)
BUN SERPL-MCNC: 9 MG/DL (ref 8–23)
BUN/CREAT SERPL: 10.7 (ref 7–25)
CALCIUM SPEC-SCNC: 8.8 MG/DL (ref 8.6–10.5)
CHLORIDE SERPL-SCNC: 107 MMOL/L (ref 98–107)
CO2 SERPL-SCNC: 21 MMOL/L (ref 22–29)
CREAT SERPL-MCNC: 0.84 MG/DL (ref 0.76–1.27)
DEPRECATED RDW RBC AUTO: 44.8 FL (ref 37–54)
EGFRCR SERPLBLD CKD-EPI 2021: 89.8 ML/MIN/1.73
EOSINOPHIL # BLD AUTO: 0.25 10*3/MM3 (ref 0–0.4)
EOSINOPHIL NFR BLD AUTO: 4.4 % (ref 0.3–6.2)
ERYTHROCYTE [DISTWIDTH] IN BLOOD BY AUTOMATED COUNT: 13.8 % (ref 12.3–15.4)
GEN 5 1HR TROPONIN T REFLEX: 7 NG/L
GLOBULIN UR ELPH-MCNC: 2.8 GM/DL
GLUCOSE SERPL-MCNC: 116 MG/DL (ref 65–99)
HCT VFR BLD AUTO: 45.8 % (ref 37.5–51)
HGB BLD-MCNC: 15.2 G/DL (ref 13–17.7)
HOLD SPECIMEN: NORMAL
HOLD SPECIMEN: NORMAL
IMM GRANULOCYTES # BLD AUTO: 0.01 10*3/MM3 (ref 0–0.05)
IMM GRANULOCYTES NFR BLD AUTO: 0.2 % (ref 0–0.5)
LYMPHOCYTES # BLD AUTO: 1.03 10*3/MM3 (ref 0.7–3.1)
LYMPHOCYTES NFR BLD AUTO: 18.2 % (ref 19.6–45.3)
MCH RBC QN AUTO: 29.2 PG (ref 26.6–33)
MCHC RBC AUTO-ENTMCNC: 33.2 G/DL (ref 31.5–35.7)
MCV RBC AUTO: 88.1 FL (ref 79–97)
MONOCYTES # BLD AUTO: 0.66 10*3/MM3 (ref 0.1–0.9)
MONOCYTES NFR BLD AUTO: 11.7 % (ref 5–12)
NEUTROPHILS NFR BLD AUTO: 3.66 10*3/MM3 (ref 1.7–7)
NEUTROPHILS NFR BLD AUTO: 64.8 % (ref 42.7–76)
NRBC BLD AUTO-RTO: 0 /100 WBC (ref 0–0.2)
PLATELET # BLD AUTO: 167 10*3/MM3 (ref 140–450)
PMV BLD AUTO: 10.9 FL (ref 6–12)
POTASSIUM SERPL-SCNC: 3.9 MMOL/L (ref 3.5–5.2)
PROT SERPL-MCNC: 6.8 G/DL (ref 6–8.5)
RBC # BLD AUTO: 5.2 10*6/MM3 (ref 4.14–5.8)
SODIUM SERPL-SCNC: 140 MMOL/L (ref 136–145)
TROPONIN T NUMERIC DELTA: 0 NG/L
TROPONIN T SERPL HS-MCNC: 7 NG/L
WBC NRBC COR # BLD AUTO: 5.65 10*3/MM3 (ref 3.4–10.8)
WHOLE BLOOD HOLD COAG: NORMAL
WHOLE BLOOD HOLD SPECIMEN: NORMAL

## 2025-01-18 PROCEDURE — 85025 COMPLETE CBC W/AUTO DIFF WBC: CPT | Performed by: EMERGENCY MEDICINE

## 2025-01-18 PROCEDURE — G0378 HOSPITAL OBSERVATION PER HR: HCPCS

## 2025-01-18 PROCEDURE — 36415 COLL VENOUS BLD VENIPUNCTURE: CPT

## 2025-01-18 PROCEDURE — 99285 EMERGENCY DEPT VISIT HI MDM: CPT

## 2025-01-18 PROCEDURE — 99214 OFFICE O/P EST MOD 30 MIN: CPT | Performed by: INTERNAL MEDICINE

## 2025-01-18 PROCEDURE — 63710000001 DIPHENHYDRAMINE PER 50 MG: Performed by: NURSE PRACTITIONER

## 2025-01-18 PROCEDURE — 80053 COMPREHEN METABOLIC PANEL: CPT | Performed by: EMERGENCY MEDICINE

## 2025-01-18 PROCEDURE — 84484 ASSAY OF TROPONIN QUANT: CPT | Performed by: EMERGENCY MEDICINE

## 2025-01-18 PROCEDURE — 93005 ELECTROCARDIOGRAM TRACING: CPT

## 2025-01-18 PROCEDURE — 71045 X-RAY EXAM CHEST 1 VIEW: CPT

## 2025-01-18 PROCEDURE — 93005 ELECTROCARDIOGRAM TRACING: CPT | Performed by: EMERGENCY MEDICINE

## 2025-01-18 RX ORDER — DIPHENHYDRAMINE HCL 25 MG
25 CAPSULE ORAL NIGHTLY PRN
Status: DISCONTINUED | OUTPATIENT
Start: 2025-01-18 | End: 2025-01-19 | Stop reason: HOSPADM

## 2025-01-18 RX ORDER — ROSUVASTATIN CALCIUM 10 MG/1
40 TABLET, COATED ORAL NIGHTLY
Status: DISCONTINUED | OUTPATIENT
Start: 2025-01-18 | End: 2025-01-19 | Stop reason: HOSPADM

## 2025-01-18 RX ORDER — ACETAMINOPHEN 325 MG/1
650 TABLET ORAL EVERY 4 HOURS PRN
Status: DISCONTINUED | OUTPATIENT
Start: 2025-01-18 | End: 2025-01-19 | Stop reason: HOSPADM

## 2025-01-18 RX ORDER — SODIUM CHLORIDE 0.9 % (FLUSH) 0.9 %
10 SYRINGE (ML) INJECTION AS NEEDED
Status: DISCONTINUED | OUTPATIENT
Start: 2025-01-18 | End: 2025-01-19 | Stop reason: HOSPADM

## 2025-01-18 RX ORDER — AMLODIPINE BESYLATE 5 MG/1
5 TABLET ORAL DAILY
Status: DISCONTINUED | OUTPATIENT
Start: 2025-01-18 | End: 2025-01-19 | Stop reason: HOSPADM

## 2025-01-18 RX ORDER — CLOPIDOGREL BISULFATE 75 MG/1
75 TABLET ORAL DAILY
Status: DISCONTINUED | OUTPATIENT
Start: 2025-01-19 | End: 2025-01-19 | Stop reason: HOSPADM

## 2025-01-18 RX ORDER — ONDANSETRON 4 MG/1
4 TABLET, ORALLY DISINTEGRATING ORAL EVERY 6 HOURS PRN
Status: DISCONTINUED | OUTPATIENT
Start: 2025-01-18 | End: 2025-01-19

## 2025-01-18 RX ORDER — ACETAMINOPHEN 160 MG/5ML
650 SOLUTION ORAL EVERY 4 HOURS PRN
Status: DISCONTINUED | OUTPATIENT
Start: 2025-01-18 | End: 2025-01-19 | Stop reason: HOSPADM

## 2025-01-18 RX ORDER — SODIUM CHLORIDE 0.9 % (FLUSH) 0.9 %
10 SYRINGE (ML) INJECTION EVERY 12 HOURS SCHEDULED
Status: DISCONTINUED | OUTPATIENT
Start: 2025-01-18 | End: 2025-01-19 | Stop reason: HOSPADM

## 2025-01-18 RX ORDER — ASPIRIN 81 MG/1
81 TABLET ORAL DAILY
Status: DISCONTINUED | OUTPATIENT
Start: 2025-01-19 | End: 2025-01-19 | Stop reason: HOSPADM

## 2025-01-18 RX ORDER — BISACODYL 5 MG/1
5 TABLET, DELAYED RELEASE ORAL DAILY PRN
Status: DISCONTINUED | OUTPATIENT
Start: 2025-01-18 | End: 2025-01-19 | Stop reason: HOSPADM

## 2025-01-18 RX ORDER — ACETAMINOPHEN 650 MG/1
650 SUPPOSITORY RECTAL EVERY 4 HOURS PRN
Status: DISCONTINUED | OUTPATIENT
Start: 2025-01-18 | End: 2025-01-19 | Stop reason: HOSPADM

## 2025-01-18 RX ORDER — SODIUM CHLORIDE 9 MG/ML
40 INJECTION, SOLUTION INTRAVENOUS AS NEEDED
Status: DISCONTINUED | OUTPATIENT
Start: 2025-01-18 | End: 2025-01-19 | Stop reason: HOSPADM

## 2025-01-18 RX ORDER — NITROGLYCERIN 0.4 MG/1
0.4 TABLET SUBLINGUAL
Status: DISCONTINUED | OUTPATIENT
Start: 2025-01-18 | End: 2025-01-19

## 2025-01-18 RX ORDER — LISINOPRIL 5 MG/1
5 TABLET ORAL DAILY
Status: DISCONTINUED | OUTPATIENT
Start: 2025-01-19 | End: 2025-01-19 | Stop reason: HOSPADM

## 2025-01-18 RX ORDER — ALUMINA, MAGNESIA, AND SIMETHICONE 2400; 2400; 240 MG/30ML; MG/30ML; MG/30ML
15 SUSPENSION ORAL EVERY 6 HOURS PRN
Status: DISCONTINUED | OUTPATIENT
Start: 2025-01-18 | End: 2025-01-19 | Stop reason: HOSPADM

## 2025-01-18 RX ORDER — BISACODYL 10 MG
10 SUPPOSITORY, RECTAL RECTAL DAILY PRN
Status: DISCONTINUED | OUTPATIENT
Start: 2025-01-18 | End: 2025-01-19 | Stop reason: HOSPADM

## 2025-01-18 RX ORDER — ONDANSETRON 2 MG/ML
4 INJECTION INTRAMUSCULAR; INTRAVENOUS EVERY 6 HOURS PRN
Status: DISCONTINUED | OUTPATIENT
Start: 2025-01-18 | End: 2025-01-19

## 2025-01-18 RX ORDER — ISOSORBIDE MONONITRATE 60 MG/1
60 TABLET, EXTENDED RELEASE ORAL 2 TIMES DAILY
Status: DISCONTINUED | OUTPATIENT
Start: 2025-01-18 | End: 2025-01-19 | Stop reason: HOSPADM

## 2025-01-18 RX ORDER — METOPROLOL SUCCINATE 25 MG/1
12.5 TABLET, EXTENDED RELEASE ORAL DAILY
Status: DISCONTINUED | OUTPATIENT
Start: 2025-01-19 | End: 2025-01-19 | Stop reason: HOSPADM

## 2025-01-18 RX ORDER — POLYETHYLENE GLYCOL 3350 17 G/17G
17 POWDER, FOR SOLUTION ORAL DAILY PRN
Status: DISCONTINUED | OUTPATIENT
Start: 2025-01-18 | End: 2025-01-19 | Stop reason: HOSPADM

## 2025-01-18 RX ORDER — AMOXICILLIN 250 MG
2 CAPSULE ORAL 2 TIMES DAILY PRN
Status: DISCONTINUED | OUTPATIENT
Start: 2025-01-18 | End: 2025-01-19 | Stop reason: HOSPADM

## 2025-01-18 RX ORDER — FAMOTIDINE 20 MG/1
20 TABLET, FILM COATED ORAL NIGHTLY
Status: DISCONTINUED | OUTPATIENT
Start: 2025-01-18 | End: 2025-01-19 | Stop reason: HOSPADM

## 2025-01-18 RX ADMIN — AMLODIPINE BESYLATE 5 MG: 5 TABLET ORAL at 20:33

## 2025-01-18 RX ADMIN — DIPHENHYDRAMINE HYDROCHLORIDE 25 MG: 25 CAPSULE ORAL at 20:39

## 2025-01-18 RX ADMIN — ISOSORBIDE MONONITRATE 60 MG: 60 TABLET, EXTENDED RELEASE ORAL at 20:33

## 2025-01-18 RX ADMIN — ROSUVASTATIN CALCIUM 40 MG: 10 TABLET, FILM COATED ORAL at 20:32

## 2025-01-18 RX ADMIN — Medication 10 ML: at 20:35

## 2025-01-18 RX ADMIN — FAMOTIDINE 20 MG: 20 TABLET, FILM COATED ORAL at 20:33

## 2025-01-18 NOTE — Clinical Note
Hemostasis started on the right femoral artery. R-Band was used in achieving hemostasis. Radial compression device applied to vessel. Hemostasis achieved successfully. Closure device additional comment: 12cc

## 2025-01-18 NOTE — CONSULTS
Referring Provider: Pa Smith MD    Reason for Consultation: Chest pain      Patient Care Team:  Rama Quintanilla APRN as PCP - General  Rama Quintanilla APRN as PCP - Family Medicine  Trey Kennedy MD as Consulting Physician (Cardiology)  Bright Mcgarry MD as Consulting Physician (Hematology and Oncology)      SUBJECTIVE     Chief Complaint: Chest pain    History of present illness:  Donald Navarro is a 79 y.o. male with history of CAD status post PCI to the LAD, hypertension, hyperlipidemia.   Previous cardiac catheterization from 7/9/2022.  He has a previous stent in the LAD which is patent.  He has slow flow in the LAD.  He has also significant disease in his diagonals.  His circumflex has about 30% stenosis in the midsegment.  His RCA has about 30 to 40% stenosis.     Current cardiac medications include aspirin, amlodipine, Plavix, Zetia, lisinopril, Toprol-XL and Crestor.    He presents to the hospital with chest pain that woke him up from sleep.  The chest pain resolved with nitroglycerin.  Her symptoms have been worsening over the last several months and he was recommended a cardiac catheterization which is scheduled in 2 weeks.    Review of systems:    Constitutional: No weakness, fatigue, fever, rigors, chills   Eyes: No vision changes, eye pain   ENT/oropharynx: No difficulty swallowing, sore throat, epistaxis, changes in hearing   Cardiovascular: + chest pain, chest tightness, palpitations, paroxysmal nocturnal dyspnea, orthopnea, diaphoresis, dizziness / syncopal episode   Respiratory: No shortness of breath, dyspnea on exertion, cough, wheezing, hemoptysis   Gastrointestinal: No abdominal pain, nausea, vomiting, diarrhea, bloody stools   Genitourinary: No hematuria, dysuria   Neurological: No headache, tremors, numbness, one-sided weakness    Musculoskeletal: No cramps, myalgias, joint pain, joint swelling   Integument: No rash, edema        Personal History:      Past  Medical History:   Diagnosis Date    CHF (congestive heart failure)     Coronary artery disease     GERD (gastroesophageal reflux disease)     Hyperlipidemia     Hypertension     Myocardial infarction        Past Surgical History:   Procedure Laterality Date    CARDIAC CATHETERIZATION  01/2007    TIA: proximal LAD    CARDIAC CATHETERIZATION N/A 07/09/2022    Procedure: Left Heart Cath possible PCI, atherectomy, hemodynamic support;  Surgeon: John Perez MD;  Location: [facility] CATH INVASIVE LOCATION;  Service: Cardiovascular;  Laterality: N/A;    COLONOSCOPY  07/2023    COLONOSCOPY N/A 06/27/2023    Procedure: COLONOSCOPY with terminal ileum biopsy's and cold snare polypectomy x 10;  Surgeon: Neymar Watson MD;  Location: [facility] ENDOSCOPY;  Service: Gastroenterology;  Laterality: N/A;  diverticulosis, internal hemorrhoids    HERNIA REPAIR      TOTAL HIP ARTHROPLASTY Left        Family History   Problem Relation Age of Onset    Parkinsonism Mother     Heart disease Father     Valvular heart disease Father     Pancreatic cancer Sister 85    Dementia Sister     Dementia Sister        Social History     Tobacco Use    Smoking status: Never     Passive exposure: Never    Smokeless tobacco: Never   Vaping Use    Vaping status: Never Used   Substance Use Topics    Alcohol use: No    Drug use: No        Home meds:  Prior to Admission medications    Medication Sig Start Date End Date Taking? Authorizing Provider   amLODIPine (NORVASC) 5 MG tablet Take 1 tablet by mouth Daily. 9/10/24   Maximino Lynn MD   aspirin 81 MG EC tablet Take 1 tablet by mouth Daily. Indications: Stable Angina Pectoris    ProviderBlayne MD   clopidogrel (PLAVIX) 75 MG tablet Take 1 tablet by mouth once daily 5/13/24   ChemchiriLicha paige MD   diphenhydrAMINE-acetaminophen (TYLENOL PM)  MG tablet per tablet Take 2 tablets by mouth At Night As Needed. 1/10/14   Blayne Scales MD   ezetimibe (ZETIA) 10 MG tablet Take 1 tablet by  "mouth Daily. 3/27/24   Edwige Jerome MD   famotidine (PEPCID) 20 MG tablet Take 1 tablet by mouth 2 (Two) Times a Day.    ProviderBlayne MD   fluticasone (FLONASE) 50 MCG/ACT nasal spray Administer 2 sprays into the nostril(s) as directed by provider Daily As Needed for Rhinitis.    Provider, MD Blayne   isosorbide mononitrate (IMDUR) 60 MG 24 hr tablet Take 1 tablet by mouth 2 (Two) Times a Day. 1/13/25   Lisa Jimenez APRN   lisinopril (PRINIVIL,ZESTRIL) 5 MG tablet Take 1 tablet by mouth once daily 6/26/24   Licha Zamora MD   metoprolol succinate XL (TOPROL-XL) 25 MG 24 hr tablet Take 0.5 tablets by mouth Daily. 3/19/24   Edwige Jerome MD   nitroglycerin (NITROSTAT) 0.4 MG SL tablet 1 under the tongue as needed for angina, may repeat q5mins for up three doses 7/20/22   Licha Zamora MD   rosuvastatin (CRESTOR) 40 MG tablet Take 1 tablet by mouth Every Night. 9/30/24   Maximino Lynn MD       Allergies:     Adhesive tape    Scheduled Meds:   Continuous Infusions:   PRN Meds:  sodium chloride      OBJECTIVE    Vital Signs  Vitals:    01/18/25 1230 01/18/25 1231 01/18/25 1246 01/18/25 1318   BP:  110/74 102/64 115/71   Pulse: 52 55 51 54   Resp: 16      Temp:       TempSrc:       SpO2: 98% 99% 98% 98%   Weight:       Height:           Flowsheet Rows      Flowsheet Row First Filed Value   Admission Height 182.9 cm (72\") Documented at 01/18/2025 1035   Admission Weight 111 kg (244 lb 7.8 oz) Documented at 01/18/2025 1035            No intake or output data in the 24 hours ending 01/18/25 1400     Telemetry: Normal sinus rhythm    Physical Exam:  The patient is alert, oriented and in no distress.  Obese  Vital signs as noted above.  Head and neck revealed no carotid bruits or jugular venous distention.  No thyromegaly or lymphadenopathy is present  Lungs clear.  No wheezing.  Breath sounds are normal bilaterally.  Heart: Normal first and second heart sounds. No murmur.  No precordial rub is " present.  No gallop is present.  Abdomen: Soft and nontender.  No organomegaly is present.  Extremities with good peripheral pulses without any pedal edema.  Skin: Warm and dry.  Musculoskeletal system is grossly normal.  CNS grossly normal.       Results Review:  I have personally reviewed the results from the time of this admission to 1/18/2025 14:00 EST and agree with these findings:  []  Laboratory  []  Microbiology  []  Radiology  []  EKG/Telemetry   []  Cardiology/Vascular   []  Pathology  []  Old records  []  Other:    Most notable findings include:     Lab Results (last 24 hours)       Procedure Component Value Units Date/Time    High Sensitivity Troponin T 1Hr [[id number]]  (Normal) Collected: 01/18/25 1228    Specimen: Blood Updated: 01/18/25 1259     HS Troponin T 7 ng/L      Comment: Specimen hemolyzed.  Results may be falsely decreased.        Troponin T Numeric Delta 0 ng/L     Narrative:      High Sensitive Troponin T Reference Range:  <14.0 ng/L- Negative Female for AMI  <22.0 ng/L- Negative Male for AMI  >=14 - Abnormal Female indicating possible myocardial injury.  >=22 - Abnormal Male indicating possible myocardial injury.   Clinicians would have to utilize clinical acumen, EKG, Troponin, and serial changes to determine if it is an Acute Myocardial Infarction or myocardial injury due to an underlying chronic condition.         Comprehensive Metabolic Panel [[id number]]  (Abnormal) Collected: 01/18/25 1125    Specimen: Blood Updated: 01/18/25 1156     Glucose 116 mg/dL      BUN 9 mg/dL      Creatinine 0.84 mg/dL      Sodium 140 mmol/L      Potassium 3.9 mmol/L      Chloride 107 mmol/L      CO2 21.0 mmol/L      Calcium 8.8 mg/dL      Total Protein 6.8 g/dL      Albumin 4.0 g/dL      ALT (SGPT) 23 U/L      AST (SGOT) 31 U/L      Alkaline Phosphatase 71 U/L      Total Bilirubin 0.6 mg/dL      Globulin 2.8 gm/dL      A/G Ratio 1.4 g/dL      BUN/Creatinine Ratio 10.7     Anion Gap 12.0 mmol/L      eGFR  89.8 mL/min/1.73     Narrative:      GFR Categories in Chronic Kidney Disease (CKD)      GFR Category          GFR (mL/min/1.73)    Interpretation  G1                     90 or greater         Normal or high (1)  G2                      60-89                Mild decrease (1)  G3a                   45-59                Mild to moderate decrease  G3b                   30-44                Moderate to severe decrease  G4                    15-29                Severe decrease  G5                    14 or less           Kidney failure          (1)In the absence of evidence of kidney disease, neither GFR category G1 or G2 fulfill the criteria for CKD.    eGFR calculation 2021 CKD-EPI creatinine equation, which does not include race as a factor    High Sensitivity Troponin T [[id number]]  (Normal) Collected: 01/18/25 1125    Specimen: Blood Updated: 01/18/25 1156     HS Troponin T 7 ng/L     Narrative:      High Sensitive Troponin T Reference Range:  <14.0 ng/L- Negative Female for AMI  <22.0 ng/L- Negative Male for AMI  >=14 - Abnormal Female indicating possible myocardial injury.  >=22 - Abnormal Male indicating possible myocardial injury.   Clinicians would have to utilize clinical acumen, EKG, Troponin, and serial changes to determine if it is an Acute Myocardial Infarction or myocardial injury due to an underlying chronic condition.         [place] Draw [[id number]] Collected: 01/18/25 1125    Specimen: Blood Updated: 01/18/25 1145    Narrative:      The following orders were created for panel order [place] Draw.  Procedure                               Abnormality         Status                     ---------                               -----------         ------                     Green Top (Gel)[[id number]]                                  Final result               Lavender Top[[id number]]                                     Final result               Gold Top - SST[[id number]]                                    Final result               Light Blue Top[[id number]]                                   Final result                 Please view results for these tests on the individual orders.    Green Top (Gel) [[id number]] Collected: 01/18/25 1125    Specimen: Blood Updated: 01/18/25 1145     Extra Tube Hold for add-ons.     Comment: Auto resulted.       Lavender Top [[id number]] Collected: 01/18/25 1125    Specimen: Blood Updated: 01/18/25 1145     Extra Tube hold for add-on     Comment: Auto resulted       Gold Top - SST [[id number]] Collected: 01/18/25 1125    Specimen: Blood Updated: 01/18/25 1145     Extra Tube Hold for add-ons.     Comment: Auto resulted.       Light Blue Top [[id number]] Collected: 01/18/25 1125    Specimen: Blood Updated: 01/18/25 1145     Extra Tube Hold for add-ons.     Comment: Auto resulted       CBC & Differential [[id number]]  (Abnormal) Collected: 01/18/25 1125    Specimen: Blood Updated: 01/18/25 1133    Narrative:      The following orders were created for panel order CBC & Differential.  Procedure                               Abnormality         Status                     ---------                               -----------         ------                     CBC Auto Differential[[id number]]        Abnormal            Final result                 Please view results for these tests on the individual orders.    CBC Auto Differential [[id number]]  (Abnormal) Collected: 01/18/25 1125    Specimen: Blood Updated: 01/18/25 1133     WBC 5.65 10*3/mm3      RBC 5.20 10*6/mm3      Hemoglobin 15.2 g/dL      Hematocrit 45.8 %      MCV 88.1 fL      MCH 29.2 pg      MCHC 33.2 g/dL      RDW 13.8 %      RDW-SD 44.8 fl      MPV 10.9 fL      Platelets 167 10*3/mm3      Neutrophil % 64.8 %      Lymphocyte % 18.2 %      Monocyte % 11.7 %      Eosinophil % 4.4 %      Basophil % 0.7 %      Immature Grans % 0.2 %      Neutrophils, Absolute 3.66 10*3/mm3      Lymphocytes, Absolute 1.03 10*3/mm3      Monocytes, Absolute 0.66  10*3/mm3      Eosinophils, Absolute 0.25 10*3/mm3      Basophils, Absolute 0.04 10*3/mm3      Immature Grans, Absolute 0.01 10*3/mm3      nRBC 0.0 /100 WBC             Imaging Results (Last 24 Hours)       Procedure Component Value Units Date/Time    XR Chest 1 View [[id number]] Collected: 01/18/25 1124     Updated: 01/18/25 1127    Narrative:      XR CHEST 1 VW    Date of Exam: 1/18/2025 11:15 AM EST    Indication: Chest Pain Protocol  Chest Pain Protocol    Comparison: 7/8/2022    Findings:  Heart size and pulmonary vasculature within normal limits. Lungs clear. Costophrenic angle sharp      Impression:      Impression:  No active cardiopulmonary disease      Electronically Signed: Manuel Hutchins    1/18/2025 11:25 AM EST    Workstation ID: [id number]            LAB RESULTS (LAST 7 DAYS)    CBC  Results from last 7 days   Lab Units 01/18/25  1125   WBC 10*3/mm3 5.65   RBC 10*6/mm3 5.20   HEMOGLOBIN g/dL 15.2   HEMATOCRIT % 45.8   MCV fL 88.1   PLATELETS 10*3/mm3 167       BMP  Results from last 7 days   Lab Units 01/18/25  1125   SODIUM mmol/L 140   POTASSIUM mmol/L 3.9   CHLORIDE mmol/L 107   CO2 mmol/L 21.0*   BUN mg/dL 9   CREATININE mg/dL 0.84   GLUCOSE mg/dL 116*       CMP   Results from last 7 days   Lab Units 01/18/25  1125   SODIUM mmol/L 140   POTASSIUM mmol/L 3.9   CHLORIDE mmol/L 107   CO2 mmol/L 21.0*   BUN mg/dL 9   CREATININE mg/dL 0.84   GLUCOSE mg/dL 116*   ALBUMIN g/dL 4.0   BILIRUBIN mg/dL 0.6   ALK PHOS U/L 71   AST (SGOT) U/L 31   ALT (SGPT) U/L 23       BNP        TROPONIN  Results from last 7 days   Lab Units 01/18/25  1228   HSTROP T ng/L 7       CoAg        Creatinine Clearance  Estimated Creatinine Clearance: 94.8 mL/min (by C-G formula based on SCr of 0.84 mg/dL).    ABG          Radiology  XR Chest 1 View    Result Date: 1/18/2025  Impression: No active cardiopulmonary disease Electronically Signed: Manuel Hutchins  1/18/2025 11:25 AM EST  Workstation ID: [id number]       EKG  I personally  viewed and interpreted the patient's EKG/Telemetry data:  ECG 12 Lead Chest Pain   Preliminary Result   HEART RATE=65  bpm   RR Eekvgxrx=510  ms   MN Umqoyrxi=208  ms   P Horizontal Axis=39  deg   P Front Axis=23  deg   QRSD Interval=81  ms   QT Kcmdhorm=674  ms   QIqX=143  ms   QRS Axis=-22  deg   T Wave Axis=-10  deg   - BORDERLINE ECG -   Sinus rhythm   Borderline T abnormalities, diffuse leads   Date and Time of Study:2025-01-18 10:44:03      ECG 12 Lead Chest Pain    (Results Pending)         Echocardiogram:          Stress Test:  Results for orders placed during the hospital encounter of 03/04/20    Stress Test With Myocardial Perfusion One Day    Interpretation Summary  · Findings consistent with a normal ECG stress test.  · Left ventricular ejection fraction is normal (Calculated EF = 59%).  · Impressions are consistent with a low risk study.  · Small fixed distal inferolateral defect without any ischemia EF 60%.    No changes in EKG with lexiscan.  Correlation with myocardial perfusion images recommended        Cardiac Catheterization:  Results for orders placed during the hospital encounter of 07/08/22    Cardiac Catheterization/Vascular Study    Narrative  CARDIAC CATHETERIZATION REPORT      DATE OF PROCEDURE:  7/9/2022    INDICATION FOR PROCEDURE:    Chest pain  CAD  S/p coronary artery stenting in the past    PROCEDURE PERFORMED:    Left heart catheterization  coronary angiography  left ventriculography    PROCEDURE COMMENTS:    After informed consent was obtained, the patient was prepped and draped in the usual sterile manner.  Mild to moderate sedation was administered.  Right femoral artery was accessed without difficulty and 6 [place] arterial sheath was inserted.  Sheath was flushed with heparinized saline.    Using 6 [place] Godfrey catheters, first left coronary artery and the right coronary was electively engaged and appropriate views were taken.  A 6 [place] JR4 catheter was used to cross aortic  valve and left heart catheterization was performed.  Left ventriculography was done in GARRETT view    Patient tolerated the procedure well.    FINDINGS:    1. HEMODYNAMICS:    Aortic pressure: 97/62 mmHg    LVEDP: 0 to 5 mmHg    Gradient across aortic valve on pullback: No gradient across aortic valve      2. LEFT VENTRICULOGRAPHY: 55 to 60%      3. CORONARY ANGIOGRAPHY:    A: Left main coronary artery: Short and normal    B: Left anterior descending artery: 25 to 30% in the mid segment of LAD.  D3 branch of LAD has ostial disease up to 25%.  D2 branch of LAD has mild disease but it is a small caliber vessel    C: Left circumflex coronary artery: LCx has 25 to 30% stenosis in the midsegment no high-grade stenosis    D: Right coronary artery: RCA is large and dominant and has 30 to 40% stenosis in mid to distal segment.    SUMMARY:    Nonobstructive CAD  Preserved left ventricle    RECOMMENDATIONS:    Medical treatment        Other:      ASSESSMENT & PLAN:    Active Problems:    Chest pain    Unstable angina  Coronary artery disease  Previous PCI to the LAD with slow flow in the LAD  ECG with normal acute ischemia.  High-sensitivity troponin negative x 2  Continue amlodipine, Imdur and metoprolol  Continue dual antiplatelet therapy and high intensity statin  Will proceed with cardiac catheterization     Hypertension  Heart rate and blood pressure well-controlled     Hyperlipidemia  Continue Crestor  LDL 55, HDL 30, triglyceride 139 and total cholesterol 110     Lymphoma  In remission     Dyspnea on exertion  Echocardiogram shows preserved LV function, grade 1 diastolic dysfunction and no significant valvular abnormalities.  RVSP 38 mmHg.     Obesity  BMI is 33.  He weighs 244 pounds.  Lifestyle modifications recommended.  Screening and treatment for sleep apnea recommended.    Maximino Lynn MD  01/18/25  14:00 EST

## 2025-01-18 NOTE — ED NOTES
Nursing report ED to floor  Donald Navarro  79 y.o.  male    HPI:   Chief Complaint   Patient presents with    Chest Pain       Admitting doctor:   Pa Smith MD    Admitting diagnosis:   The encounter diagnosis was Chest pain, unspecified type.    Code status:   Current Code Status       Date Active Code Status Order ID Comments User Context       Prior            Allergies:   Adhesive tape    Isolation:  No active isolations     Fall Risk:  Fall Risk Assessment was completed, and patient is at moderate risk for falls.   Predictive Model Details         8 (Low) Factor Value    Calculated 1/18/2025 13:28 Age 79    Risk of Fall Model Active Peripheral IV Present     Imaging order in this encounter Present     Magnesium not on file     Diastolic BP 64     Dominik Scale not on file     Calcium 8.8 mg/dL     Clinically Relevant Sex Not Female     Cardiac Assessment X     Albumin 4 g/dL     Total Bilirubin 0.6 mg/dL     Chloride 107 mmol/L     Respiratory Rate 16     Creatinine 0.84 mg/dL     ALT 23 U/L     Days after Admission 0.119     Potassium 3.9 mmol/L         Weight:       01/18/25  1035   Weight: 111 kg (244 lb 7.8 oz)       Intake and Output  No intake or output data in the 24 hours ending 01/18/25 1330    Diet:        Most recent vitals:   Vitals:    01/18/25 1230 01/18/25 1231 01/18/25 1246 01/18/25 1318   BP:  110/74 102/64 115/71   Pulse: 52 55 51 54   Resp: 16      Temp:       TempSrc:       SpO2: 98% 99% 98% 98%   Weight:       Height:           Active LDAs/IV Access:   Lines, Drains & Airways       Active LDAs       Name Placement date Placement time Site Days    Peripheral IV 01/18/25 1127 Left;Posterior Forearm 01/18/25  1127  Forearm  less than 1                    Skin Condition:   Skin Assessments (last day)       None             Labs (abnormal labs have a star):   Labs Reviewed   COMPREHENSIVE METABOLIC PANEL - Abnormal; Notable for the following components:       Result Value    Glucose 116  (*)     CO2 21.0 (*)     All other components within normal limits    Narrative:     GFR Categories in Chronic Kidney Disease (CKD)      GFR Category          GFR (mL/min/1.73)    Interpretation  G1                     90 or greater         Normal or high (1)  G2                      60-89                Mild decrease (1)  G3a                   45-59                Mild to moderate decrease  G3b                   30-44                Moderate to severe decrease  G4                    15-29                Severe decrease  G5                    14 or less           Kidney failure          (1)In the absence of evidence of kidney disease, neither GFR category G1 or G2 fulfill the criteria for CKD.    eGFR calculation 2021 CKD-EPI creatinine equation, which does not include race as a factor   CBC WITH AUTO DIFFERENTIAL - Abnormal; Notable for the following components:    Lymphocyte % 18.2 (*)     All other components within normal limits   TROPONIN - Normal    Narrative:     High Sensitive Troponin T Reference Range:  <14.0 ng/L- Negative Female for AMI  <22.0 ng/L- Negative Male for AMI  >=14 - Abnormal Female indicating possible myocardial injury.  >=22 - Abnormal Male indicating possible myocardial injury.   Clinicians would have to utilize clinical acumen, EKG, Troponin, and serial changes to determine if it is an Acute Myocardial Infarction or myocardial injury due to an underlying chronic condition.        HIGH SENSITIVITIY TROPONIN T 1HR - Normal    Narrative:     High Sensitive Troponin T Reference Range:  <14.0 ng/L- Negative Female for AMI  <22.0 ng/L- Negative Male for AMI  >=14 - Abnormal Female indicating possible myocardial injury.  >=22 - Abnormal Male indicating possible myocardial injury.   Clinicians would have to utilize clinical acumen, EKG, Troponin, and serial changes to determine if it is an Acute Myocardial Infarction or myocardial injury due to an underlying chronic condition.        RAINBOW DRAW     Narrative:     The following orders were created for panel order [place] Draw.  Procedure                               Abnormality         Status                     ---------                               -----------         ------                     Green Top (Gel)[[id number]]                                  Final result               Lavender Top[[id number]]                                     Final result               Gold Top - SST[[id number]]                                   Final result               Light Blue Top[[id number]]                                   Final result                 Please view results for these tests on the individual orders.   CBC AND DIFFERENTIAL    Narrative:     The following orders were created for panel order CBC & Differential.  Procedure                               Abnormality         Status                     ---------                               -----------         ------                     CBC Auto Differential[[id number]]        Abnormal            Final result                 Please view results for these tests on the individual orders.   GREEN TOP   LAVENDER TOP   GOLD TOP - SST   LIGHT BLUE TOP       LOC: Person, Place, Time, and Situation    Telemetry:  Observation Unit    Cardiac Monitoring Ordered: yes    EKG:   ECG 12 Lead Chest Pain   Preliminary Result   HEART RATE=65  bpm   RR Dskowlxu=998  ms   MN Ddfwxgvl=718  ms   P Horizontal Axis=39  deg   P Front Axis=23  deg   QRSD Interval=81  ms   QT Bsarfvbq=551  ms   MMlN=200  ms   QRS Axis=-22  deg   T Wave Axis=-10  deg   - BORDERLINE ECG -   Sinus rhythm   Borderline T abnormalities, diffuse leads   Date and Time of Study:2025-01-18 10:44:03      ECG 12 Lead Chest Pain    (Results Pending)       Medications Given in the ED:   Medications   sodium chloride 0.9 % flush 10 mL (has no administration in time range)       Imaging results:  XR Chest 1 View    Result Date: 1/18/2025  Impression: No active  cardiopulmonary disease Electronically Signed: Manuel Hutchins  1/18/2025 11:25 AM EST  Workstation ID: [id number]     Social issues:   Social History     Socioeconomic History    Marital status:    Tobacco Use    Smoking status: Never     Passive exposure: Never    Smokeless tobacco: Never   Vaping Use    Vaping status: Never Used   Substance and Sexual Activity    Alcohol use: No    Drug use: No    Sexual activity: Defer       NIH Stroke Scale:  Interval: (not recorded)  1a. Level of Consciousness: (not recorded)  1b. LOC Questions: (not recorded)  1c. LOC Commands: (not recorded)  2. Best Gaze: (not recorded)  3. Visual: (not recorded)  4. Facial Palsy: (not recorded)  5a. Motor Arm, Left: (not recorded)  5b. Motor Arm, Right: (not recorded)  6a. Motor Leg, Left: (not recorded)  6b. Motor Leg, Right: (not recorded)  7. Limb Ataxia: (not recorded)  8. Sensory: (not recorded)  9. Best Language: (not recorded)  10. Dysarthria: (not recorded)  11. Extinction and Inattention (formerly Neglect): (not recorded)    Total (NIH Stroke Scale): (not recorded)     Additional notable assessment information:     Nursing report ED to floor:  RAFFI Rubio RN   01/18/25 13:30 EST

## 2025-01-18 NOTE — H&P
[facility] Observation Unit H&P    Patient Name: Donald Navarro  : 1945  MRN: [id number]  Primary Care Physician: Rama Quintanilla APRN  Date of admission: 2025     Patient Care Team:  Rama Quintanilla APRN as PCP - General  Rama Quintanilla APRN as PCP - Family Medicine  Trey Kennedy MD as Consulting Physician (Cardiology)  Bright Mcgarry MD as Consulting Physician (Hematology and Oncology)          Subjective   History Present Illness     Chief Complaint:   Chief Complaint   Patient presents with    Chest Pain     Chest pain     Chest Pain   Pertinent negatives include no diaphoresis, malaise/fatigue, nausea, palpitations, shortness of breath or vomiting.     ED 2025  79-year-old male with a history of heart disease describes substernal chest pressure that woke him from sleep this morning.  He he took a nitroglycerin and it went away and then came back and he took 2 more nitroglycerin and it has now subsided.  He reports no radiating pain or diaphoresis or or shortness of breath.  States he has been having increased angina over the last few weeks and is scheduled for heart cath in 2 weeks.  Aspirin prior to arrival.  He reports no relieving or exacerbating factors     Observation 2025  Patient agrees with HPI noted above and confirms that he had significant chest pain this morning that lasted about 3 hours and subsided after 3 nitroglycerin.  He has a stress test scheduled for February and follows up with Dr. Lynn.  He is currently asymptomatic.  He denies any shortness of breath.    Review of Systems   Constitutional: Negative for diaphoresis and malaise/fatigue.   Cardiovascular:  Positive for chest pain. Negative for dyspnea on exertion and palpitations.   Respiratory:  Negative for shortness of breath.    Gastrointestinal:  Negative for nausea and vomiting.   All other systems reviewed and are negative.          Personal History     Past Medical History:   Past Medical  History:   Diagnosis Date    CHF (congestive heart failure)     Coronary artery disease     GERD (gastroesophageal reflux disease)     Hyperlipidemia     Hypertension     Myocardial infarction        Surgical History:      Past Surgical History:   Procedure Laterality Date    CARDIAC CATHETERIZATION  01/2007    TIA: proximal LAD    CARDIAC CATHETERIZATION N/A 07/09/2022    Procedure: Left Heart Cath possible PCI, atherectomy, hemodynamic support;  Surgeon: John Perez MD;  Location: [facility] CATH INVASIVE LOCATION;  Service: Cardiovascular;  Laterality: N/A;    COLONOSCOPY  07/2023    COLONOSCOPY N/A 06/27/2023    Procedure: COLONOSCOPY with terminal ileum biopsy's and cold snare polypectomy x 10;  Surgeon: Neymar Watson MD;  Location: [facility] ENDOSCOPY;  Service: Gastroenterology;  Laterality: N/A;  diverticulosis, internal hemorrhoids    HERNIA REPAIR      TOTAL HIP ARTHROPLASTY Left            Family History: family history includes Dementia in his sister and sister; Heart disease in his father; Pancreatic cancer (age of onset: 85) in his sister; Parkinsonism in his mother; Valvular heart disease in his father. Otherwise pertinent FHx was reviewed and unremarkable.     Social History:  reports that he has never smoked. He has never been exposed to tobacco smoke. He has never used smokeless tobacco. He reports that he does not drink alcohol and does not use drugs.      Medications:  Prior to Admission medications    Medication Sig Start Date End Date Taking? Authorizing Provider   amLODIPine (NORVASC) 5 MG tablet Take 1 tablet by mouth Daily. 9/10/24   Maximino Lynn MD   aspirin 81 MG EC tablet Take 1 tablet by mouth Daily. Indications: Stable Angina Pectoris    Provider, MD Blayne   clopidogrel (PLAVIX) 75 MG tablet Take 1 tablet by mouth once daily 5/13/24   ChemchiriLicha paige MD   diphenhydrAMINE-acetaminophen (TYLENOL PM)  MG tablet per tablet Take 2 tablets by mouth At Night As Needed. 1/10/14    ProviderBlayne MD   ezetimibe (ZETIA) 10 MG tablet Take 1 tablet by mouth Daily. 3/27/24   Edwige Jerome MD   famotidine (PEPCID) 20 MG tablet Take 1 tablet by mouth 2 (Two) Times a Day.    Blayne Scales MD   fluticasone (FLONASE) 50 MCG/ACT nasal spray Administer 2 sprays into the nostril(s) as directed by provider Daily As Needed for Rhinitis.    Blayne Scales MD   isosorbide mononitrate (IMDUR) 60 MG 24 hr tablet Take 1 tablet by mouth 2 (Two) Times a Day. 1/13/25   Lisa Jimenez APRN   lisinopril (PRINIVIL,ZESTRIL) 5 MG tablet Take 1 tablet by mouth once daily 6/26/24   Licha Zamora MD   metoprolol succinate XL (TOPROL-XL) 25 MG 24 hr tablet Take 0.5 tablets by mouth Daily. 3/19/24   Edwige Jerome MD   nitroglycerin (NITROSTAT) 0.4 MG SL tablet 1 under the tongue as needed for angina, may repeat q5mins for up three doses 7/20/22   Licha Zamora MD   rosuvastatin (CRESTOR) 40 MG tablet Take 1 tablet by mouth Every Night. 9/30/24   Maximino Lynn MD       Allergies:    Allergies   Allergen Reactions    Adhesive Tape Itching       Objective   Objective     Vital Signs  Temp:  [97.5 °F (36.4 °C)] 97.5 °F (36.4 °C)  Heart Rate:  [51-69] 57  Resp:  [16-18] 16  BP: (101-152)/(64-88) 130/88  SpO2:  [95 %-99 %] 99 %  on   ;   Device (Oxygen Therapy): room air  Body mass index is 33.16 kg/m².    Physical Exam  Vitals and nursing note reviewed.   Constitutional:       Appearance: Normal appearance. He is obese.   HENT:      Head: Normocephalic and atraumatic.      Right Ear: External ear normal.      Left Ear: External ear normal.      Nose: Nose normal.      Mouth/Throat:      Mouth: Mucous membranes are moist.   Eyes:      Extraocular Movements: Extraocular movements intact.   Cardiovascular:      Rate and Rhythm: Normal rate and regular rhythm.      Pulses: Normal pulses.      Heart sounds: Normal heart sounds.   Pulmonary:      Effort: Pulmonary effort is normal.      Breath  sounds: Normal breath sounds.   Abdominal:      General: Abdomen is flat. Bowel sounds are normal.      Palpations: Abdomen is soft.   Musculoskeletal:         General: Normal range of motion.      Cervical back: Normal range of motion.   Skin:     General: Skin is warm.   Neurological:      Mental Status: He is alert and oriented to person, place, and time.   Psychiatric:         Behavior: Behavior normal.         Thought Content: Thought content normal.         Judgment: Judgment normal.           Results Review:  I have personally reviewed most recent cardiac tracings, lab results, and radiology images and interpretations and agree with findings, most notably: Troponin, CMP, CBC, chest x-ray, EKG.    Results from last 7 days   Lab Units 01/18/25  1125   WBC 10*3/mm3 5.65   HEMOGLOBIN g/dL 15.2   HEMATOCRIT % 45.8   PLATELETS 10*3/mm3 167     Results from last 7 days   Lab Units 01/18/25  1228 01/18/25  1125   SODIUM mmol/L  --  140   POTASSIUM mmol/L  --  3.9   CHLORIDE mmol/L  --  107   CO2 mmol/L  --  21.0*   BUN mg/dL  --  9   CREATININE mg/dL  --  0.84   GLUCOSE mg/dL  --  116*   CALCIUM mg/dL  --  8.8   ALK PHOS U/L  --  71   ALT (SGPT) U/L  --  23   AST (SGOT) U/L  --  31   HSTROP T ng/L 7 7     Estimated Creatinine Clearance: 94.8 mL/min (by C-G formula based on SCr of 0.84 mg/dL).  Brief Urine Lab Results       None            Microbiology Results (last 10 days)       ** No results found for the last 240 hours. **            ECG/EMG Results (most recent)       Procedure Component Value Units Date/Time    ECG 12 Lead Chest Pain [[id number]] Collected: 01/18/25 1044     Updated: 01/18/25 1045     QT Interval 356 ms      QTC Interval 370 ms     Narrative:      HEART RATE=65  bpm  RR Dnucsimr=784  ms  OH Yiysnyxz=685  ms  P Horizontal Axis=39  deg  P Front Axis=23  deg  QRSD Interval=81  ms  QT Wxiqmmxi=689  ms  GIgB=590  ms  QRS Axis=-22  deg  T Wave Axis=-10  deg  - BORDERLINE ECG -  Sinus rhythm  Borderline  T abnormalities, diffuse leads  Date and Time of Study:2025-01-18 10:44:03                    XR Chest 1 View    Result Date: 1/18/2025  Impression: No active cardiopulmonary disease Electronically Signed: Manuel Hutchins  1/18/2025 11:25 AM EST  Workstation ID: [id number]       Estimated Creatinine Clearance: 94.8 mL/min (by C-G formula based on SCr of 0.84 mg/dL).    Assessment & Plan   Assessment/Plan       Active Hospital Problems    Diagnosis  POA    Chest pain [R07.9]  Yes      Resolved Hospital Problems   No resolved problems to display.       Chest pain  Lab Results   Component Value Date    TROPONINT 7 01/18/2025    TROPONINT 7 01/18/2025    TROPONINT <0.010 07/09/2022   -Troponin unremarkable  -CMP and CBC generally unremarkable  -Chest x-ray showed no active cardiopulmonary process  -EKG showed sinus rhythm with heart rate 65  -Patient had a heart cath scheduled for next month  -Cardiologist was consulted and plans to proceed with heart cath in a.m.  -Healthy heart diet and n.p.o. at midnight  -Telemetry  -Continue Plavix, aspirin, Imdur    Hypertension  -Moderately Controlled   BP Readings from Last 1 Encounters:   01/18/25 130/88   - Continue lisinopril and metoprolol  - Monitor while admitted     Obesity  -BMI 33.16  -Lifestyle vacations    Hyperlipidemia  -Continue statin        VTE Prophylaxis - No Active Pharmacologic or Mechanical VTE Prophylaxis AND No VTE Prophylaxis Contraindications Documented   - Select Appropriate VTE Prophylaxis      CODE STATUS:    There are no questions and answers to display.       This patient has been examined wearing personal protective equipment.     I discussed the patient's findings and my recommendations with patient, family, and nursing staff.      Signature:Electronically signed by CHEMO Corea, 01/18/25, 2:58 PM EST.

## 2025-01-18 NOTE — ED PROVIDER NOTES
Subjective   History of Present Illness  79-year-old male with a history of heart disease describes substernal chest pressure that woke him from sleep this morning.  He he took a nitroglycerin and it went away and then came back and he took 2 more nitroglycerin and it has now subsided.  He reports no radiating pain or diaphoresis or or shortness of breath.  States he has been having increased angina over the last few weeks and is scheduled for heart cath in 2 weeks.  Aspirin prior to arrival.  He reports no relieving or exacerbating factors  Review of Systems    Past Medical History:   Diagnosis Date    CHF (congestive heart failure)     Coronary artery disease     GERD (gastroesophageal reflux disease)     Hyperlipidemia     Hypertension     Myocardial infarction        Allergies   Allergen Reactions    Adhesive Tape Itching       Past Surgical History:   Procedure Laterality Date    CARDIAC CATHETERIZATION  01/2007    TIA: proximal LAD    CARDIAC CATHETERIZATION N/A 07/09/2022    Procedure: Left Heart Cath possible PCI, atherectomy, hemodynamic support;  Surgeon: John Perez MD;  Location: [facility] CATH INVASIVE LOCATION;  Service: Cardiovascular;  Laterality: N/A;    COLONOSCOPY  07/2023    COLONOSCOPY N/A 06/27/2023    Procedure: COLONOSCOPY with terminal ileum biopsy's and cold snare polypectomy x 10;  Surgeon: Neymar Watson MD;  Location: [facility] ENDOSCOPY;  Service: Gastroenterology;  Laterality: N/A;  diverticulosis, internal hemorrhoids    HERNIA REPAIR      TOTAL HIP ARTHROPLASTY Left        Family History   Problem Relation Age of Onset    Parkinsonism Mother     Heart disease Father     Valvular heart disease Father     Pancreatic cancer Sister 85    Dementia Sister     Dementia Sister        Social History     Socioeconomic History    Marital status:    Tobacco Use    Smoking status: Never     Passive exposure: Never    Smokeless tobacco: Never   Vaping Use    Vaping status: Never Used  "  Substance and Sexual Activity    Alcohol use: No    Drug use: No    Sexual activity: Defer     Prior to Admission medications    Medication Sig Start Date End Date Taking? Authorizing Provider   amLODIPine (NORVASC) 5 MG tablet Take 1 tablet by mouth Daily. 9/10/24   Maximino Lynn MD   aspirin 81 MG EC tablet Take 1 tablet by mouth Daily. Indications: Stable Angina Pectoris    Blayne Scales MD   clopidogrel (PLAVIX) 75 MG tablet Take 1 tablet by mouth once daily 5/13/24   Licha Zamora MD   diphenhydrAMINE-acetaminophen (TYLENOL PM)  MG tablet per tablet Take 2 tablets by mouth At Night As Needed. 1/10/14   Blayne Scales MD   ezetimibe (ZETIA) 10 MG tablet Take 1 tablet by mouth Daily. 3/27/24   Edwige Jerome MD   famotidine (PEPCID) 20 MG tablet Take 1 tablet by mouth 2 (Two) Times a Day.    Blayne Scales MD   fluticasone (FLONASE) 50 MCG/ACT nasal spray Administer 2 sprays into the nostril(s) as directed by provider Daily As Needed for Rhinitis.    Blayne Scales MD   isosorbide mononitrate (IMDUR) 60 MG 24 hr tablet Take 1 tablet by mouth 2 (Two) Times a Day. 1/13/25   Lisa Jimenez APRN   lisinopril (PRINIVIL,ZESTRIL) 5 MG tablet Take 1 tablet by mouth once daily 6/26/24   Licha Zamora MD   metoprolol succinate XL (TOPROL-XL) 25 MG 24 hr tablet Take 0.5 tablets by mouth Daily. 3/19/24   Edwige Jerome MD   nitroglycerin (NITROSTAT) 0.4 MG SL tablet 1 under the tongue as needed for angina, may repeat q5mins for up three doses 7/20/22   Licha Zamora MD   rosuvastatin (CRESTOR) 40 MG tablet Take 1 tablet by mouth Every Night. 9/30/24   Maximino Lynn MD     /71   Pulse 54   Temp 97.5 °F (36.4 °C) (Oral)   Resp 16   Ht 182.9 cm (72\")   Wt 111 kg (244 lb 7.8 oz)   SpO2 98%   BMI 33.16 kg/m²         Objective   Physical Exam  General: Well-developed well-appearing, no acute distress, alert and appropriate  Eyes:  sclera nonicteric  HEENT: Mucous " membranes moist, no mucosal swelling  Neck: Supple, no nuchal rigidity, no JVD  Respirations: Respirations nonlabored, equal breath sounds bilaterally, clear lungs  Heart regular rate and rhythm, no murmurs rubs or gallops, equal pulses bilaterally  Abdomen soft nontender nondistended, no hepatosplenomegaly, no hernia, no mass, normal bowel sounds, no CVA tenderness  Extremities no clubbing cyanosis or edema, calves are symmetric and nontender  Neuro cranial nerves grossly intact, no focal limb deficits  Psych oriented, pleasant affect  Skin no rash, brisk cap refill  Procedures           ED Course      Results for orders placed or performed during the hospital encounter of 01/18/25   ECG 12 Lead Chest Pain    Collection Time: 01/18/25 10:44 AM   Result Value Ref Range    QT Interval 356 ms    QTC Interval 370 ms   Comprehensive Metabolic Panel    Collection Time: 01/18/25 11:25 AM    Specimen: Blood   Result Value Ref Range    Glucose 116 (H) 65 - 99 mg/dL    BUN 9 8 - 23 mg/dL    Creatinine 0.84 0.76 - 1.27 mg/dL    Sodium 140 136 - 145 mmol/L    Potassium 3.9 3.5 - 5.2 mmol/L    Chloride 107 98 - 107 mmol/L    CO2 21.0 (L) 22.0 - 29.0 mmol/L    Calcium 8.8 8.6 - 10.5 mg/dL    Total Protein 6.8 6.0 - 8.5 g/dL    Albumin 4.0 3.5 - 5.2 g/dL    ALT (SGPT) 23 1 - 41 U/L    AST (SGOT) 31 1 - 40 U/L    Alkaline Phosphatase 71 39 - 117 U/L    Total Bilirubin 0.6 0.0 - 1.2 mg/dL    Globulin 2.8 gm/dL    A/G Ratio 1.4 g/dL    BUN/Creatinine Ratio 10.7 7.0 - 25.0    Anion Gap 12.0 5.0 - 15.0 mmol/L    eGFR 89.8 >60.0 mL/min/1.73   High Sensitivity Troponin T    Collection Time: 01/18/25 11:25 AM    Specimen: Blood   Result Value Ref Range    HS Troponin T 7 <22 ng/L   CBC Auto Differential    Collection Time: 01/18/25 11:25 AM    Specimen: Blood   Result Value Ref Range    WBC 5.65 3.40 - 10.80 10*3/mm3    RBC 5.20 4.14 - 5.80 10*6/mm3    Hemoglobin 15.2 13.0 - 17.7 g/dL    Hematocrit 45.8 37.5 - 51.0 %    MCV 88.1 79.0 -  97.0 fL    MCH 29.2 26.6 - 33.0 pg    MCHC 33.2 31.5 - 35.7 g/dL    RDW 13.8 12.3 - 15.4 %    RDW-SD 44.8 37.0 - 54.0 fl    MPV 10.9 6.0 - 12.0 fL    Platelets 167 140 - 450 10*3/mm3    Neutrophil % 64.8 42.7 - 76.0 %    Lymphocyte % 18.2 (L) 19.6 - 45.3 %    Monocyte % 11.7 5.0 - 12.0 %    Eosinophil % 4.4 0.3 - 6.2 %    Basophil % 0.7 0.0 - 1.5 %    Immature Grans % 0.2 0.0 - 0.5 %    Neutrophils, Absolute 3.66 1.70 - 7.00 10*3/mm3    Lymphocytes, Absolute 1.03 0.70 - 3.10 10*3/mm3    Monocytes, Absolute 0.66 0.10 - 0.90 10*3/mm3    Eosinophils, Absolute 0.25 0.00 - 0.40 10*3/mm3    Basophils, Absolute 0.04 0.00 - 0.20 10*3/mm3    Immature Grans, Absolute 0.01 0.00 - 0.05 10*3/mm3    nRBC 0.0 0.0 - 0.2 /100 WBC   Green Top (Gel)    Collection Time: 01/18/25 11:25 AM   Result Value Ref Range    Extra Tube Hold for add-ons.    Lavender Top    Collection Time: 01/18/25 11:25 AM   Result Value Ref Range    Extra Tube hold for add-on    Gold Top - SST    Collection Time: 01/18/25 11:25 AM   Result Value Ref Range    Extra Tube Hold for add-ons.    Light Blue Top    Collection Time: 01/18/25 11:25 AM   Result Value Ref Range    Extra Tube Hold for add-ons.    High Sensitivity Troponin T 1Hr    Collection Time: 01/18/25 12:28 PM    Specimen: Blood   Result Value Ref Range    HS Troponin T 7 <22 ng/L    Troponin T Numeric Delta 0 Abnormal if >/=3 ng/L     XR Chest 1 View    Result Date: 1/18/2025  Impression: No active cardiopulmonary disease Electronically Signed: Manuel Hutchins  1/18/2025 11:25 AM EST  Workstation ID: [id number]               HEART Score: 6   Shared Decision Making  I discussed the findings with the patient/patient representative who is in agreement with the treatment plan and the final disposition.  Risks and benefits of discharge and/or observation/admission were discussed: Yes                                      Medical Decision Making  Differential diagnosis including acute coronary syndrome,  pulmonary embolus, pneumonia, pneumothorax, dissection    Patient has no signs of DVT, pulmonary embolus felt to be unlikely.  He is not having back pain of dissection.  He was stable on the monitor.  EKG shows no acute ischemic change high sensitive troponin is normal.  He was resting comfortably on reexamination.  He had taken aspirin and nitroglycerin prior to arrival.  Patient was advised of findings and agreeable to the plan for observation and cardiology consultation.    Problems Addressed:  Chest pain, unspecified type: complicated acute illness or injury    Amount and/or Complexity of Data Reviewed  Labs: ordered. Decision-making details documented in ED Course.     Details: CBC no leukocytosis, high-sensitivity troponin normal, comprehensive metabolic panel essentially normal  Radiology: ordered and independent interpretation performed.     Details: My independent interpretation of chest x-ray image no pneumonia or congestive failure  ECG/medicine tests: ordered and independent interpretation performed.     Details: My EKG interpretation sinus rhythm, nonspecific T wave abnormalities, rate of 65    Risk  Prescription drug management.  Decision regarding hospitalization.        Final diagnoses:   Chest pain, unspecified type       ED Disposition  ED Disposition       ED Disposition   Decision to Admit    Condition   --    Comment   --               No follow-up provider specified.       Medication List      No changes were made to your prescriptions during this visit.            Pa Smith MD  01/18/25 [id number]

## 2025-01-19 ENCOUNTER — READMISSION MANAGEMENT (OUTPATIENT)
Dept: CALL CENTER | Facility: HOSPITAL | Age: 80
End: 2025-01-19
Payer: MEDICARE

## 2025-01-19 VITALS
WEIGHT: 238.1 LBS | HEIGHT: 72 IN | DIASTOLIC BLOOD PRESSURE: 73 MMHG | SYSTOLIC BLOOD PRESSURE: 116 MMHG | BODY MASS INDEX: 32.25 KG/M2 | HEART RATE: 70 BPM | OXYGEN SATURATION: 95 % | RESPIRATION RATE: 17 BRPM | TEMPERATURE: 97.5 F

## 2025-01-19 LAB
ANION GAP SERPL CALCULATED.3IONS-SCNC: 10 MMOL/L (ref 5–15)
BASOPHILS # BLD AUTO: 0.06 10*3/MM3 (ref 0–0.2)
BASOPHILS NFR BLD AUTO: 0.9 % (ref 0–1.5)
BUN SERPL-MCNC: 11 MG/DL (ref 8–23)
BUN/CREAT SERPL: 12.8 (ref 7–25)
CALCIUM SPEC-SCNC: 8.4 MG/DL (ref 8.6–10.5)
CHLORIDE SERPL-SCNC: 108 MMOL/L (ref 98–107)
CO2 SERPL-SCNC: 22 MMOL/L (ref 22–29)
CREAT SERPL-MCNC: 0.86 MG/DL (ref 0.76–1.27)
DEPRECATED RDW RBC AUTO: 45.2 FL (ref 37–54)
EGFRCR SERPLBLD CKD-EPI 2021: 89.2 ML/MIN/1.73
EOSINOPHIL # BLD AUTO: 0.36 10*3/MM3 (ref 0–0.4)
EOSINOPHIL NFR BLD AUTO: 5.5 % (ref 0.3–6.2)
ERYTHROCYTE [DISTWIDTH] IN BLOOD BY AUTOMATED COUNT: 14 % (ref 12.3–15.4)
GLUCOSE SERPL-MCNC: 92 MG/DL (ref 65–99)
HCT VFR BLD AUTO: 43.9 % (ref 37.5–51)
HGB BLD-MCNC: 14.6 G/DL (ref 13–17.7)
IMM GRANULOCYTES # BLD AUTO: 0.02 10*3/MM3 (ref 0–0.05)
IMM GRANULOCYTES NFR BLD AUTO: 0.3 % (ref 0–0.5)
LYMPHOCYTES # BLD AUTO: 1.34 10*3/MM3 (ref 0.7–3.1)
LYMPHOCYTES NFR BLD AUTO: 20.5 % (ref 19.6–45.3)
MCH RBC QN AUTO: 29.4 PG (ref 26.6–33)
MCHC RBC AUTO-ENTMCNC: 33.3 G/DL (ref 31.5–35.7)
MCV RBC AUTO: 88.5 FL (ref 79–97)
MONOCYTES # BLD AUTO: 0.99 10*3/MM3 (ref 0.1–0.9)
MONOCYTES NFR BLD AUTO: 15.2 % (ref 5–12)
NEUTROPHILS NFR BLD AUTO: 3.76 10*3/MM3 (ref 1.7–7)
NEUTROPHILS NFR BLD AUTO: 57.6 % (ref 42.7–76)
NRBC BLD AUTO-RTO: 0 /100 WBC (ref 0–0.2)
PLATELET # BLD AUTO: 158 10*3/MM3 (ref 140–450)
PMV BLD AUTO: 11 FL (ref 6–12)
POTASSIUM SERPL-SCNC: 3.8 MMOL/L (ref 3.5–5.2)
QT INTERVAL: 356 MS
QTC INTERVAL: 370 MS
RBC # BLD AUTO: 4.96 10*6/MM3 (ref 4.14–5.8)
SODIUM SERPL-SCNC: 140 MMOL/L (ref 136–145)
WBC NRBC COR # BLD AUTO: 6.53 10*3/MM3 (ref 3.4–10.8)

## 2025-01-19 PROCEDURE — 25010000002 LIDOCAINE 2% SOLUTION: Performed by: INTERNAL MEDICINE

## 2025-01-19 PROCEDURE — 25010000002 NITROGLYCERIN 5 MG/ML SOLUTION: Performed by: INTERNAL MEDICINE

## 2025-01-19 PROCEDURE — 25810000003 SODIUM CHLORIDE 0.9 % SOLUTION: Performed by: INTERNAL MEDICINE

## 2025-01-19 PROCEDURE — G0378 HOSPITAL OBSERVATION PER HR: HCPCS

## 2025-01-19 PROCEDURE — C1769 GUIDE WIRE: HCPCS | Performed by: INTERNAL MEDICINE

## 2025-01-19 PROCEDURE — 25010000002 FENTANYL CITRATE (PF) 100 MCG/2ML SOLUTION: Performed by: INTERNAL MEDICINE

## 2025-01-19 PROCEDURE — 25510000001 IOPAMIDOL PER 1 ML: Performed by: INTERNAL MEDICINE

## 2025-01-19 PROCEDURE — 93458 L HRT ARTERY/VENTRICLE ANGIO: CPT | Performed by: INTERNAL MEDICINE

## 2025-01-19 PROCEDURE — 25010000002 HEPARIN (PORCINE) PER 1000 UNITS: Performed by: INTERNAL MEDICINE

## 2025-01-19 PROCEDURE — 25010000002 NICARDIPINE 2.5 MG/ML SOLUTION: Performed by: INTERNAL MEDICINE

## 2025-01-19 PROCEDURE — 25010000002 MIDAZOLAM PER 1 MG: Performed by: INTERNAL MEDICINE

## 2025-01-19 PROCEDURE — 85025 COMPLETE CBC W/AUTO DIFF WBC: CPT | Performed by: NURSE PRACTITIONER

## 2025-01-19 PROCEDURE — C1894 INTRO/SHEATH, NON-LASER: HCPCS | Performed by: INTERNAL MEDICINE

## 2025-01-19 PROCEDURE — 80048 BASIC METABOLIC PNL TOTAL CA: CPT | Performed by: NURSE PRACTITIONER

## 2025-01-19 PROCEDURE — 99214 OFFICE O/P EST MOD 30 MIN: CPT | Performed by: INTERNAL MEDICINE

## 2025-01-19 RX ORDER — NITROGLYCERIN 5 MG/ML
INJECTION, SOLUTION INTRAVENOUS
Status: DISCONTINUED | OUTPATIENT
Start: 2025-01-19 | End: 2025-01-19 | Stop reason: HOSPADM

## 2025-01-19 RX ORDER — ACETAMINOPHEN 325 MG/1
650 TABLET ORAL EVERY 4 HOURS PRN
Status: DISCONTINUED | OUTPATIENT
Start: 2025-01-19 | End: 2025-01-19 | Stop reason: HOSPADM

## 2025-01-19 RX ORDER — SODIUM CHLORIDE 9 MG/ML
INJECTION, SOLUTION INTRAVENOUS
Status: COMPLETED | OUTPATIENT
Start: 2025-01-19 | End: 2025-01-19

## 2025-01-19 RX ORDER — ONDANSETRON 2 MG/ML
4 INJECTION INTRAMUSCULAR; INTRAVENOUS EVERY 6 HOURS PRN
Status: DISCONTINUED | OUTPATIENT
Start: 2025-01-19 | End: 2025-01-19 | Stop reason: HOSPADM

## 2025-01-19 RX ORDER — LIDOCAINE HYDROCHLORIDE 20 MG/ML
INJECTION, SOLUTION INFILTRATION; PERINEURAL
Status: DISCONTINUED | OUTPATIENT
Start: 2025-01-19 | End: 2025-01-19 | Stop reason: HOSPADM

## 2025-01-19 RX ORDER — FENTANYL CITRATE 50 UG/ML
INJECTION, SOLUTION INTRAMUSCULAR; INTRAVENOUS
Status: DISCONTINUED | OUTPATIENT
Start: 2025-01-19 | End: 2025-01-19 | Stop reason: HOSPADM

## 2025-01-19 RX ORDER — NITROGLYCERIN 0.4 MG/1
TABLET SUBLINGUAL
Qty: 100 TABLET | Refills: 0 | Status: SHIPPED | OUTPATIENT
Start: 2025-01-19

## 2025-01-19 RX ORDER — NITROGLYCERIN 0.4 MG/1
0.4 TABLET SUBLINGUAL
Status: DISCONTINUED | OUTPATIENT
Start: 2025-01-19 | End: 2025-01-19 | Stop reason: HOSPADM

## 2025-01-19 RX ORDER — HEPARIN SODIUM 1000 [USP'U]/ML
INJECTION, SOLUTION INTRAVENOUS; SUBCUTANEOUS
Status: DISCONTINUED | OUTPATIENT
Start: 2025-01-19 | End: 2025-01-19 | Stop reason: HOSPADM

## 2025-01-19 RX ORDER — DIPHENHYDRAMINE HCL 25 MG
25 CAPSULE ORAL EVERY 6 HOURS PRN
Status: DISCONTINUED | OUTPATIENT
Start: 2025-01-19 | End: 2025-01-19 | Stop reason: HOSPADM

## 2025-01-19 RX ORDER — MIDAZOLAM HYDROCHLORIDE 1 MG/ML
INJECTION, SOLUTION INTRAMUSCULAR; INTRAVENOUS
Status: DISCONTINUED | OUTPATIENT
Start: 2025-01-19 | End: 2025-01-19 | Stop reason: HOSPADM

## 2025-01-19 RX ORDER — IOPAMIDOL 755 MG/ML
INJECTION, SOLUTION INTRAVASCULAR
Status: DISCONTINUED | OUTPATIENT
Start: 2025-01-19 | End: 2025-01-19 | Stop reason: HOSPADM

## 2025-01-19 RX ORDER — NICARDIPINE HYDROCHLORIDE 2.5 MG/ML
INJECTION INTRAVENOUS
Status: DISCONTINUED | OUTPATIENT
Start: 2025-01-19 | End: 2025-01-19 | Stop reason: HOSPADM

## 2025-01-19 RX ORDER — ONDANSETRON 4 MG/1
4 TABLET, ORALLY DISINTEGRATING ORAL EVERY 6 HOURS PRN
Status: DISCONTINUED | OUTPATIENT
Start: 2025-01-19 | End: 2025-01-19 | Stop reason: HOSPADM

## 2025-01-19 RX ADMIN — Medication 10 ML: at 08:59

## 2025-01-19 RX ADMIN — METOPROLOL SUCCINATE 12.5 MG: 25 TABLET, EXTENDED RELEASE ORAL at 08:59

## 2025-01-19 RX ADMIN — AMLODIPINE BESYLATE 5 MG: 5 TABLET ORAL at 08:57

## 2025-01-19 RX ADMIN — ASPIRIN 81 MG: 81 TABLET, COATED ORAL at 09:11

## 2025-01-19 RX ADMIN — LISINOPRIL 5 MG: 5 TABLET ORAL at 08:59

## 2025-01-19 RX ADMIN — ISOSORBIDE MONONITRATE 60 MG: 60 TABLET, EXTENDED RELEASE ORAL at 08:59

## 2025-01-19 RX ADMIN — CLOPIDOGREL BISULFATE 75 MG: 75 TABLET ORAL at 08:59

## 2025-01-19 NOTE — PROGRESS NOTES
Referring Provider: Pa Smith MD    Reason for follow-up: Chest pain, unstable angina     Patient Care Team:  Rama Quintanilla APRN as PCP - General  Rama Quintanilla APRN as PCP - Family Medicine  Trey Kennedy MD as Consulting Physician (Cardiology)  Bright Mcgarry MD as Consulting Physician (Hematology and Oncology)      SUBJECTIVE  Resting comfortably in bed.  Anxious about cardiac catheterization today     ROS  Review of all systems negative except as indicated.    Since I have last seen, the patient has been without any chest discomfort, shortness of breath, palpitations, dizziness or syncope.  Denies having any headache, abdominal pain, nausea, vomiting, diarrhea, constipation, loss of weight or loss of appetite.  Denies having any excessive bruising, hematuria or blood in the stool.        Personal History:    Past Medical History:   Diagnosis Date    CHF (congestive heart failure)     Coronary artery disease     GERD (gastroesophageal reflux disease)     Hyperlipidemia     Hypertension     Myocardial infarction        Past Surgical History:   Procedure Laterality Date    CARDIAC CATHETERIZATION  01/2007    TIA: proximal LAD    CARDIAC CATHETERIZATION N/A 07/09/2022    Procedure: Left Heart Cath possible PCI, atherectomy, hemodynamic support;  Surgeon: John Perez MD;  Location: [facility] CATH INVASIVE LOCATION;  Service: Cardiovascular;  Laterality: N/A;    COLONOSCOPY  07/2023    COLONOSCOPY N/A 06/27/2023    Procedure: COLONOSCOPY with terminal ileum biopsy's and cold snare polypectomy x 10;  Surgeon: Neymar Watson MD;  Location: [facility] ENDOSCOPY;  Service: Gastroenterology;  Laterality: N/A;  diverticulosis, internal hemorrhoids    HERNIA REPAIR      TOTAL HIP ARTHROPLASTY Left        Family History   Problem Relation Age of Onset    Parkinsonism Mother     Heart disease Father     Valvular heart disease Father     Pancreatic cancer Sister 85    Dementia Sister     Dementia  Sister        Social History     Tobacco Use    Smoking status: Never     Passive exposure: Never    Smokeless tobacco: Never   Vaping Use    Vaping status: Never Used   Substance Use Topics    Alcohol use: No    Drug use: No        Home meds:  Prior to Admission medications    Medication Sig Start Date End Date Taking? Authorizing Provider   amLODIPine (NORVASC) 5 MG tablet Take 1 tablet by mouth Daily. 9/10/24  Yes Maximino Lynn MD   aspirin 81 MG EC tablet Take 1 tablet by mouth Daily. Indications: Stable Angina Pectoris   Yes Blayne Scales MD   clopidogrel (PLAVIX) 75 MG tablet Take 1 tablet by mouth once daily 5/13/24  Yes Licha Zamora MD   diphenhydrAMINE-acetaminophen (TYLENOL PM)  MG tablet per tablet Take 2 tablets by mouth At Night As Needed. 1/10/14  Yes Blayne Scales MD   ezetimibe (ZETIA) 10 MG tablet Take 1 tablet by mouth Daily. 3/27/24  Yes Edwige Jerome MD   famotidine (PEPCID) 20 MG tablet Take 1 tablet by mouth 2 (Two) Times a Day.  Patient taking differently: Take 1 tablet by mouth every night at bedtime.   Yes Blayne Scales MD   fluticasone (FLONASE) 50 MCG/ACT nasal spray Administer 2 sprays into the nostril(s) as directed by provider Daily As Needed for Rhinitis.   Yes Blayne Scales MD   isosorbide mononitrate (IMDUR) 60 MG 24 hr tablet Take 1 tablet by mouth 2 (Two) Times a Day. 1/13/25  Yes Lisa Jimenez APRN   lisinopril (PRINIVIL,ZESTRIL) 5 MG tablet Take 1 tablet by mouth once daily 6/26/24  Yes Licha Zamora MD   metoprolol succinate XL (TOPROL-XL) 25 MG 24 hr tablet Take 0.5 tablets by mouth Daily. 3/19/24  Yes Edwige Jerome MD   nitroglycerin (NITROSTAT) 0.4 MG SL tablet 1 under the tongue as needed for angina, may repeat q5mins for up three doses 7/20/22  Yes Licha Zamora MD   rosuvastatin (CRESTOR) 40 MG tablet Take 1 tablet by mouth Every Night. 9/30/24  Yes Maximino Lynn MD       Allergies:  Adhesive tape    Scheduled  "Meds:amLODIPine, 5 mg, Oral, Daily  aspirin, 81 mg, Oral, Daily  clopidogrel, 75 mg, Oral, Daily  famotidine, 20 mg, Oral, Nightly  isosorbide mononitrate, 60 mg, Oral, BID  lisinopril, 5 mg, Oral, Daily  metoprolol succinate XL, 12.5 mg, Oral, Daily  rosuvastatin, 40 mg, Oral, Nightly  sodium chloride, 10 mL, Intravenous, Q12H      Continuous Infusions:   PRN Meds:.  acetaminophen **OR** acetaminophen **OR** acetaminophen    aluminum-magnesium hydroxide-simethicone    senna-docusate sodium **AND** polyethylene glycol **AND** bisacodyl **AND** bisacodyl    diphenhydrAMINE    nitroglycerin    ondansetron ODT **OR** ondansetron    sodium chloride    sodium chloride    sodium chloride      OBJECTIVE    Vital Signs  Vitals:    01/18/25 1700 01/18/25 1940 01/18/25 2337 01/19/25 0330   BP: 117/75 140/78 108/70 106/73   BP Location: Left arm Left arm Left arm Left arm   Patient Position: Lying Lying Lying Lying   Pulse: 55 56 58 61   Resp: 15 12 11 12   Temp: 97.6 °F (36.4 °C) 97.5 °F (36.4 °C) 98 °F (36.7 °C) 98 °F (36.7 °C)   TempSrc: Oral Oral Oral Oral   SpO2: 95% 98% 98% 96%   Weight: 108 kg (238 lb 1.6 oz)      Height: 182.9 cm (72\")          Flowsheet Rows      Flowsheet Row First Filed Value   Admission Height 182.9 cm (72\") Documented at 01/18/2025 1035   Admission Weight 111 kg (244 lb 7.8 oz) Documented at 01/18/2025 1035              Intake/Output Summary (Last 24 hours) at 1/19/2025 0629  Last data filed at 1/18/2025 1942  Gross per 24 hour   Intake 240 ml   Output --   Net 240 ml          Telemetry: Sinus rhythm    Physical Exam:  The patient is alert, oriented and in no distress.  Obese  Vital signs as noted above.  Head and neck revealed no carotid bruits or jugular venous distention.  No thyromegaly or lymphadenopathy is present  Lungs clear.  No wheezing.  Breath sounds are normal bilaterally.  Heart normal first and second heart sounds.  No murmur. No precordial rub is present.  No gallop is " present.  Abdomen soft and nontender.  No organomegaly is present.  Extremities with good peripheral pulses without any pedal edema.  Skin warm and dry.  Musculoskeletal system is grossly normal.  CNS grossly normal.       Results Review:  I have personally reviewed the results from the time of this admission to 1/19/2025 06:29 EST and agree with these findings:  []  Laboratory  []  Microbiology  []  Radiology  []  EKG/Telemetry   []  Cardiology/Vascular   []  Pathology  []  Old records  []  Other:    Most notable findings include:    Lab Results (last 24 hours)       Procedure Component Value Units Date/Time    Basic Metabolic Panel [[id number]]  (Abnormal) Collected: 01/19/25 0143    Specimen: Blood Updated: 01/19/25 0302     Glucose 92 mg/dL      BUN 11 mg/dL      Creatinine 0.86 mg/dL      Sodium 140 mmol/L      Potassium 3.8 mmol/L      Chloride 108 mmol/L      CO2 22.0 mmol/L      Calcium 8.4 mg/dL      BUN/Creatinine Ratio 12.8     Anion Gap 10.0 mmol/L      eGFR 89.2 mL/min/1.73     Narrative:      GFR Categories in Chronic Kidney Disease (CKD)      GFR Category          GFR (mL/min/1.73)    Interpretation  G1                     90 or greater         Normal or high (1)  G2                      60-89                Mild decrease (1)  G3a                   45-59                Mild to moderate decrease  G3b                   30-44                Moderate to severe decrease  G4                    15-29                Severe decrease  G5                    14 or less           Kidney failure          (1)In the absence of evidence of kidney disease, neither GFR category G1 or G2 fulfill the criteria for CKD.    eGFR calculation 2021 CKD-EPI creatinine equation, which does not include race as a factor    CBC & Differential [[id number]]  (Abnormal) Collected: 01/19/25 0143    Specimen: Blood Updated: 01/19/25 0242    Narrative:      The following orders were created for panel order CBC & Differential.  Procedure                                Abnormality         Status                     ---------                               -----------         ------                     CBC Auto Differential[[id number]]        Abnormal            Final result                 Please view results for these tests on the individual orders.    CBC Auto Differential [[id number]]  (Abnormal) Collected: 01/19/25 0143    Specimen: Blood Updated: 01/19/25 0242     WBC 6.53 10*3/mm3      RBC 4.96 10*6/mm3      Hemoglobin 14.6 g/dL      Hematocrit 43.9 %      MCV 88.5 fL      MCH 29.4 pg      MCHC 33.3 g/dL      RDW 14.0 %      RDW-SD 45.2 fl      MPV 11.0 fL      Platelets 158 10*3/mm3      Neutrophil % 57.6 %      Lymphocyte % 20.5 %      Monocyte % 15.2 %      Eosinophil % 5.5 %      Basophil % 0.9 %      Immature Grans % 0.3 %      Neutrophils, Absolute 3.76 10*3/mm3      Lymphocytes, Absolute 1.34 10*3/mm3      Monocytes, Absolute 0.99 10*3/mm3      Eosinophils, Absolute 0.36 10*3/mm3      Basophils, Absolute 0.06 10*3/mm3      Immature Grans, Absolute 0.02 10*3/mm3      nRBC 0.0 /100 WBC     High Sensitivity Troponin T 1Hr [[id number]]  (Normal) Collected: 01/18/25 1228    Specimen: Blood Updated: 01/18/25 1259     HS Troponin T 7 ng/L      Comment: Specimen hemolyzed.  Results may be falsely decreased.        Troponin T Numeric Delta 0 ng/L     Narrative:      High Sensitive Troponin T Reference Range:  <14.0 ng/L- Negative Female for AMI  <22.0 ng/L- Negative Male for AMI  >=14 - Abnormal Female indicating possible myocardial injury.  >=22 - Abnormal Male indicating possible myocardial injury.   Clinicians would have to utilize clinical acumen, EKG, Troponin, and serial changes to determine if it is an Acute Myocardial Infarction or myocardial injury due to an underlying chronic condition.         Comprehensive Metabolic Panel [[id number]]  (Abnormal) Collected: 01/18/25 1125    Specimen: Blood Updated: 01/18/25 1156     Glucose 116 mg/dL      BUN 9  mg/dL      Creatinine 0.84 mg/dL      Sodium 140 mmol/L      Potassium 3.9 mmol/L      Chloride 107 mmol/L      CO2 21.0 mmol/L      Calcium 8.8 mg/dL      Total Protein 6.8 g/dL      Albumin 4.0 g/dL      ALT (SGPT) 23 U/L      AST (SGOT) 31 U/L      Alkaline Phosphatase 71 U/L      Total Bilirubin 0.6 mg/dL      Globulin 2.8 gm/dL      A/G Ratio 1.4 g/dL      BUN/Creatinine Ratio 10.7     Anion Gap 12.0 mmol/L      eGFR 89.8 mL/min/1.73     Narrative:      GFR Categories in Chronic Kidney Disease (CKD)      GFR Category          GFR (mL/min/1.73)    Interpretation  G1                     90 or greater         Normal or high (1)  G2                      60-89                Mild decrease (1)  G3a                   45-59                Mild to moderate decrease  G3b                   30-44                Moderate to severe decrease  G4                    15-29                Severe decrease  G5                    14 or less           Kidney failure          (1)In the absence of evidence of kidney disease, neither GFR category G1 or G2 fulfill the criteria for CKD.    eGFR calculation 2021 CKD-EPI creatinine equation, which does not include race as a factor    High Sensitivity Troponin T [[id number]]  (Normal) Collected: 01/18/25 1125    Specimen: Blood Updated: 01/18/25 1156     HS Troponin T 7 ng/L     Narrative:      High Sensitive Troponin T Reference Range:  <14.0 ng/L- Negative Female for AMI  <22.0 ng/L- Negative Male for AMI  >=14 - Abnormal Female indicating possible myocardial injury.  >=22 - Abnormal Male indicating possible myocardial injury.   Clinicians would have to utilize clinical acumen, EKG, Troponin, and serial changes to determine if it is an Acute Myocardial Infarction or myocardial injury due to an underlying chronic condition.         [place] Draw [[id number]] Collected: 01/18/25 1125    Specimen: Blood Updated: 01/18/25 1145    Narrative:      The following orders were created for panel order  [place] Draw.  Procedure                               Abnormality         Status                     ---------                               -----------         ------                     Green Top (Gel)[[id number]]                                  Final result               Lavender Top[[id number]]                                     Final result               Gold Top - SST[[id number]]                                   Final result               Light Blue Top[[id number]]                                   Final result                 Please view results for these tests on the individual orders.    Green Top (Gel) [[id number]] Collected: 01/18/25 1125    Specimen: Blood Updated: 01/18/25 1145     Extra Tube Hold for add-ons.     Comment: Auto resulted.       Lavender Top [[id number]] Collected: 01/18/25 1125    Specimen: Blood Updated: 01/18/25 1145     Extra Tube hold for add-on     Comment: Auto resulted       Gold Top - SST [[id number]] Collected: 01/18/25 1125    Specimen: Blood Updated: 01/18/25 1145     Extra Tube Hold for add-ons.     Comment: Auto resulted.       Light Blue Top [[id number]] Collected: 01/18/25 1125    Specimen: Blood Updated: 01/18/25 1145     Extra Tube Hold for add-ons.     Comment: Auto resulted       CBC & Differential [[id number]]  (Abnormal) Collected: 01/18/25 1125    Specimen: Blood Updated: 01/18/25 1133    Narrative:      The following orders were created for panel order CBC & Differential.  Procedure                               Abnormality         Status                     ---------                               -----------         ------                     CBC Auto Differential[[id number]]        Abnormal            Final result                 Please view results for these tests on the individual orders.    CBC Auto Differential [[id number]]  (Abnormal) Collected: 01/18/25 1125    Specimen: Blood Updated: 01/18/25 1133     WBC 5.65 10*3/mm3      RBC 5.20 10*6/mm3      Hemoglobin 15.2  g/dL      Hematocrit 45.8 %      MCV 88.1 fL      MCH 29.2 pg      MCHC 33.2 g/dL      RDW 13.8 %      RDW-SD 44.8 fl      MPV 10.9 fL      Platelets 167 10*3/mm3      Neutrophil % 64.8 %      Lymphocyte % 18.2 %      Monocyte % 11.7 %      Eosinophil % 4.4 %      Basophil % 0.7 %      Immature Grans % 0.2 %      Neutrophils, Absolute 3.66 10*3/mm3      Lymphocytes, Absolute 1.03 10*3/mm3      Monocytes, Absolute 0.66 10*3/mm3      Eosinophils, Absolute 0.25 10*3/mm3      Basophils, Absolute 0.04 10*3/mm3      Immature Grans, Absolute 0.01 10*3/mm3      nRBC 0.0 /100 WBC             Imaging Results (Last 24 Hours)       Procedure Component Value Units Date/Time    XR Chest 1 View [[id number]] Collected: 01/18/25 1124     Updated: 01/18/25 1127    Narrative:      XR CHEST 1 VW    Date of Exam: 1/18/2025 11:15 AM EST    Indication: Chest Pain Protocol  Chest Pain Protocol    Comparison: 7/8/2022    Findings:  Heart size and pulmonary vasculature within normal limits. Lungs clear. Costophrenic angle sharp      Impression:      Impression:  No active cardiopulmonary disease      Electronically Signed: Manuel Hutchins    1/18/2025 11:25 AM EST    Workstation ID: [id number]            LAB RESULTS (LAST 7 DAYS)    CBC  Results from last 7 days   Lab Units 01/19/25  0143 01/18/25  1125   WBC 10*3/mm3 6.53 5.65   RBC 10*6/mm3 4.96 5.20   HEMOGLOBIN g/dL 14.6 15.2   HEMATOCRIT % 43.9 45.8   MCV fL 88.5 88.1   PLATELETS 10*3/mm3 158 167       BMP  Results from last 7 days   Lab Units 01/19/25  0143 01/18/25  1125   SODIUM mmol/L 140 140   POTASSIUM mmol/L 3.8 3.9   CHLORIDE mmol/L 108* 107   CO2 mmol/L 22.0 21.0*   BUN mg/dL 11 9   CREATININE mg/dL 0.86 0.84   GLUCOSE mg/dL 92 116*       CMP   Results from last 7 days   Lab Units 01/19/25  0143 01/18/25  1125   SODIUM mmol/L 140 140   POTASSIUM mmol/L 3.8 3.9   CHLORIDE mmol/L 108* 107   CO2 mmol/L 22.0 21.0*   BUN mg/dL 11 9   CREATININE mg/dL 0.86 0.84   GLUCOSE mg/dL 92 116*    ALBUMIN g/dL  --  4.0   BILIRUBIN mg/dL  --  0.6   ALK PHOS U/L  --  71   AST (SGOT) U/L  --  31   ALT (SGPT) U/L  --  23       BNP        TROPONIN  Results from last 7 days   Lab Units 01/18/25  1228   HSTROP T ng/L 7       CoAg        Creatinine Clearance  Estimated Creatinine Clearance: 91.4 mL/min (by C-G formula based on SCr of 0.86 mg/dL).    ABG        Radiology  XR Chest 1 View    Result Date: 1/18/2025  Impression: No active cardiopulmonary disease Electronically Signed: Manuel Hutchins  1/18/2025 11:25 AM EST  Workstation ID: [id number]       EKG  I personally viewed and interpreted the patient's EKG/Telemetry data:  ECG 12 Lead Chest Pain   Preliminary Result   HEART RATE=65  bpm   RR Nrakgitm=727  ms   WY Omedhzas=655  ms   P Horizontal Axis=39  deg   P Front Axis=23  deg   QRSD Interval=81  ms   QT Zllrmurp=183  ms   MMgD=779  ms   QRS Axis=-22  deg   T Wave Axis=-10  deg   - BORDERLINE ECG -   Sinus rhythm   Borderline T abnormalities, diffuse leads   Date and Time of Study:2025-01-18 10:44:03      ECG 12 Lead Chest Pain    (Results Pending)         Echocardiogram:          Stress Test:  Results for orders placed during the hospital encounter of 03/04/20    Stress Test With Myocardial Perfusion One Day    Interpretation Summary  · Findings consistent with a normal ECG stress test.  · Left ventricular ejection fraction is normal (Calculated EF = 59%).  · Impressions are consistent with a low risk study.  · Small fixed distal inferolateral defect without any ischemia EF 60%.    No changes in EKG with lexiscan.  Correlation with myocardial perfusion images recommended         Cardiac Catheterization:  Results for orders placed during the hospital encounter of 07/08/22    Cardiac Catheterization/Vascular Study    Narrative  CARDIAC CATHETERIZATION REPORT      DATE OF PROCEDURE:  7/9/2022    INDICATION FOR PROCEDURE:    Chest pain  CAD  S/p coronary artery stenting in the past    PROCEDURE PERFORMED:    Left  heart catheterization  coronary angiography  left ventriculography    PROCEDURE COMMENTS:    After informed consent was obtained, the patient was prepped and draped in the usual sterile manner.  Mild to moderate sedation was administered.  Right femoral artery was accessed without difficulty and 6 [place] arterial sheath was inserted.  Sheath was flushed with heparinized saline.    Using 6 [place] Godfrey catheters, first left coronary artery and the right coronary was electively engaged and appropriate views were taken.  A 6 [place] JR4 catheter was used to cross aortic valve and left heart catheterization was performed.  Left ventriculography was done in GARRETT view    Patient tolerated the procedure well.    FINDINGS:    1. HEMODYNAMICS:    Aortic pressure: 97/62 mmHg    LVEDP: 0 to 5 mmHg    Gradient across aortic valve on pullback: No gradient across aortic valve      2. LEFT VENTRICULOGRAPHY: 55 to 60%      3. CORONARY ANGIOGRAPHY:    A: Left main coronary artery: Short and normal    B: Left anterior descending artery: 25 to 30% in the mid segment of LAD.  D3 branch of LAD has ostial disease up to 25%.  D2 branch of LAD has mild disease but it is a small caliber vessel    C: Left circumflex coronary artery: LCx has 25 to 30% stenosis in the midsegment no high-grade stenosis    D: Right coronary artery: RCA is large and dominant and has 30 to 40% stenosis in mid to distal segment.    SUMMARY:    Nonobstructive CAD  Preserved left ventricle    RECOMMENDATIONS:    Medical treatment         Other:         ASSESSMENT & PLAN:    Principal Problem:    Unstable angina  Active Problems:    Chest pain    Unstable angina  Coronary artery disease  Previous PCI to the LAD with slow flow in the LAD  ECG with normal acute ischemia.  High-sensitivity troponin negative x 2  Continue amlodipine, Imdur and metoprolol  Continue dual antiplatelet therapy and high intensity statin  Proceed with cardiac catheterization. Discussed the risk  and benefits of the procedure.   He was already scheduled for outpatient cardiac cath.    Hypertension  Heart rate and blood pressure well-controlled     Hyperlipidemia  Continue Crestor  LDL 55, HDL 30, triglyceride 139 and total cholesterol 110     Lymphoma  In remission     Dyspnea on exertion  Echocardiogram shows preserved LV function, grade 1 diastolic dysfunction and no significant valvular abnormalities.  RVSP 38 mmHg.     Obesity  BMI is 32.  He weighs 238 pounds.  Lifestyle modifications recommended.  Screening and treatment for sleep apnea recommended.      Maximino Lynn MD  01/19/25  06:29 EST

## 2025-01-19 NOTE — OUTREACH NOTE
Prep Survey      Flowsheet Row Responses   [facility] patient discharged from? [place]   Is LACE score < 7 ? Yes   Eligibility [facility]   Date of Admission 01/18/25   Date of Discharge 01/19/25   Discharge Disposition Home or Self Care   Discharge diagnosis Unstable angina   Does the patient have one of the following disease processes/diagnoses(primary or secondary)? Other   Does the patient have Home health ordered? No   Is there a DME ordered? No   Prep survey completed? Yes            Emily COOK - Registered Nurse

## 2025-01-19 NOTE — PLAN OF CARE
Goal Outcome Evaluation:  Plan of Care Reviewed With: patient        Progress: improving  Outcome Evaluation: Awaiting Heart Cath today. Will cont to monitor.

## 2025-01-19 NOTE — DISCHARGE SUMMARY
[facility]    Quintanilla RamaCHEMO    CHIEF COMPLAINT:     Chest pain     HISTORY OF PRESENT ILLNESS:    Chest Pain         ED 01/18/2025  79-year-old male with a history of heart disease describes substernal chest pressure that woke him from sleep this morning.  He he took a nitroglycerin and it went away and then came back and he took 2 more nitroglycerin and it has now subsided.  He reports no radiating pain or diaphoresis or or shortness of breath.  States he has been having increased angina over the last few weeks and is scheduled for heart cath in 2 weeks.  Aspirin prior to arrival.  He reports no relieving or exacerbating factors      Observation 01/18/2025  Patient agrees with HPI noted above and confirms that he had significant chest pain this morning that lasted about 3 hours and subsided after 3 nitroglycerin.  He has a stress test scheduled for February and follows up with Dr. Lynn.  He is currently asymptomatic.  He denies any shortness of breath.       Observation 01/19/2025  Patient feeling well. No chest pain since admitted to obs and cleared by cardiologist for discharge.     Past Medical History:   Diagnosis Date    CHF (congestive heart failure)     Coronary artery disease     GERD (gastroesophageal reflux disease)     Hyperlipidemia     Hypertension     Myocardial infarction      Past Surgical History:   Procedure Laterality Date    CARDIAC CATHETERIZATION  01/2007    TIA: proximal LAD    CARDIAC CATHETERIZATION N/A 07/09/2022    Procedure: Left Heart Cath possible PCI, atherectomy, hemodynamic support;  Surgeon: John Perez MD;  Location: [facility] CATH INVASIVE LOCATION;  Service: Cardiovascular;  Laterality: N/A;    COLONOSCOPY  07/2023    COLONOSCOPY N/A 06/27/2023    Procedure: COLONOSCOPY with terminal ileum biopsy's and cold snare polypectomy x 10;  Surgeon: Neymar Watson MD;  Location: [facility] ENDOSCOPY;  Service: Gastroenterology;  Laterality: N/A;   diverticulosis, internal hemorrhoids    HERNIA REPAIR      TOTAL HIP ARTHROPLASTY Left      Family History   Problem Relation Age of Onset    Parkinsonism Mother     Heart disease Father     Valvular heart disease Father     Pancreatic cancer Sister 85    Dementia Sister     Dementia Sister      Social History     Tobacco Use    Smoking status: Never     Passive exposure: Never    Smokeless tobacco: Never   Vaping Use    Vaping status: Never Used   Substance Use Topics    Alcohol use: No    Drug use: No     Medications Prior to Admission   Medication Sig Dispense Refill Last Dose/Taking    amLODIPine (NORVASC) 5 MG tablet Take 1 tablet by mouth Daily. 90 tablet 3 1/17/2025 Evening    aspirin 81 MG EC tablet Take 1 tablet by mouth Daily. Indications: Stable Angina Pectoris   1/18/2025 Morning    clopidogrel (PLAVIX) 75 MG tablet Take 1 tablet by mouth once daily 90 tablet 3 1/18/2025 Morning    diphenhydrAMINE-acetaminophen (TYLENOL PM)  MG tablet per tablet Take 2 tablets by mouth At Night As Needed.   1/17/2025 Evening    ezetimibe (ZETIA) 10 MG tablet Take 1 tablet by mouth Daily. 90 tablet 3 1/17/2025 Evening    famotidine (PEPCID) 20 MG tablet Take 1 tablet by mouth 2 (Two) Times a Day. (Patient taking differently: Take 1 tablet by mouth every night at bedtime.)   1/17/2025 Evening    fluticasone (FLONASE) 50 MCG/ACT nasal spray Administer 2 sprays into the nostril(s) as directed by provider Daily As Needed for Rhinitis.   1/18/2025 Morning    isosorbide mononitrate (IMDUR) 60 MG 24 hr tablet Take 1 tablet by mouth 2 (Two) Times a Day. 90 tablet 3 1/18/2025 Morning    lisinopril (PRINIVIL,ZESTRIL) 5 MG tablet Take 1 tablet by mouth once daily 90 tablet 3 1/18/2025 Morning    metoprolol succinate XL (TOPROL-XL) 25 MG 24 hr tablet Take 0.5 tablets by mouth Daily. 90 tablet 3 1/18/2025 Morning    nitroglycerin (NITROSTAT) 0.4 MG SL tablet 1 under the tongue as needed for angina, may repeat q5mins for up three  doses 100 tablet 11 1/18/2025 Morning    rosuvastatin (CRESTOR) 40 MG tablet Take 1 tablet by mouth Every Night. 90 tablet 3 1/17/2025 Evening     Allergies:  Adhesive tape    Immunization History   Administered Date(s) Administered    COVID-19 (MODERNA) 12YRS+ (SPIKEVAX) 12/13/2023, 09/20/2024    COVID-19 (PFIZER) BIVALENT 12+YRS 10/11/2022    COVID-19 (PFIZER) Purple Cap Monovalent 02/08/2021, 03/01/2021, 10/13/2021    Fluad Quad 65+ 10/18/2019, 09/17/2020    Fluzone High-Dose 65+YRS 10/09/2015, 10/17/2016, 10/11/2017, 09/24/2018, 09/20/2024    Fluzone High-Dose 65+yrs 10/28/2021, 10/11/2022, 11/09/2023    H1N1 Inj 11/17/2009    Pneumococcal Conjugate 13-Valent (PCV13) 10/26/2016    Pneumococcal Polysaccharide (PPSV23) 11/25/2019           REVIEW OF SYSTEMS:    Review of Systems   Cardiovascular:  Positive for chest pain.       Review of Systems   Constitutional: Negative for diaphoresis and malaise/fatigue.   Cardiovascular:  Positive for chest pain. Negative for dyspnea on exertion and palpitations.   Respiratory:  Negative for shortness of breath.    Gastrointestinal:  Negative for nausea and vomiting.   All other systems reviewed and are negative.    Vital Signs  Temp:  [97.4 °F (36.3 °C)-98 °F (36.7 °C)] 97.4 °F (36.3 °C)  Heart Rate:  [55-81] 67  Resp:  [11-17] 17  BP: (101-140)/(68-88) 118/75          Physical Exam:  Physical Exam  Vitals and nursing note reviewed.   Constitutional:       Appearance: Normal appearance. He is obese.   HENT:      Head: Normocephalic and atraumatic.      Right Ear: External ear normal.      Left Ear: External ear normal.      Nose: Nose normal.      Mouth/Throat:      Mouth: Mucous membranes are moist.   Eyes:      Extraocular Movements: Extraocular movements intact.   Cardiovascular:      Rate and Rhythm: Normal rate and regular rhythm.      Pulses: Normal pulses.      Heart sounds: Normal heart sounds.   Pulmonary:      Effort: Pulmonary effort is normal.      Breath sounds:  Normal breath sounds.   Abdominal:      General: Abdomen is flat. Bowel sounds are normal.      Palpations: Abdomen is soft.   Musculoskeletal:         General: Normal range of motion.      Cervical back: Normal range of motion.   Skin:     General: Skin is warm.   Neurological:      Mental Status: He is alert and oriented to person, place, and time.   Psychiatric:         Behavior: Behavior normal.         Thought Content: Thought content normal.         Judgment: Judgment normal.           Emotional Behavior:    Normal    Debilities:   None  Results Review:    I reviewed the patient's new clinical results.  Lab Results (most recent)       Procedure Component Value Units Date/Time    Basic Metabolic Panel [[id number]]  (Abnormal) Collected: 01/19/25 0143    Specimen: Blood Updated: 01/19/25 0302     Glucose 92 mg/dL      BUN 11 mg/dL      Creatinine 0.86 mg/dL      Sodium 140 mmol/L      Potassium 3.8 mmol/L      Chloride 108 mmol/L      CO2 22.0 mmol/L      Calcium 8.4 mg/dL      BUN/Creatinine Ratio 12.8     Anion Gap 10.0 mmol/L      eGFR 89.2 mL/min/1.73     Narrative:      GFR Categories in Chronic Kidney Disease (CKD)      GFR Category          GFR (mL/min/1.73)    Interpretation  G1                     90 or greater         Normal or high (1)  G2                      60-89                Mild decrease (1)  G3a                   45-59                Mild to moderate decrease  G3b                   30-44                Moderate to severe decrease  G4                    15-29                Severe decrease  G5                    14 or less           Kidney failure          (1)In the absence of evidence of kidney disease, neither GFR category G1 or G2 fulfill the criteria for CKD.    eGFR calculation 2021 CKD-EPI creatinine equation, which does not include race as a factor    CBC & Differential [[id number]]  (Abnormal) Collected: 01/19/25 0143    Specimen: Blood Updated: 01/19/25 0242    Narrative:      The  following orders were created for panel order CBC & Differential.  Procedure                               Abnormality         Status                     ---------                               -----------         ------                     CBC Auto Differential[[id number]]        Abnormal            Final result                 Please view results for these tests on the individual orders.    CBC Auto Differential [[id number]]  (Abnormal) Collected: 01/19/25 0143    Specimen: Blood Updated: 01/19/25 0242     WBC 6.53 10*3/mm3      RBC 4.96 10*6/mm3      Hemoglobin 14.6 g/dL      Hematocrit 43.9 %      MCV 88.5 fL      MCH 29.4 pg      MCHC 33.3 g/dL      RDW 14.0 %      RDW-SD 45.2 fl      MPV 11.0 fL      Platelets 158 10*3/mm3      Neutrophil % 57.6 %      Lymphocyte % 20.5 %      Monocyte % 15.2 %      Eosinophil % 5.5 %      Basophil % 0.9 %      Immature Grans % 0.3 %      Neutrophils, Absolute 3.76 10*3/mm3      Lymphocytes, Absolute 1.34 10*3/mm3      Monocytes, Absolute 0.99 10*3/mm3      Eosinophils, Absolute 0.36 10*3/mm3      Basophils, Absolute 0.06 10*3/mm3      Immature Grans, Absolute 0.02 10*3/mm3      nRBC 0.0 /100 WBC     High Sensitivity Troponin T 1Hr [[id number]]  (Normal) Collected: 01/18/25 1228    Specimen: Blood Updated: 01/18/25 1259     HS Troponin T 7 ng/L      Comment: Specimen hemolyzed.  Results may be falsely decreased.        Troponin T Numeric Delta 0 ng/L     Narrative:      High Sensitive Troponin T Reference Range:  <14.0 ng/L- Negative Female for AMI  <22.0 ng/L- Negative Male for AMI  >=14 - Abnormal Female indicating possible myocardial injury.  >=22 - Abnormal Male indicating possible myocardial injury.   Clinicians would have to utilize clinical acumen, EKG, Troponin, and serial changes to determine if it is an Acute Myocardial Infarction or myocardial injury due to an underlying chronic condition.         Comprehensive Metabolic Panel [[id number]]  (Abnormal) Collected:  01/18/25 1125    Specimen: Blood Updated: 01/18/25 1156     Glucose 116 mg/dL      BUN 9 mg/dL      Creatinine 0.84 mg/dL      Sodium 140 mmol/L      Potassium 3.9 mmol/L      Chloride 107 mmol/L      CO2 21.0 mmol/L      Calcium 8.8 mg/dL      Total Protein 6.8 g/dL      Albumin 4.0 g/dL      ALT (SGPT) 23 U/L      AST (SGOT) 31 U/L      Alkaline Phosphatase 71 U/L      Total Bilirubin 0.6 mg/dL      Globulin 2.8 gm/dL      A/G Ratio 1.4 g/dL      BUN/Creatinine Ratio 10.7     Anion Gap 12.0 mmol/L      eGFR 89.8 mL/min/1.73     Narrative:      GFR Categories in Chronic Kidney Disease (CKD)      GFR Category          GFR (mL/min/1.73)    Interpretation  G1                     90 or greater         Normal or high (1)  G2                      60-89                Mild decrease (1)  G3a                   45-59                Mild to moderate decrease  G3b                   30-44                Moderate to severe decrease  G4                    15-29                Severe decrease  G5                    14 or less           Kidney failure          (1)In the absence of evidence of kidney disease, neither GFR category G1 or G2 fulfill the criteria for CKD.    eGFR calculation 2021 CKD-EPI creatinine equation, which does not include race as a factor    High Sensitivity Troponin T [[id number]]  (Normal) Collected: 01/18/25 1125    Specimen: Blood Updated: 01/18/25 1156     HS Troponin T 7 ng/L     Narrative:      High Sensitive Troponin T Reference Range:  <14.0 ng/L- Negative Female for AMI  <22.0 ng/L- Negative Male for AMI  >=14 - Abnormal Female indicating possible myocardial injury.  >=22 - Abnormal Male indicating possible myocardial injury.   Clinicians would have to utilize clinical acumen, EKG, Troponin, and serial changes to determine if it is an Acute Myocardial Infarction or myocardial injury due to an underlying chronic condition.         [place] Draw [[id number]] Collected: 01/18/25 1125    Specimen: Blood  Updated: 01/18/25 1145    Narrative:      The following orders were created for panel order [place] Draw.  Procedure                               Abnormality         Status                     ---------                               -----------         ------                     Green Top (Gel)[[id number]]                                  Final result               Lavender Top[[id number]]                                     Final result               Gold Top - SST[[id number]]                                   Final result               Light Blue Top[[id number]]                                   Final result                 Please view results for these tests on the individual orders.    Green Top (Gel) [[id number]] Collected: 01/18/25 1125    Specimen: Blood Updated: 01/18/25 1145     Extra Tube Hold for add-ons.     Comment: Auto resulted.       Lavender Top [[id number]] Collected: 01/18/25 1125    Specimen: Blood Updated: 01/18/25 1145     Extra Tube hold for add-on     Comment: Auto resulted       Gold Top - SST [[id number]] Collected: 01/18/25 1125    Specimen: Blood Updated: 01/18/25 1145     Extra Tube Hold for add-ons.     Comment: Auto resulted.       Light Blue Top [[id number]] Collected: 01/18/25 1125    Specimen: Blood Updated: 01/18/25 1145     Extra Tube Hold for add-ons.     Comment: Auto resulted       CBC & Differential [[id number]]  (Abnormal) Collected: 01/18/25 1125    Specimen: Blood Updated: 01/18/25 1133    Narrative:      The following orders were created for panel order CBC & Differential.  Procedure                               Abnormality         Status                     ---------                               -----------         ------                     CBC Auto Differential[[id number]]        Abnormal            Final result                 Please view results for these tests on the individual orders.    CBC Auto Differential [[id number]]  (Abnormal) Collected: 01/18/25 1125    Specimen:  Blood Updated: 01/18/25 1133     WBC 5.65 10*3/mm3      RBC 5.20 10*6/mm3      Hemoglobin 15.2 g/dL      Hematocrit 45.8 %      MCV 88.1 fL      MCH 29.2 pg      MCHC 33.2 g/dL      RDW 13.8 %      RDW-SD 44.8 fl      MPV 10.9 fL      Platelets 167 10*3/mm3      Neutrophil % 64.8 %      Lymphocyte % 18.2 %      Monocyte % 11.7 %      Eosinophil % 4.4 %      Basophil % 0.7 %      Immature Grans % 0.2 %      Neutrophils, Absolute 3.66 10*3/mm3      Lymphocytes, Absolute 1.03 10*3/mm3      Monocytes, Absolute 0.66 10*3/mm3      Eosinophils, Absolute 0.25 10*3/mm3      Basophils, Absolute 0.04 10*3/mm3      Immature Grans, Absolute 0.01 10*3/mm3      nRBC 0.0 /100 WBC             Imaging Results (Most Recent)       Procedure Component Value Units Date/Time    XR Chest 1 View [[id number]] Collected: 01/18/25 1124     Updated: 01/18/25 1127    Narrative:      XR CHEST 1 VW    Date of Exam: 1/18/2025 11:15 AM EST    Indication: Chest Pain Protocol  Chest Pain Protocol    Comparison: 7/8/2022    Findings:  Heart size and pulmonary vasculature within normal limits. Lungs clear. Costophrenic angle sharp      Impression:      Impression:  No active cardiopulmonary disease      Electronically Signed: Manuel Hutchins    1/18/2025 11:25 AM EST    Workstation ID: [id number]          reviewed    ECG/EMG Results (most recent)       Procedure Component Value Units Date/Time    ECG 12 Lead Chest Pain [[id number]] Collected: 01/18/25 1044     Updated: 01/18/25 1045     QT Interval 356 ms      QTC Interval 370 ms     Narrative:      HEART RATE=65  bpm  RR Mpvbhiym=524  ms  NV Mfzynbxo=066  ms  P Horizontal Axis=39  deg  P Front Axis=23  deg  QRSD Interval=81  ms  QT Mfdqrobf=782  ms  NAtL=376  ms  QRS Axis=-22  deg  T Wave Axis=-10  deg  - BORDERLINE ECG -  Sinus rhythm  Borderline T abnormalities, diffuse leads  Date and Time of Study:2025-01-18 10:44:03          reviewed            Microbiology Results (last 10 days)       ** No results  found for the last 240 hours. **            Assessment & Plan     Unstable angina    Chest pain             Chest pain        Lab Results   Component Value Date     TROPONINT 7 01/18/2025     TROPONINT 7 01/18/2025     TROPONINT <0.010 07/09/2022   -Troponin unremarkable  -CMP and CBC generally unremarkable  -Chest x-ray showed no active cardiopulmonary process  -EKG showed sinus rhythm with heart rate 65  -Patient had a heart cath scheduled for next month  -Cardiologist was consulted and patient underwent heart cath on 01/19 which showed non obstructive CAD and patent stent.   -Cardiologist recommended to increase antianginal medication on cath report, but clarified with Dr Lynn and hema dose will remain the same for now.    -Healthy heart diet   -Telemetry  -Continue Plavix, asa and imdur   -Consider outpatient f/u with GI if symptoms persist.      Hypertension  -Moderately Controlled   BP Readings from Last 1 Encounters:   01/19/25 118/75         BP Readings from Last 1 Encounters:   01/18/25 130/88   - Continue lisinopril and metoprolol  - Monitor while admitted      Obesity  -BMI 33.16  -Lifestyle vacations     Hyperlipidemia  -Continue statin    I discussed the patients findings and my recommendations with patient and nursing staff.     Discharge Diagnosis:      Unstable angina    Chest pain      Hospital Course  Patient is a 79 y.o. male presented with chest pain as noted in HPI above.  He was already established with cardiologist and had a heart cath planned for outpatient for next month.  Troponin, proBNP, CMP, CBC, chest x-ray and EKG unremarkable.  Cardiologist was consulted and patient underwent a heart cath without any complications.  Heart cath showed no obstructive coronary artery disease and patient was instructed to remain on antianginal medication at current dose and should consider GI causes if pain persists.  He was given a referral to GI outpatient and was instructed to follow-up with  cardiologist in 2 weeks. At this time, patient felt to be in good condition for discharge with close follow up with PCP. Instructed to take all medications as prescribed and to return to ED if any concerning signs/symptoms. All test/lab results were discussed with patient. All questions were answered and patient verbalizes understanding.   .      Past Medical History:     Past Medical History:   Diagnosis Date    CHF (congestive heart failure)     Coronary artery disease     GERD (gastroesophageal reflux disease)     Hyperlipidemia     Hypertension     Myocardial infarction        Past Surgical History:     Past Surgical History:   Procedure Laterality Date    CARDIAC CATHETERIZATION  01/2007    TIA: proximal LAD    CARDIAC CATHETERIZATION N/A 07/09/2022    Procedure: Left Heart Cath possible PCI, atherectomy, hemodynamic support;  Surgeon: John Perez MD;  Location: [facility] CATH INVASIVE LOCATION;  Service: Cardiovascular;  Laterality: N/A;    COLONOSCOPY  07/2023    COLONOSCOPY N/A 06/27/2023    Procedure: COLONOSCOPY with terminal ileum biopsy's and cold snare polypectomy x 10;  Surgeon: Neymar Watson MD;  Location: [facility] ENDOSCOPY;  Service: Gastroenterology;  Laterality: N/A;  diverticulosis, internal hemorrhoids    HERNIA REPAIR      TOTAL HIP ARTHROPLASTY Left        Social History:   Social History     Socioeconomic History    Marital status:    Tobacco Use    Smoking status: Never     Passive exposure: Never    Smokeless tobacco: Never   Vaping Use    Vaping status: Never Used   Substance and Sexual Activity    Alcohol use: No    Drug use: No    Sexual activity: Defer       Procedures Performed    Procedure(s):  Left Heart Cath  -------------------       Consults:   Consults       Date and Time Order Name Status Description    1/18/2025  1:24 PM Inpatient Cardiology Consult Completed             Condition on Discharge:     Stable    Discharge Disposition  Home or Self Care    Discharge  Medications     Discharge Medications        Continue These Medications        Instructions Start Date   amLODIPine 5 MG tablet  Commonly known as: NORVASC   5 mg, Oral, Daily      aspirin 81 MG EC tablet   81 mg, Daily      clopidogrel 75 MG tablet  Commonly known as: PLAVIX   Take 1 tablet by mouth once daily      diphenhydrAMINE-acetaminophen  MG tablet per tablet  Commonly known as: TYLENOL PM   2 tablets, Nightly PRN      ezetimibe 10 MG tablet  Commonly known as: ZETIA   10 mg, Oral, Daily      famotidine 20 MG tablet  Commonly known as: PEPCID   20 mg, 2 Times Daily      fluticasone 50 MCG/ACT nasal spray  Commonly known as: FLONASE   2 sprays, Daily PRN      isosorbide mononitrate 60 MG 24 hr tablet  Commonly known as: IMDUR   60 mg, Oral, 2 Times Daily      lisinopril 5 MG tablet  Commonly known as: PRINIVIL,ZESTRIL   5 mg, Oral, Daily      metoprolol succinate XL 25 MG 24 hr tablet  Commonly known as: TOPROL-XL   12.5 mg, Oral, Daily      nitroglycerin 0.4 MG SL tablet  Commonly known as: NITROSTAT   1 under the tongue as needed for angina, may repeat q5mins for up three doses      rosuvastatin 40 MG tablet  Commonly known as: CRESTOR   40 mg, Oral, Nightly               Discharge Diet:   Diet Instructions       Diet: Cardiac Diets; Healthy Heart (2-3 Na+); Regular (IDDSI 7); Thin (IDDSI 0)      Discharge Diet: Cardiac Diets    Cardiac Diet: Healthy Heart (2-3 Na+)    Texture: Regular (IDDSI 7)    Fluid Consistency: Thin (IDDSI 0)    Diet: Cardiac Diets; Healthy Heart (2-3 Na+); Regular (IDDSI 7); Thin (IDDSI 0)      Discharge Diet: Cardiac Diets    Cardiac Diet: Healthy Heart (2-3 Na+)    Texture: Regular (IDDSI 7)    Fluid Consistency: Thin (IDDSI 0)            Activity at Discharge:     Follow-up Appointments  Future Appointments   Date Time Provider Department Center   4/21/2025 11:30 AM Rama Quintanilla APRN [facility]   4/29/2025 11:00 AM Brihgt Mcgarry MD [facility]      Additional Instructions for the Follow-ups that You Need to Schedule       Discharge Follow-up with PCP   As directed       Currently Documented PCP:    Rama Quintanilla APRN    PCP Phone Number:    [phone]     Follow Up Details: 5-7 days        Discharge Follow-up with PCP   As directed       Currently Documented PCP:    Rama Quintanilla APRN    PCP Phone Number:    [phone]     Follow Up Details: 5-7 days        Discharge Follow-up with Specified Provider: Cardiologist; 2 Weeks   As directed      To: Cardiologist   Follow Up: 2 Weeks        Discharge Follow-up with Specified Provider: Cardiologist; 2 Weeks   As directed      To: Cardiologist   Follow Up: 2 Weeks                Test Results Pending at Discharge  Pending Results       None             Risk for Readmission (LACE) Score: 1 (1/19/2025  6:00 AM)      Greater than 30 minutes spent in discharge activities for this patient    Signature:Electronically signed by CHEMO Corea, 01/19/25, 10:58 AM EST.

## 2025-01-20 ENCOUNTER — TRANSITIONAL CARE MANAGEMENT TELEPHONE ENCOUNTER (OUTPATIENT)
Dept: CALL CENTER | Facility: HOSPITAL | Age: 80
End: 2025-01-20
Payer: MEDICARE

## 2025-01-20 NOTE — OUTREACH NOTE
Call Center TCM Note      Flowsheet Row Responses   [facility] facility patient discharged from? [facility]   Does the patient have one of the following disease processes/diagnoses(primary or secondary)? Other   TCM attempt successful? No   Unsuccessful attempts Attempt 1   Call Status Left message            Verena Alvarez RN    1/20/2025, 12:06 EST

## 2025-01-20 NOTE — CASE MANAGEMENT/SOCIAL WORK
Case Management Discharge Note      Final Note: Routine home.         Selected Continued Care - Discharged on 1/19/2025 Admission date: 1/18/2025 - Discharge disposition: Home or Self Care       Transportation Services  Private: Car    Final Discharge Disposition Code: 01 - home or self-care

## 2025-01-20 NOTE — OUTREACH NOTE
Call Center TCM Note      Flowsheet Row Responses   [facility] patient discharged from? [facility]   Does the patient have one of the following disease processes/diagnoses(primary or secondary)? Other   TCM attempt successful? Yes   Call start time 1745   Call end time 1750   Discharge diagnosis Unstable angina   Comments Advised to make cardiologist/GI fu appts   Does the patient have an appointment with their PCP within 7-14 days of discharge? No appointments available   Nursing Interventions Routed TCM call to PCP office, PCP office requested to make appointment - message sent   Did the patient receive a copy of their discharge instructions? Yes   Nursing interventions Reviewed instructions with patient   What is the patient's perception of their health status since discharge? Improving   Is the patient/caregiver able to teach back signs and symptoms related to disease process for when to call PCP? Yes   Is the patient/caregiver able to teach back signs and symptoms related to disease process for when to call 911? Yes   Is the patient/caregiver able to teach back the hierarchy of who to call/visit for symptoms/problems? PCP, Specialist, Home health nurse, Urgent Care, ED, 911 Yes   If the patient is a current smoker, are they able to teach back resources for cessation? Not a smoker   TCM call completed? Yes   Wrap up additional comments Pt denies any chest pain. No medication changes/issues. RN sent message to PCP office as there are no appts that satisfy TCM. Pt advised to make a cardiology fu appt.   Call end time 1750   Would this patient benefit from a Referral to Amb Social Work? No   Is the patient interested in additional calls from an ambulatory ? No            Yoli Pastor RN    1/20/2025, 17:52 EST

## 2025-01-27 ENCOUNTER — OFFICE VISIT (OUTPATIENT)
Dept: FAMILY MEDICINE CLINIC | Facility: CLINIC | Age: 80
End: 2025-01-27
Payer: MEDICARE

## 2025-01-27 VITALS
BODY MASS INDEX: 32.91 KG/M2 | SYSTOLIC BLOOD PRESSURE: 134 MMHG | TEMPERATURE: 97.3 F | DIASTOLIC BLOOD PRESSURE: 86 MMHG | WEIGHT: 243 LBS | HEIGHT: 72 IN | HEART RATE: 81 BPM | OXYGEN SATURATION: 94 %

## 2025-01-27 DIAGNOSIS — E66.9 OBESITY (BMI 30-39.9): ICD-10-CM

## 2025-01-27 DIAGNOSIS — I25.111 CORONARY ARTERY DISEASE INVOLVING NATIVE CORONARY ARTERY OF NATIVE HEART WITH ANGINA PECTORIS WITH DOCUMENTED SPASM: Chronic | ICD-10-CM

## 2025-01-27 DIAGNOSIS — C83.08 SMALL B-CELL LYMPHOMA OF LYMPH NODES OF MULTIPLE REGIONS: ICD-10-CM

## 2025-01-27 DIAGNOSIS — I20.0 UNSTABLE ANGINA: Primary | ICD-10-CM

## 2025-01-27 PROCEDURE — 3075F SYST BP GE 130 - 139MM HG: CPT | Performed by: NURSE PRACTITIONER

## 2025-01-27 PROCEDURE — 99213 OFFICE O/P EST LOW 20 MIN: CPT | Performed by: NURSE PRACTITIONER

## 2025-01-27 PROCEDURE — 3079F DIAST BP 80-89 MM HG: CPT | Performed by: NURSE PRACTITIONER

## 2025-01-27 PROCEDURE — 1126F AMNT PAIN NOTED NONE PRSNT: CPT | Performed by: NURSE PRACTITIONER

## 2025-01-27 NOTE — PROGRESS NOTES
"    Donald Navarro is a 79 y.o. male.     History of Present Illness  79-year-old white male with history of CAD/MI/stents, hypertension, hyperlipidemia, arthritis, spinal stenosis, lumbar DDD, BPH and non-Hodgkin's B-cell lymphoma in remission comes in today post hospitalization for chest pain    Blood pressure 134/86 heart rate 80 he denies any tachycardia dizziness or dyspnea.  He has been having heart spasms and they increased his Imdur to 60 mg twice a day about 4 days ago but so far he states it has not helped.  I did remind patient that he has a glycerin they are to use and should not suffer.  He states episodes always happen at night and they wake him up.  He did have a cardiac cath in January that showed his LAD stent was patent and no other serious blockages which is good.  He has a follow-up visit next week with cardiology to discuss these issues    He is lymphomas currently in remission they do have a colonoscopy is planned for next March however oncology states they made wait on that he sees oncology in April    Weight is 243 with a BMI of 32.9.  Has had 4 COVID vaccines and is up-to-date on influenza and eye exams.  PSA is due in November 2025.  As mentioned above colonoscopy is planned for March 2026 but that will be up to oncology          Keep appointment with cardiology  Use nitroglycerin for heart spasms as needed  If you use more than 1 nitroglycerin monitor blood pressure  Keep appointment in April with oncology       The following portions of the patient's history were reviewed and updated as appropriate: allergies, current medications, past family history, past medical history, past social history, past surgical history, and problem list.    Vitals:    01/27/25 1130   BP: 134/86   BP Location: Right arm   Patient Position: Sitting   Cuff Size: Adult   Pulse: 81   Temp: 97.3 °F (36.3 °C)   TempSrc: Infrared   SpO2: 94%   Weight: 110 kg (243 lb)   Height: 182.9 cm (72.01\")       Past Medical " History:   Diagnosis Date    CHF (congestive heart failure)     Coronary artery disease     GERD (gastroesophageal reflux disease)     Hyperlipidemia     Hypertension     Myocardial infarction      Past Surgical History:   Procedure Laterality Date    CARDIAC CATHETERIZATION  01/2007    TIA: proximal LAD    CARDIAC CATHETERIZATION N/A 07/09/2022    Procedure: Left Heart Cath possible PCI, atherectomy, hemodynamic support;  Surgeon: John Perez MD;  Location: [facility] CATH INVASIVE LOCATION;  Service: Cardiovascular;  Laterality: N/A;    CARDIAC CATHETERIZATION Right 1/19/2025    Procedure: Left Heart Cath;  Surgeon: Maximino Lynn MD;  Location: [facility] CATH INVASIVE LOCATION;  Service: Cardiovascular;  Laterality: Right;    COLONOSCOPY  07/2023    COLONOSCOPY N/A 06/27/2023    Procedure: COLONOSCOPY with terminal ileum biopsy's and cold snare polypectomy x 10;  Surgeon: Neymar Watson MD;  Location: [facility] ENDOSCOPY;  Service: Gastroenterology;  Laterality: N/A;  diverticulosis, internal hemorrhoids    HERNIA REPAIR      TOTAL HIP ARTHROPLASTY Left      Family History   Problem Relation Age of Onset    Parkinsonism Mother     Heart disease Father     Valvular heart disease Father     Pancreatic cancer Sister 85    Dementia Sister     Dementia Sister      Immunization History   Administered Date(s) Administered    COVID-19 (MODERNA) 12YRS+ (SPIKEVAX) 12/13/2023, 09/20/2024    COVID-19 (PFIZER) BIVALENT 12+YRS 10/11/2022    COVID-19 (PFIZER) Purple Cap Monovalent 02/08/2021, 03/01/2021, 10/13/2021    Fluad Quad 65+ 10/18/2019, 09/17/2020    Fluzone High-Dose 65+YRS 10/09/2015, 10/17/2016, 10/11/2017, 09/24/2018, 09/20/2024    Fluzone High-Dose 65+yrs 10/28/2021, 10/11/2022, 11/09/2023    H1N1 Inj 11/17/2009    Pneumococcal Conjugate 13-Valent (PCV13) 10/26/2016    Pneumococcal Polysaccharide (PPSV23) 11/25/2019       Admission on 01/18/2025, Discharged on 01/19/2025   Component Date Value Ref Range Status    QT  Interval 01/18/2025 356  ms Final    QTC Interval 01/18/2025 370  ms Final    Glucose 01/18/2025 116 (H)  65 - 99 mg/dL Final    BUN 01/18/2025 9  8 - 23 mg/dL Final    Creatinine 01/18/2025 0.84  0.76 - 1.27 mg/dL Final    Sodium 01/18/2025 140  136 - 145 mmol/L Final    Potassium 01/18/2025 3.9  3.5 - 5.2 mmol/L Final    Chloride 01/18/2025 107  98 - 107 mmol/L Final    CO2 01/18/2025 21.0 (L)  22.0 - 29.0 mmol/L Final    Calcium 01/18/2025 8.8  8.6 - 10.5 mg/dL Final    Total Protein 01/18/2025 6.8  6.0 - 8.5 g/dL Final    Albumin 01/18/2025 4.0  3.5 - 5.2 g/dL Final    ALT (SGPT) 01/18/2025 23  1 - 41 U/L Final    AST (SGOT) 01/18/2025 31  1 - 40 U/L Final    Alkaline Phosphatase 01/18/2025 71  39 - 117 U/L Final    Total Bilirubin 01/18/2025 0.6  0.0 - 1.2 mg/dL Final    Globulin 01/18/2025 2.8  gm/dL Final    A/G Ratio 01/18/2025 1.4  g/dL Final    BUN/Creatinine Ratio 01/18/2025 10.7  7.0 - 25.0 Final    Anion Gap 01/18/2025 12.0  5.0 - 15.0 mmol/L Final    eGFR 01/18/2025 89.8  >60.0 mL/min/1.73 Final    HS Troponin T 01/18/2025 7  <22 ng/L Final    Extra Tube 01/18/2025 Hold for add-ons.   Final    Auto resulted.    Extra Tube 01/18/2025 hold for add-on   Final    Auto resulted    Extra Tube 01/18/2025 Hold for add-ons.   Final    Auto resulted.    Extra Tube 01/18/2025 Hold for add-ons.   Final    Auto resulted    WBC 01/18/2025 5.65  3.40 - 10.80 10*3/mm3 Final    RBC 01/18/2025 5.20  4.14 - 5.80 10*6/mm3 Final    Hemoglobin 01/18/2025 15.2  13.0 - 17.7 g/dL Final    Hematocrit 01/18/2025 45.8  37.5 - 51.0 % Final    MCV 01/18/2025 88.1  79.0 - 97.0 fL Final    MCH 01/18/2025 29.2  26.6 - 33.0 pg Final    MCHC 01/18/2025 33.2  31.5 - 35.7 g/dL Final    RDW 01/18/2025 13.8  12.3 - 15.4 % Final    RDW-SD 01/18/2025 44.8  37.0 - 54.0 fl Final    MPV 01/18/2025 10.9  6.0 - 12.0 fL Final    Platelets 01/18/2025 167  140 - 450 10*3/mm3 Final    Neutrophil % 01/18/2025 64.8  42.7 - 76.0 % Final    Lymphocyte %  01/18/2025 18.2 (L)  19.6 - 45.3 % Final    Monocyte % 01/18/2025 11.7  5.0 - 12.0 % Final    Eosinophil % 01/18/2025 4.4  0.3 - 6.2 % Final    Basophil % 01/18/2025 0.7  0.0 - 1.5 % Final    Immature Grans % 01/18/2025 0.2  0.0 - 0.5 % Final    Neutrophils, Absolute 01/18/2025 3.66  1.70 - 7.00 10*3/mm3 Final    Lymphocytes, Absolute 01/18/2025 1.03  0.70 - 3.10 10*3/mm3 Final    Monocytes, Absolute 01/18/2025 0.66  0.10 - 0.90 10*3/mm3 Final    Eosinophils, Absolute 01/18/2025 0.25  0.00 - 0.40 10*3/mm3 Final    Basophils, Absolute 01/18/2025 0.04  0.00 - 0.20 10*3/mm3 Final    Immature Grans, Absolute 01/18/2025 0.01  0.00 - 0.05 10*3/mm3 Final    nRBC 01/18/2025 0.0  0.0 - 0.2 /100 WBC Final    HS Troponin T 01/18/2025 7  <22 ng/L Final    Specimen hemolyzed.  Results may be falsely decreased.    Troponin T Numeric Delta 01/18/2025 0  Abnormal if >/=3 ng/L Final    Glucose 01/19/2025 92  65 - 99 mg/dL Final    BUN 01/19/2025 11  8 - 23 mg/dL Final    Creatinine 01/19/2025 0.86  0.76 - 1.27 mg/dL Final    Sodium 01/19/2025 140  136 - 145 mmol/L Final    Potassium 01/19/2025 3.8  3.5 - 5.2 mmol/L Final    Chloride 01/19/2025 108 (H)  98 - 107 mmol/L Final    CO2 01/19/2025 22.0  22.0 - 29.0 mmol/L Final    Calcium 01/19/2025 8.4 (L)  8.6 - 10.5 mg/dL Final    BUN/Creatinine Ratio 01/19/2025 12.8  7.0 - 25.0 Final    Anion Gap 01/19/2025 10.0  5.0 - 15.0 mmol/L Final    eGFR 01/19/2025 89.2  >60.0 mL/min/1.73 Final    WBC 01/19/2025 6.53  3.40 - 10.80 10*3/mm3 Final    RBC 01/19/2025 4.96  4.14 - 5.80 10*6/mm3 Final    Hemoglobin 01/19/2025 14.6  13.0 - 17.7 g/dL Final    Hematocrit 01/19/2025 43.9  37.5 - 51.0 % Final    MCV 01/19/2025 88.5  79.0 - 97.0 fL Final    MCH 01/19/2025 29.4  26.6 - 33.0 pg Final    MCHC 01/19/2025 33.3  31.5 - 35.7 g/dL Final    RDW 01/19/2025 14.0  12.3 - 15.4 % Final    RDW-SD 01/19/2025 45.2  37.0 - 54.0 fl Final    MPV 01/19/2025 11.0  6.0 - 12.0 fL Final    Platelets 01/19/2025 158   140 - 450 10*3/mm3 Final    Neutrophil % 01/19/2025 57.6  42.7 - 76.0 % Final    Lymphocyte % 01/19/2025 20.5  19.6 - 45.3 % Final    Monocyte % 01/19/2025 15.2 (H)  5.0 - 12.0 % Final    Eosinophil % 01/19/2025 5.5  0.3 - 6.2 % Final    Basophil % 01/19/2025 0.9  0.0 - 1.5 % Final    Immature Grans % 01/19/2025 0.3  0.0 - 0.5 % Final    Neutrophils, Absolute 01/19/2025 3.76  1.70 - 7.00 10*3/mm3 Final    Lymphocytes, Absolute 01/19/2025 1.34  0.70 - 3.10 10*3/mm3 Final    Monocytes, Absolute 01/19/2025 0.99 (H)  0.10 - 0.90 10*3/mm3 Final    Eosinophils, Absolute 01/19/2025 0.36  0.00 - 0.40 10*3/mm3 Final    Basophils, Absolute 01/19/2025 0.06  0.00 - 0.20 10*3/mm3 Final    Immature Grans, Absolute 01/19/2025 0.02  0.00 - 0.05 10*3/mm3 Final    nRBC 01/19/2025 0.0  0.0 - 0.2 /100 WBC Final         Review of Systems   Constitutional: Negative.    HENT: Negative.     Respiratory: Negative.     Cardiovascular:  Positive for chest pain.   Gastrointestinal: Negative.    Genitourinary: Negative.    Musculoskeletal: Negative.    Skin: Negative.    Neurological: Negative.    Psychiatric/Behavioral: Negative.         Objective   Physical Exam  Constitutional:       Appearance: Normal appearance.   HENT:      Head: Normocephalic.   Cardiovascular:      Rate and Rhythm: Normal rate and regular rhythm.      Pulses: Normal pulses.      Heart sounds: Normal heart sounds.      Comments: Patient having heart spasms at night  Pulmonary:      Effort: Pulmonary effort is normal.      Breath sounds: Normal breath sounds.   Abdominal:      General: Bowel sounds are normal.   Musculoskeletal:         General: Normal range of motion.   Skin:     General: Skin is warm.   Neurological:      General: No focal deficit present.      Mental Status: He is alert and oriented to person, place, and time.   Psychiatric:         Mood and Affect: Mood normal.         Behavior: Behavior normal.         Procedures    Assessment & Plan   Diagnoses and  all orders for this visit:    1. Unstable angina (Primary)    2. Coronary artery disease involving native coronary artery of native heart with angina pectoris with documented spasm    3. Obesity (BMI 30-39.9)    4. 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 Indications: Stable Angina Pectoris, Disp: , Rfl:     clopidogrel (PLAVIX) 75 MG tablet, Take 1 tablet by mouth once daily, Disp: 90 tablet, Rfl: 3    diphenhydrAMINE-acetaminophen (TYLENOL PM)  MG tablet per tablet, Take 2 tablets by mouth At Night As Needed., Disp: , Rfl:     ezetimibe (ZETIA) 10 MG tablet, Take 1 tablet by mouth Daily., Disp: 90 tablet, Rfl: 3    famotidine (PEPCID) 20 MG tablet, Take 1 tablet by mouth 2 (Two) Times a Day. (Patient taking differently: Take 1 tablet by mouth every night at bedtime.), Disp: , Rfl:     fluticasone (FLONASE) 50 MCG/ACT nasal spray, Administer 2 sprays into the nostril(s) as directed by provider Daily As Needed for Rhinitis., Disp: , Rfl:     isosorbide mononitrate (IMDUR) 60 MG 24 hr tablet, Take 1 tablet by mouth 2 (Two) Times a Day., Disp: 90 tablet, Rfl: 3    lisinopril (PRINIVIL,ZESTRIL) 5 MG tablet, Take 1 tablet by mouth once daily, Disp: 90 tablet, Rfl: 3    metoprolol succinate XL (TOPROL-XL) 25 MG 24 hr tablet, Take 0.5 tablets by mouth Daily., Disp: 90 tablet, Rfl: 3    nitroglycerin (NITROSTAT) 0.4 MG SL tablet, 1 under the tongue as needed for angina, may repeat q5mins for up three doses, Disp: 100 tablet, Rfl: 0    rosuvastatin (CRESTOR) 40 MG tablet, Take 1 tablet by mouth Every Night., Disp: 90 tablet, Rfl: 3           Rama Quintanilla, APRN 1/27/2025 12:10 EST  This note has been electronically signed

## 2025-01-27 NOTE — PATIENT INSTRUCTIONS
Keep appointment with cardiology  Use nitroglycerin for heart spasms as needed  If you use more than 1 nitroglycerin monitor blood pressure  Keep appointment in April with oncology

## 2025-02-01 NOTE — PROGRESS NOTES
Encounter Date:02/04/2025        Patient ID: Donald Navarro is a 79 y.o. male.      Chief Complaint:      History of Present Illness  Donald Navarro is a 79 y.o. male with history of CAD status post PCI to the LAD, hypertension, hyperlipidemia.   January 2025 presentation to the hospital with unstable angina where cardiac catheterization showed patent stent in the LAD.  Nonobstructive disease in the other vessels.  He gets coronary spasms which wake him from sleep.  He has had 2 more episodes since last cardiac catheterization.  Symptoms resolved after taking nitroglycerin.     Current cardiac medications include aspirin, amlodipine, Plavix, Zetia, lisinopril, Imdur, Toprol-XL and Crestor.    The following portions of the patient's history were reviewed and updated as appropriate: allergies, current medications, past family history, past medical history, past social history, past surgical history, and problem list.    Review of Systems   Constitutional: Negative for malaise/fatigue.   Cardiovascular:  Positive for chest pain. Negative for dyspnea on exertion, leg swelling and palpitations.   Respiratory:  Positive for shortness of breath. Negative for cough.    Gastrointestinal:  Negative for abdominal pain, nausea and vomiting.   Neurological:  Negative for dizziness, focal weakness, headaches, light-headedness and numbness.   All other systems reviewed and are negative.        Current Outpatient Medications:     amLODIPine (NORVASC) 5 MG tablet, Take 1 tablet by mouth Daily., Disp: 90 tablet, Rfl: 3    aspirin 81 MG EC tablet, Take 1 tablet by mouth Daily. Indications: Stable Angina Pectoris, Disp: , Rfl:     clopidogrel (PLAVIX) 75 MG tablet, Take 1 tablet by mouth once daily, Disp: 90 tablet, Rfl: 3    diphenhydrAMINE-acetaminophen (TYLENOL PM)  MG tablet per tablet, Take 2 tablets by mouth At Night As Needed., Disp: , Rfl:     ezetimibe (ZETIA) 10 MG tablet, Take 1 tablet by mouth Daily., Disp: 90 tablet,  Rfl: 3    famotidine (PEPCID) 20 MG tablet, Take 1 tablet by mouth 2 (Two) Times a Day. (Patient taking differently: Take 2 tablets by mouth every night at bedtime.), Disp: , Rfl:     fluticasone (FLONASE) 50 MCG/ACT nasal spray, Administer 2 sprays into the nostril(s) as directed by provider Daily As Needed for Rhinitis., Disp: , Rfl:     isosorbide mononitrate (IMDUR) 60 MG 24 hr tablet, Take 1 tablet by mouth 2 (Two) Times a Day., Disp: 90 tablet, Rfl: 3    lisinopril (PRINIVIL,ZESTRIL) 5 MG tablet, Take 1 tablet by mouth once daily, Disp: 90 tablet, Rfl: 3    metoprolol succinate XL (TOPROL-XL) 25 MG 24 hr tablet, Take 0.5 tablets by mouth Daily., Disp: 90 tablet, Rfl: 3    nitroglycerin (NITROSTAT) 0.4 MG SL tablet, 1 under the tongue as needed for angina, may repeat q5mins for up three doses, Disp: 100 tablet, Rfl: 0    rosuvastatin (CRESTOR) 40 MG tablet, Take 1 tablet by mouth Every Night., Disp: 90 tablet, Rfl: 3    Current outpatient and discharge medications have been reconciled for the patient.  Reviewed by: Maximino Lynn MD       Allergies   Allergen Reactions    Adhesive Tape Itching       Family History   Problem Relation Age of Onset    Parkinsonism Mother     Heart disease Father     Valvular heart disease Father     Pancreatic cancer Sister 85    Dementia Sister     Dementia Sister        Past Surgical History:   Procedure Laterality Date    CARDIAC CATHETERIZATION  01/2007    TIA: proximal LAD    CARDIAC CATHETERIZATION N/A 07/09/2022    Procedure: Left Heart Cath possible PCI, atherectomy, hemodynamic support;  Surgeon: John Perez MD;  Location: [facility] CATH INVASIVE LOCATION;  Service: Cardiovascular;  Laterality: N/A;    CARDIAC CATHETERIZATION Right 1/19/2025    Procedure: Left Heart Cath;  Surgeon: Maximino Lynn MD;  Location: [facility] CATH INVASIVE LOCATION;  Service: Cardiovascular;  Laterality: Right;    COLONOSCOPY  07/2023    COLONOSCOPY N/A 06/27/2023    Procedure: COLONOSCOPY with  "terminal ileum biopsy's and cold snare polypectomy x 10;  Surgeon: Neymar Watson MD;  Location: [facility] ENDOSCOPY;  Service: Gastroenterology;  Laterality: N/A;  diverticulosis, internal hemorrhoids    HERNIA REPAIR      TOTAL HIP ARTHROPLASTY Left        Past Medical History:   Diagnosis Date    CHF (congestive heart failure)     Coronary artery disease     GERD (gastroesophageal reflux disease)     Hyperlipidemia     Hypertension     Myocardial infarction        Family History   Problem Relation Age of Onset    Parkinsonism Mother     Heart disease Father     Valvular heart disease Father     Pancreatic cancer Sister 85    Dementia Sister     Dementia Sister        Social History     Socioeconomic History    Marital status:    Tobacco Use    Smoking status: Never     Passive exposure: Never    Smokeless tobacco: Never   Vaping Use    Vaping status: Never Used   Substance and Sexual Activity    Alcohol use: No    Drug use: No    Sexual activity: Defer               Objective:       Physical Exam    /74 (BP Location: Left arm, Patient Position: Sitting, Cuff Size: Adult)   Pulse 62   Ht 182.9 cm (72\")   Wt 110 kg (242 lb)   SpO2 94%   BMI 32.82 kg/m²   The patient is alert, oriented and in no distress.    Vital signs as noted above.    Head and neck revealed no carotid bruits or jugular venous distension.  No thyromegaly or lymphadenopathy is present.    Lungs clear.  No wheezing.  Breath sounds are normal bilaterally.    Heart normal first and second heart sounds.  No murmur..  No pericardial rub is present.  No gallop is present.    Abdomen soft and nontender.  No organomegaly is present.    Extremities revealed good peripheral pulses without any pedal edema.    Skin warm and dry.    Musculoskeletal system is grossly normal.    CNS grossly normal.           Diagnosis Plan   1. Unstable angina        2. Coronary artery disease involving native coronary artery of native heart with angina pectoris " with documented spasm        3. Hyperlipidemia, unspecified hyperlipidemia type        4. Primary hypertension        5. Obesity (BMI 30-39.9)        6. Coronary artery disease involving native coronary artery of native heart without angina pectoris [I25.10]        7. Mixed hyperlipidemia        8. Lymphoma, unspecified body region, unspecified lymphoma type        9. Dyspnea on exertion        10. Gastroesophageal reflux disease with hiatal hernia        LAB RESULTS (LAST 7 DAYS)    CBC        BMP        CMP         BNP        TROPONIN        CoAg        Creatinine Clearance  Estimated Creatinine Clearance: 92.2 mL/min (by C-G formula based on SCr of 0.86 mg/dL).    ABG        Radiology  No radiology results for the last day    EKG  Procedures    Stress test  Results for orders placed during the hospital encounter of 03/04/20    Stress Test With Myocardial Perfusion One Day    Interpretation Summary  · Findings consistent with a normal ECG stress test.  · Left ventricular ejection fraction is normal (Calculated EF = 59%).  · Impressions are consistent with a low risk study.  · Small fixed distal inferolateral defect without any ischemia EF 60%.    No changes in EKG with lexiscan.  Correlation with myocardial perfusion images recommended      Echocardiogram      Cardiac catheterization  Results for orders placed during the hospital encounter of 01/18/25    Cardiac Catheterization/Vascular Study    Conclusion  OPERATORS:  1. Maximino Lynn M.D., Attending Cardiologist      PROCEDURE PERFORMED.  Ultrasound guided vascular access  Left heart catheterization  Coronary Angiogram  Moderate Sedation    INDICATIONS FOR PROCEDURE.  79 years old man with multiple cardiovascular risk factors presented to the hospital with chest pain that woke him up from sleep.  Signs and symptoms were thought to be consistent with unstable angina.  After discussing the risk and benefits of the procedure he was brought to the cardiac Cath Lab  for definitive coronary angiography.    PROCEDURE IN DETAIL.  Informed consent was obtained from the patient after explaining the risks, benefits, and alternative options of the procedure. After obtaining informed consent, the patient was brought to the cath lab and was prepped in a sterile fashion. Lidocaine 1% was used for local anesthesia into the right radial access site. The right radial artery was accessed under direct ultrasound visualization  with an angiocath needle via modified Seldinger technique. A 6F slender sheath was inserted successfully. Afterwards, 6F JR4  was advanced over a wire into the ascending aorta and used to cross the AV and obtain LV pressures.  AV gradient obtained via pullback technique. JR4 and JL3.5 diagnostic catheters were used to engage the ostia of the RCA and LM respectively. Images of the right and left coronary systems were obtained. All the catheters were exchanged over a wire and subsequently removed. The patient tolerated the procedure well without any complications. The pictures were reviewed at the end of the procedure. TR band applied to right wrist for hemostasis and inflated with 15 cc of air. No complications were encountered.    HEMODYNAMICS.  LV: 91/6, 9 mmHg  AO: 91/64, 76 mmHg  No significant gradient across the aortic valve during pullback of JR4 catheter.  LV gram was not performed due to recent echocardiogram.    FINDINGS.    Coronary Angiogram.    1. Left main. Left main is a large-caliber vessel which gives rise to the Left Anterior Descending and the Left circumflex.  Left main is angiographically free from any significant disease    2. Left Anterior Descending Artery. LAD is a large vessel which gives rise to several septal perforators and several diagonal branches.  LAD has a patent stent in mid segment.  Jailed diagonal has 30% ostial stenosis.    3. Left Circumflex. The LCx is a medium caliber which gives rise to marginals.  Mid left circumflex has hazy  30% stenosis.    4. Right Coronary Artery. The RCA is a dominant vessel which gives rise to several small caliber branches including PDA and PLV.  RCA has diffuse luminal irregularities.  Sluggish flow throughout the vessel.    IMPRESSIONS.  1. Non-obstructive CAD with patent LAD stent  2.  Normal LVEDP    RECOMMENDATIONS.  1.  Uptitrate antianginal medications for microvascular disease.  2.  Evaluate for noncardiac/GI causes of chest pain  3.  Continue risk factors modification    Electronically signed by Maximino Lynn MD, 01/19/25, 10:30 AM EST.          Assessment and Plan       Diagnoses and all orders for this visit:    1. Unstable angina (Primary)    2. Coronary artery disease involving native coronary artery of native heart with angina pectoris with documented spasm    3. Hyperlipidemia, unspecified hyperlipidemia type    4. Primary hypertension    5. Obesity (BMI 30-39.9)    6. Coronary artery disease involving native coronary artery of native heart without angina pectoris [I25.10]    7. Mixed hyperlipidemia    8. Lymphoma, unspecified body region, unspecified lymphoma type    9. Dyspnea on exertion    10. Gastroesophageal reflux disease with hiatal hernia         Chest pain, angina  Coronary artery disease  Previous PCI to the LAD with slow flow in the LAD  ECG with normal acute ischemia.  High-sensitivity troponin negative x 2  Continue amlodipine, Imdur and metoprolol  Continue dual antiplatelet therapy and high intensity statin  Repeat cardiac catheterization in January 2025 shows patent stent and normal LVEDP  PPI for possible GERD symptoms.  He believes he gets spasms of coronary arteries.  Reassurance provided to the patient.     Hypertension  Heart rate and blood pressure well-controlled     Hyperlipidemia  Continue Crestor  LDL 55, HDL 30, triglyceride 139 and total cholesterol 110     Lymphoma  In remission     Dyspnea on exertion  Echocardiogram shows preserved LV function, grade 1 diastolic  dysfunction and no significant valvular abnormalities.  RVSP 38 mmHg.  LVEDP during cardiac catheterization is normal     Obesity  BMI is 32.8.  He weighs 242 pounds.  Lifestyle modifications recommended.  Screening and treatment for sleep apnea recommended.

## 2025-02-04 ENCOUNTER — OFFICE VISIT (OUTPATIENT)
Dept: CARDIOLOGY | Facility: CLINIC | Age: 80
End: 2025-02-04
Payer: MEDICARE

## 2025-02-04 VITALS
SYSTOLIC BLOOD PRESSURE: 122 MMHG | HEIGHT: 72 IN | OXYGEN SATURATION: 94 % | HEART RATE: 62 BPM | DIASTOLIC BLOOD PRESSURE: 74 MMHG | BODY MASS INDEX: 32.78 KG/M2 | WEIGHT: 242 LBS

## 2025-02-04 DIAGNOSIS — E78.2 MIXED HYPERLIPIDEMIA: ICD-10-CM

## 2025-02-04 DIAGNOSIS — I25.10 CORONARY ARTERY DISEASE INVOLVING NATIVE CORONARY ARTERY OF NATIVE HEART WITHOUT ANGINA PECTORIS: ICD-10-CM

## 2025-02-04 DIAGNOSIS — I20.0 UNSTABLE ANGINA: Primary | ICD-10-CM

## 2025-02-04 DIAGNOSIS — K44.9 GASTROESOPHAGEAL REFLUX DISEASE WITH HIATAL HERNIA: Chronic | ICD-10-CM

## 2025-02-04 DIAGNOSIS — I25.111 CORONARY ARTERY DISEASE INVOLVING NATIVE CORONARY ARTERY OF NATIVE HEART WITH ANGINA PECTORIS WITH DOCUMENTED SPASM: ICD-10-CM

## 2025-02-04 DIAGNOSIS — E78.5 HYPERLIPIDEMIA, UNSPECIFIED HYPERLIPIDEMIA TYPE: ICD-10-CM

## 2025-02-04 DIAGNOSIS — E66.9 OBESITY (BMI 30-39.9): ICD-10-CM

## 2025-02-04 DIAGNOSIS — K21.9 GASTROESOPHAGEAL REFLUX DISEASE WITH HIATAL HERNIA: Chronic | ICD-10-CM

## 2025-02-04 DIAGNOSIS — C85.90 LYMPHOMA, UNSPECIFIED BODY REGION, UNSPECIFIED LYMPHOMA TYPE: ICD-10-CM

## 2025-02-04 DIAGNOSIS — I10 PRIMARY HYPERTENSION: ICD-10-CM

## 2025-02-04 DIAGNOSIS — R06.09 DYSPNEA ON EXERTION: ICD-10-CM

## 2025-02-04 PROCEDURE — 1159F MED LIST DOCD IN RCRD: CPT | Performed by: INTERNAL MEDICINE

## 2025-02-04 PROCEDURE — 99214 OFFICE O/P EST MOD 30 MIN: CPT | Performed by: INTERNAL MEDICINE

## 2025-02-04 PROCEDURE — 3074F SYST BP LT 130 MM HG: CPT | Performed by: INTERNAL MEDICINE

## 2025-02-04 PROCEDURE — 1160F RVW MEDS BY RX/DR IN RCRD: CPT | Performed by: INTERNAL MEDICINE

## 2025-02-04 PROCEDURE — 3078F DIAST BP <80 MM HG: CPT | Performed by: INTERNAL MEDICINE

## 2025-03-24 DIAGNOSIS — E78.5 HYPERLIPIDEMIA, UNSPECIFIED HYPERLIPIDEMIA TYPE: Primary | ICD-10-CM

## 2025-03-24 RX ORDER — EZETIMIBE 10 MG/1
10 TABLET ORAL DAILY
Qty: 90 TABLET | Refills: 3 | Status: SHIPPED | OUTPATIENT
Start: 2025-03-24

## 2025-03-24 NOTE — TELEPHONE ENCOUNTER
Rx Refill Note  Requested Prescriptions     Signed Prescriptions Disp Refills    ezetimibe (ZETIA) 10 MG tablet 90 tablet 3     Sig: Take 1 tablet by mouth Daily.     Authorizing Provider: YVES SAAVEDRA     Ordering User: EVELYN YOON      Last office visit with prescribing clinician: 2/4/2025   Last telemedicine visit with prescribing clinician: Visit date not found   Next office visit with prescribing clinician: Visit date not found                         Would you like a call back once the refill request has been completed: [] Yes [] No    If the office needs to give you a call back, can they leave a voicemail: [] Yes [] No    Evelyn Yoon MA  03/24/25, 12:03 EDT

## 2025-04-16 ENCOUNTER — APPOINTMENT (OUTPATIENT)
Dept: GENERAL RADIOLOGY | Facility: HOSPITAL | Age: 80
End: 2025-04-16
Payer: MEDICARE

## 2025-04-16 ENCOUNTER — HOSPITAL ENCOUNTER (OUTPATIENT)
Facility: HOSPITAL | Age: 80
Discharge: HOME | End: 2025-04-16
Attending: EMERGENCY MEDICINE | Admitting: EMERGENCY MEDICINE
Payer: MEDICARE

## 2025-04-16 ENCOUNTER — ON CAMPUS - OUTPATIENT (OUTPATIENT)
Dept: URBAN - METROPOLITAN AREA HOSPITAL 85 | Facility: HOSPITAL | Age: 80
End: 2025-04-16
Payer: MEDICARE

## 2025-04-16 VITALS
TEMPERATURE: 97.6 F | OXYGEN SATURATION: 95 % | BODY MASS INDEX: 32.61 KG/M2 | HEART RATE: 50 BPM | SYSTOLIC BLOOD PRESSURE: 154 MMHG | RESPIRATION RATE: 15 BRPM | DIASTOLIC BLOOD PRESSURE: 88 MMHG | WEIGHT: 240.74 LBS | HEIGHT: 72 IN

## 2025-04-16 DIAGNOSIS — R07.9 CHEST PAIN, UNSPECIFIED TYPE: Primary | ICD-10-CM

## 2025-04-16 DIAGNOSIS — K21.9 GASTRO-ESOPHAGEAL REFLUX DISEASE WITHOUT ESOPHAGITIS: ICD-10-CM

## 2025-04-16 DIAGNOSIS — R07.9 CHEST PAIN, UNSPECIFIED: ICD-10-CM

## 2025-04-16 DIAGNOSIS — K44.9 DIAPHRAGMATIC HERNIA WITHOUT OBSTRUCTION OR GANGRENE: ICD-10-CM

## 2025-04-16 PROBLEM — I20.0 UNSTABLE ANGINA: Status: RESOLVED | Noted: 2025-01-13 | Resolved: 2025-04-16

## 2025-04-16 PROBLEM — C83.08 SMALL B-CELL LYMPHOMA OF LYMPH NODES OF MULTIPLE REGIONS: Chronic | Status: ACTIVE | Noted: 2025-01-27

## 2025-04-16 LAB
ALBUMIN SERPL-MCNC: 4.1 G/DL (ref 3.5–5.2)
ALBUMIN/GLOB SERPL: 1.5 G/DL
ALP SERPL-CCNC: 72 U/L (ref 39–117)
ALT SERPL W P-5'-P-CCNC: 22 U/L (ref 1–41)
ANION GAP SERPL CALCULATED.3IONS-SCNC: 12.6 MMOL/L (ref 5–15)
AST SERPL-CCNC: 30 U/L (ref 1–40)
BASOPHILS # BLD AUTO: 0.07 10*3/MM3 (ref 0–0.2)
BASOPHILS NFR BLD AUTO: 0.8 % (ref 0–1.5)
BILIRUB SERPL-MCNC: 0.9 MG/DL (ref 0–1.2)
BUN SERPL-MCNC: 15 MG/DL (ref 8–23)
BUN/CREAT SERPL: 15 (ref 7–25)
CALCIUM SPEC-SCNC: 9 MG/DL (ref 8.6–10.5)
CHLORIDE SERPL-SCNC: 106 MMOL/L (ref 98–107)
CO2 SERPL-SCNC: 21.4 MMOL/L (ref 22–29)
CREAT SERPL-MCNC: 1 MG/DL (ref 0.76–1.27)
DEPRECATED RDW RBC AUTO: 43 FL (ref 37–54)
EGFRCR SERPLBLD CKD-EPI 2021: 77.5 ML/MIN/1.73
EOSINOPHIL # BLD AUTO: 0.22 10*3/MM3 (ref 0–0.4)
EOSINOPHIL NFR BLD AUTO: 2.6 % (ref 0.3–6.2)
ERYTHROCYTE [DISTWIDTH] IN BLOOD BY AUTOMATED COUNT: 13.2 % (ref 12.3–15.4)
GEN 5 1HR TROPONIN T REFLEX: <6 NG/L
GLOBULIN UR ELPH-MCNC: 2.8 GM/DL
GLUCOSE SERPL-MCNC: 121 MG/DL (ref 65–99)
HCT VFR BLD AUTO: 48.6 % (ref 37.5–51)
HGB BLD-MCNC: 15.9 G/DL (ref 13–17.7)
HOLD SPECIMEN: NORMAL
HOLD SPECIMEN: NORMAL
IMM GRANULOCYTES # BLD AUTO: 0.02 10*3/MM3 (ref 0–0.05)
IMM GRANULOCYTES NFR BLD AUTO: 0.2 % (ref 0–0.5)
LYMPHOCYTES # BLD AUTO: 1.34 10*3/MM3 (ref 0.7–3.1)
LYMPHOCYTES NFR BLD AUTO: 16.1 % (ref 19.6–45.3)
MCH RBC QN AUTO: 28.9 PG (ref 26.6–33)
MCHC RBC AUTO-ENTMCNC: 32.7 G/DL (ref 31.5–35.7)
MCV RBC AUTO: 88.4 FL (ref 79–97)
MONOCYTES # BLD AUTO: 0.84 10*3/MM3 (ref 0.1–0.9)
MONOCYTES NFR BLD AUTO: 10.1 % (ref 5–12)
NEUTROPHILS NFR BLD AUTO: 5.82 10*3/MM3 (ref 1.7–7)
NEUTROPHILS NFR BLD AUTO: 70.2 % (ref 42.7–76)
NRBC BLD AUTO-RTO: 0 /100 WBC (ref 0–0.2)
PLATELET # BLD AUTO: 162 10*3/MM3 (ref 140–450)
PMV BLD AUTO: 11.2 FL (ref 6–12)
POTASSIUM SERPL-SCNC: 4 MMOL/L (ref 3.5–5.2)
PROT SERPL-MCNC: 6.9 G/DL (ref 6–8.5)
RBC # BLD AUTO: 5.5 10*6/MM3 (ref 4.14–5.8)
SODIUM SERPL-SCNC: 140 MMOL/L (ref 136–145)
TROPONIN T NUMERIC DELTA: NORMAL
TROPONIN T SERPL HS-MCNC: 7 NG/L
WBC NRBC COR # BLD AUTO: 8.31 10*3/MM3 (ref 3.4–10.8)
WHOLE BLOOD HOLD COAG: NORMAL
WHOLE BLOOD HOLD SPECIMEN: NORMAL

## 2025-04-16 PROCEDURE — 99223 1ST HOSP IP/OBS HIGH 75: CPT

## 2025-04-16 PROCEDURE — 80053 COMPREHEN METABOLIC PANEL: CPT | Performed by: EMERGENCY MEDICINE

## 2025-04-16 PROCEDURE — G0378 HOSPITAL OBSERVATION PER HR: HCPCS

## 2025-04-16 PROCEDURE — A9270 NON-COVERED ITEM OR SERVICE: HCPCS

## 2025-04-16 PROCEDURE — 63710000001 PANTOPRAZOLE 40 MG TABLET DELAYED-RELEASE

## 2025-04-16 PROCEDURE — 63710000001 ACETAMINOPHEN 325 MG TABLET: Performed by: NURSE PRACTITIONER

## 2025-04-16 PROCEDURE — 85025 COMPLETE CBC W/AUTO DIFF WBC: CPT | Performed by: EMERGENCY MEDICINE

## 2025-04-16 PROCEDURE — A9270 NON-COVERED ITEM OR SERVICE: HCPCS | Performed by: NURSE PRACTITIONER

## 2025-04-16 PROCEDURE — 36415 COLL VENOUS BLD VENIPUNCTURE: CPT

## 2025-04-16 PROCEDURE — 99214 OFFICE O/P EST MOD 30 MIN: CPT | Performed by: INTERNAL MEDICINE

## 2025-04-16 PROCEDURE — 93005 ELECTROCARDIOGRAM TRACING: CPT | Performed by: EMERGENCY MEDICINE

## 2025-04-16 PROCEDURE — 84484 ASSAY OF TROPONIN QUANT: CPT | Performed by: EMERGENCY MEDICINE

## 2025-04-16 PROCEDURE — 99285 EMERGENCY DEPT VISIT HI MDM: CPT

## 2025-04-16 PROCEDURE — 71045 X-RAY EXAM CHEST 1 VIEW: CPT

## 2025-04-16 PROCEDURE — 93005 ELECTROCARDIOGRAM TRACING: CPT

## 2025-04-16 RX ORDER — BISACODYL 5 MG/1
5 TABLET, DELAYED RELEASE ORAL DAILY PRN
Status: DISCONTINUED | OUTPATIENT
Start: 2025-04-16 | End: 2025-04-16 | Stop reason: HOSPADM

## 2025-04-16 RX ORDER — AMLODIPINE BESYLATE 5 MG/1
5 TABLET ORAL NIGHTLY
Status: DISCONTINUED | OUTPATIENT
Start: 2025-04-16 | End: 2025-04-16 | Stop reason: HOSPADM

## 2025-04-16 RX ORDER — SODIUM CHLORIDE 0.9 % (FLUSH) 0.9 %
10 SYRINGE (ML) INJECTION EVERY 12 HOURS SCHEDULED
Status: DISCONTINUED | OUTPATIENT
Start: 2025-04-16 | End: 2025-04-16 | Stop reason: HOSPADM

## 2025-04-16 RX ORDER — SODIUM CHLORIDE 0.9 % (FLUSH) 0.9 %
10 SYRINGE (ML) INJECTION AS NEEDED
Status: DISCONTINUED | OUTPATIENT
Start: 2025-04-16 | End: 2025-04-16 | Stop reason: HOSPADM

## 2025-04-16 RX ORDER — FAMOTIDINE 20 MG/1
40 TABLET, FILM COATED ORAL 2 TIMES DAILY
Status: DISCONTINUED | OUTPATIENT
Start: 2025-04-16 | End: 2025-04-16

## 2025-04-16 RX ORDER — FAMOTIDINE 40 MG/1
40 TABLET, FILM COATED ORAL 2 TIMES DAILY
COMMUNITY
End: 2025-04-16 | Stop reason: HOSPADM

## 2025-04-16 RX ORDER — NITROGLYCERIN 0.4 MG/1
0.4 TABLET SUBLINGUAL
Status: DISCONTINUED | OUTPATIENT
Start: 2025-04-16 | End: 2025-04-16 | Stop reason: HOSPADM

## 2025-04-16 RX ORDER — ISOSORBIDE MONONITRATE 60 MG/1
60 TABLET, EXTENDED RELEASE ORAL 2 TIMES DAILY
Status: DISCONTINUED | OUTPATIENT
Start: 2025-04-16 | End: 2025-04-16 | Stop reason: HOSPADM

## 2025-04-16 RX ORDER — BISACODYL 10 MG
10 SUPPOSITORY, RECTAL RECTAL DAILY PRN
Status: DISCONTINUED | OUTPATIENT
Start: 2025-04-16 | End: 2025-04-16 | Stop reason: HOSPADM

## 2025-04-16 RX ORDER — ROSUVASTATIN CALCIUM 10 MG/1
40 TABLET, COATED ORAL NIGHTLY
Status: DISCONTINUED | OUTPATIENT
Start: 2025-04-16 | End: 2025-04-16 | Stop reason: HOSPADM

## 2025-04-16 RX ORDER — METOPROLOL SUCCINATE 25 MG/1
12.5 TABLET, EXTENDED RELEASE ORAL DAILY
Status: DISCONTINUED | OUTPATIENT
Start: 2025-04-17 | End: 2025-04-16 | Stop reason: HOSPADM

## 2025-04-16 RX ORDER — AMOXICILLIN 250 MG
2 CAPSULE ORAL 2 TIMES DAILY PRN
Status: DISCONTINUED | OUTPATIENT
Start: 2025-04-16 | End: 2025-04-16 | Stop reason: HOSPADM

## 2025-04-16 RX ORDER — LISINOPRIL 5 MG/1
5 TABLET ORAL DAILY
Status: DISCONTINUED | OUTPATIENT
Start: 2025-04-17 | End: 2025-04-16 | Stop reason: HOSPADM

## 2025-04-16 RX ORDER — PANTOPRAZOLE SODIUM 40 MG/1
40 TABLET, DELAYED RELEASE ORAL
Qty: 30 TABLET | Refills: 0 | Status: SHIPPED | OUTPATIENT
Start: 2025-04-17 | End: 2025-05-17

## 2025-04-16 RX ORDER — ACETAMINOPHEN 325 MG/1
650 TABLET ORAL EVERY 4 HOURS PRN
Status: DISCONTINUED | OUTPATIENT
Start: 2025-04-16 | End: 2025-04-16 | Stop reason: HOSPADM

## 2025-04-16 RX ORDER — SODIUM CHLORIDE 9 MG/ML
40 INJECTION, SOLUTION INTRAVENOUS AS NEEDED
Status: DISCONTINUED | OUTPATIENT
Start: 2025-04-16 | End: 2025-04-16 | Stop reason: HOSPADM

## 2025-04-16 RX ORDER — PANTOPRAZOLE SODIUM 40 MG/1
40 TABLET, DELAYED RELEASE ORAL
Status: DISCONTINUED | OUTPATIENT
Start: 2025-04-16 | End: 2025-04-16 | Stop reason: HOSPADM

## 2025-04-16 RX ORDER — ASPIRIN 81 MG/1
324 TABLET, CHEWABLE ORAL ONCE
Status: COMPLETED | OUTPATIENT
Start: 2025-04-16 | End: 2025-04-16

## 2025-04-16 RX ORDER — POLYETHYLENE GLYCOL 3350 17 G/17G
17 POWDER, FOR SOLUTION ORAL DAILY PRN
Status: DISCONTINUED | OUTPATIENT
Start: 2025-04-16 | End: 2025-04-16 | Stop reason: HOSPADM

## 2025-04-16 RX ADMIN — PANTOPRAZOLE SODIUM 40 MG: 40 TABLET, DELAYED RELEASE ORAL at 16:57

## 2025-04-16 RX ADMIN — ACETAMINOPHEN 650 MG: 325 TABLET, FILM COATED ORAL at 13:43

## 2025-04-16 RX ADMIN — Medication 10 ML: at 11:43

## 2025-04-16 RX ADMIN — NITROGLYCERIN 0.4 MG: 0.4 TABLET SUBLINGUAL at 08:52

## 2025-04-16 RX ADMIN — ASPIRIN 81 MG CHEWABLE TABLET 324 MG: 81 TABLET CHEWABLE at 08:52

## 2025-04-16 NOTE — ED NOTES
Nursing report ED to floor  Donald Navarro  79 y.o.  male    HPI:   Chief Complaint   Patient presents with    Chest Pain     Chest pain radiates down left arm started this morning. SOA  Pt took 2 nitro with relief.           Admitting doctor:   No admitting provider for patient encounter.    Admitting diagnosis:   The encounter diagnosis was Chest pain, unspecified type.    Code status:   Current Code Status       Date Active Code Status Order ID Comments User Context       4/16/2025 1136 CPR (Attempt to Resuscitate) [id number]  Song Trejo MD ED        Question Answer    Code Status (Patient has no pulse and is not breathing) CPR (Attempt to Resuscitate)    Medical Interventions (Patient has pulse or is breathing) Full Support                    Allergies:   Adhesive tape    Isolation:  No active isolations     Fall Risk:  Fall Risk Assessment was completed, and patient is at low risk for falls.   Predictive Model Details         13 (Low) Factor Value    Calculated 4/16/2025 12:14 Age 79    Risk of Fall Model Active Peripheral IV Present     Imaging order in this encounter Present     Respiratory Rate 20     Magnesium not on file     Number of Distinct Medication Classes administered 3     Doimnik Scale not on file     Total Bilirubin 0.9 mg/dL     Calcium 9 mg/dL     Clinically Relevant Sex Not Female     Diastolic BP 83     Cardiac Assessment X     Albumin 4.1 g/dL     Duration of Current Encounter 0.156 days     Chloride 106 mmol/L     ALT 22 U/L     Potassium 4 mmol/L     Creatinine 1 mg/dL         Weight:       04/16/25  0827   Weight: 109 kg (240 lb 11.9 oz)       Intake and Output  No intake or output data in the 24 hours ending 04/16/25 1214    Diet:   Dietary Orders (From admission, onward)       Start     Ordered    04/16/25 1136  NPO Diet NPO Type: Strict NPO  Diet Effective Now        Question:  NPO Type  Answer:  Strict NPO    04/16/25 1136                     Most recent vitals:   Vitals:     04/16/25 1114 04/16/25 1129 04/16/25 1148 04/16/25 1158   BP: 125/78 138/84 126/80 133/83   Pulse: 56 55 57 53   Resp:       Temp:       SpO2: 94% 96% 96% 95%   Weight:       Height:           Active LDAs/IV Access:   Lines, Drains & Airways       Active LDAs       Name Placement date Placement time Site Days    Peripheral IV 04/16/25 0844 20 G Right Antecubital 04/16/25 0844  Antecubital  less than 1                    Skin Condition:   Skin Assessments (last day)       None             Labs (abnormal labs have a star):   Labs Reviewed   COMPREHENSIVE METABOLIC PANEL - Abnormal; Notable for the following components:       Result Value    Glucose 121 (*)     CO2 21.4 (*)     All other components within normal limits    Narrative:     GFR Categories in Chronic Kidney Disease (CKD)      GFR Category          GFR (mL/min/1.73)    Interpretation  G1                     90 or greater         Normal or high (1)  G2                      60-89                Mild decrease (1)  G3a                   45-59                Mild to moderate decrease  G3b                   30-44                Moderate to severe decrease  G4                    15-29                Severe decrease  G5                    14 or less           Kidney failure          (1)In the absence of evidence of kidney disease, neither GFR category G1 or G2 fulfill the criteria for CKD.    eGFR calculation 2021 CKD-EPI creatinine equation, which does not include race as a factor   CBC WITH AUTO DIFFERENTIAL - Abnormal; Notable for the following components:    Lymphocyte % 16.1 (*)     All other components within normal limits   TROPONIN - Normal    Narrative:     High Sensitive Troponin T Reference Range:  <14.0 ng/L- Negative Female for AMI  <22.0 ng/L- Negative Male for AMI  >=14 - Abnormal Female indicating possible myocardial injury.  >=22 - Abnormal Male indicating possible myocardial injury.   Clinicians would have to utilize clinical acumen, EKG,  Troponin, and serial changes to determine if it is an Acute Myocardial Infarction or myocardial injury due to an underlying chronic condition.        RAINBOW DRAW    Narrative:     The following orders were created for panel order Edgar Draw.  Procedure                               Abnormality         Status                     ---------                               -----------         ------                     Green Top (Gel)[[id number]]                                  Final result               Lavender Top[[id number]]                                     Final result               Gold Top - SST[[id number]]                                   Final result               Light Blue Top[[id number]]                                   Final result                 Please view results for these tests on the individual orders.   HIGH SENSITIVITIY TROPONIN T 1HR    Narrative:     High Sensitive Troponin T Reference Range:  <14.0 ng/L- Negative Female for AMI  <22.0 ng/L- Negative Male for AMI  >=14 - Abnormal Female indicating possible myocardial injury.  >=22 - Abnormal Male indicating possible myocardial injury.   Clinicians would have to utilize clinical acumen, EKG, Troponin, and serial changes to determine if it is an Acute Myocardial Infarction or myocardial injury due to an underlying chronic condition.        GREEN TOP   LAVENDER TOP   GOLD TOP - SST   LIGHT BLUE TOP   CBC AND DIFFERENTIAL    Narrative:     The following orders were created for panel order CBC & Differential.  Procedure                               Abnormality         Status                     ---------                               -----------         ------                     CBC Auto Differential[[id number]]        Abnormal            Final result                 Please view results for these tests on the individual orders.       LOC: Person, Place, Time, and Situation    Telemetry:  Observation Unit    Cardiac Monitoring Ordered: yes    EKG:    ECG 12 Lead Chest Pain   Preliminary Result   HEART RATE=61  bpm   RR Ucnifowh=040  ms   OK Lunaxkjk=079  ms   P Horizontal Axis=16  deg   P Front Axis=25  deg   QRSD Interval=90  ms   QT Gxnmyuvh=009  ms   EQtV=113  ms   QRS Axis=-22  deg   T Wave Axis=154  deg   - NORMAL ECG -   Sinus rhythm   Date and Time of Study:2025-04-16 08:28:17          Medications Given in the ED:   Medications   sodium chloride 0.9 % flush 10 mL (has no administration in time range)   nitroglycerin (NITROSTAT) SL tablet 0.4 mg (0.4 mg Sublingual Given 4/16/25 0852)   sodium chloride 0.9 % flush 10 mL (10 mL Intravenous Given 4/16/25 1143)   sodium chloride 0.9 % flush 10 mL (has no administration in time range)   sodium chloride 0.9 % infusion 40 mL (has no administration in time range)   sennosides-docusate (PERICOLACE) 8.6-50 MG per tablet 2 tablet (has no administration in time range)     And   polyethylene glycol (MIRALAX) packet 17 g (has no administration in time range)     And   bisacodyl (DULCOLAX) EC tablet 5 mg (has no administration in time range)     And   bisacodyl (DULCOLAX) suppository 10 mg (has no administration in time range)   aspirin chewable tablet 324 mg (324 mg Oral Given 4/16/25 0852)       Imaging results:  XR Chest 1 View  Result Date: 4/16/2025  Impression: No acute cardiopulmonary abnormality. Electronically Signed: Dee Valdez MD  4/16/2025 9:26 AM EDT  Workstation ID: [id number]      Social issues:   Social History     Socioeconomic History    Marital status:    Tobacco Use    Smoking status: Never     Passive exposure: Never    Smokeless tobacco: Never   Vaping Use    Vaping status: Never Used   Substance and Sexual Activity    Alcohol use: No    Drug use: No    Sexual activity: Defer       NIH Stroke Scale:  Interval: (not recorded)  1a. Level of Consciousness: (not recorded)  1b. LOC Questions: (not recorded)  1c. LOC Commands: (not recorded)  2. Best Gaze: (not recorded)  3. Visual: (not  recorded)  4. Facial Palsy: (not recorded)  5a. Motor Arm, Left: (not recorded)  5b. Motor Arm, Right: (not recorded)  6a. Motor Leg, Left: (not recorded)  6b. Motor Leg, Right: (not recorded)  7. Limb Ataxia: (not recorded)  8. Sensory: (not recorded)  9. Best Language: (not recorded)  10. Dysarthria: (not recorded)  11. Extinction and Inattention (formerly Neglect): (not recorded)    Total (NIH Stroke Scale): (not recorded)     Additional notable assessment information:     Nursing report ED to floor:  To Nurse Violet RN 1 OBS    Kota Leonard RN   04/16/25 12:14 EDT    30

## 2025-04-16 NOTE — CONSULTS
Referring Provider: Song Trejo MD    Reason for Consultation:  Chest pain      Patient Care Team:  Rama Quintanilla APRN as PCP - General  Rama Quintanilla APRN as PCP - Family Medicine  Trey Kennedy MD as Consulting Physician (Cardiology)  Bright Mcgarry MD as Consulting Physician (Hematology and Oncology)  Maximino Lynn MD as Cardiologist (Cardiology)      SUBJECTIVE     Chief Complaint:  Chest pain    History of present illness:  Donald Navarro is a 79 y.o. male  with history of CAD status post PCI to the LAD, hypertension, hyperlipidemia.   January 2025 presentation to the hospital with unstable angina where cardiac catheterization showed patent stent in the LAD.  Nonobstructive disease in the other vessels.  He gets coronary spasms which wake him from sleep.  He has had 2 more episodes since last cardiac catheterization.  Symptoms resolved after taking nitroglycerin.     Current cardiac medications include aspirin, amlodipine, Plavix, Zetia, lisinopril, Imdur, Toprol-XL and Crestor.    He presents to the hospital with chest pain.  ECG does not show any ischemic changes and high-sensitivity troponin is negative.  Cardiology has been consulted for further evaluation and management.    Review of systems:    Constitutional: No weakness, fatigue, fever, rigors, chills   Eyes: No vision changes, eye pain   ENT/oropharynx: No difficulty swallowing, sore throat, epistaxis, changes in hearing   Cardiovascular: No chest pain, chest tightness, palpitations, paroxysmal nocturnal dyspnea, orthopnea, diaphoresis, dizziness / syncopal episode   Respiratory: + shortness of breath, dyspnea on exertion, cough, wheezing, hemoptysis   Gastrointestinal: No abdominal pain, nausea, vomiting, diarrhea, bloody stools   Genitourinary: No hematuria, dysuria   Neurological: No headache, tremors, numbness, one-sided weakness    Musculoskeletal: No cramps, myalgias, joint pain, joint swelling   Integument: No rash,  edema        Personal History:      Past Medical History:   Diagnosis Date    CHF (congestive heart failure)     Coronary artery disease     GERD (gastroesophageal reflux disease)     Hyperlipidemia     Hypertension     Myocardial infarction        Past Surgical History:   Procedure Laterality Date    CARDIAC CATHETERIZATION  01/2007    TIA: proximal LAD    CARDIAC CATHETERIZATION N/A 07/09/2022    Procedure: Left Heart Cath possible PCI, atherectomy, hemodynamic support;  Surgeon: John Perez MD;  Location: [facility] CATH INVASIVE LOCATION;  Service: Cardiovascular;  Laterality: N/A;    CARDIAC CATHETERIZATION Right 1/19/2025    Procedure: Left Heart Cath;  Surgeon: Maximino Lynn MD;  Location: [facility] CATH INVASIVE LOCATION;  Service: Cardiovascular;  Laterality: Right;    COLONOSCOPY  07/2023    COLONOSCOPY N/A 06/27/2023    Procedure: COLONOSCOPY with terminal ileum biopsy's and cold snare polypectomy x 10;  Surgeon: Neymar Watson MD;  Location: [facility] ENDOSCOPY;  Service: Gastroenterology;  Laterality: N/A;  diverticulosis, internal hemorrhoids    HERNIA REPAIR      TOTAL HIP ARTHROPLASTY Left        Family History   Problem Relation Age of Onset    Parkinsonism Mother     Heart disease Father     Valvular heart disease Father     Pancreatic cancer Sister 85    Dementia Sister     Dementia Sister        Social History     Tobacco Use    Smoking status: Never     Passive exposure: Never    Smokeless tobacco: Never   Vaping Use    Vaping status: Never Used   Substance Use Topics    Alcohol use: No    Drug use: No        Home meds:  Prior to Admission medications    Medication Sig Start Date End Date Taking? Authorizing Provider   amLODIPine (NORVASC) 5 MG tablet Take 1 tablet by mouth Daily. 9/10/24   Maximino Lynn MD   aspirin 81 MG EC tablet Take 1 tablet by mouth Daily. Indications: Stable Angina Pectoris    Provider, MD Blayne   clopidogrel (PLAVIX) 75 MG tablet Take 1 tablet by mouth once daily 5/13/24  "  Licha Zamora MD   diphenhydrAMINE-acetaminophen (TYLENOL PM)  MG tablet per tablet Take 2 tablets by mouth At Night As Needed. 1/10/14   Blayne Scales MD   ezetimibe (ZETIA) 10 MG tablet Take 1 tablet by mouth Daily. 3/24/25   Maximino Lynn MD   famotidine (PEPCID) 20 MG tablet Take 1 tablet by mouth 2 (Two) Times a Day.  Patient taking differently: Take 2 tablets by mouth every night at bedtime.    Blayne Scales MD   fluticasone (FLONASE) 50 MCG/ACT nasal spray Administer 2 sprays into the nostril(s) as directed by provider Daily As Needed for Rhinitis.    Blayne Scales MD   isosorbide mononitrate (IMDUR) 60 MG 24 hr tablet Take 1 tablet by mouth 2 (Two) Times a Day. 1/13/25   Lisa Jimenez APRN   lisinopril (PRINIVIL,ZESTRIL) 5 MG tablet Take 1 tablet by mouth once daily 6/26/24   Licha Zamora MD   metoprolol succinate XL (TOPROL-XL) 25 MG 24 hr tablet Take 0.5 tablets by mouth Daily. 3/19/24   Edwige Jerome MD   nitroglycerin (NITROSTAT) 0.4 MG SL tablet 1 under the tongue as needed for angina, may repeat q5mins for up three doses 1/19/25   Mayra Galvan APRN   rosuvastatin (CRESTOR) 40 MG tablet Take 1 tablet by mouth Every Night. 9/30/24   Maximino Lynn MD       Allergies:     Adhesive tape    Scheduled Meds:sodium chloride, 10 mL, Intravenous, Q12H      Continuous Infusions:   PRN Meds:  senna-docusate sodium **AND** polyethylene glycol **AND** bisacodyl **AND** bisacodyl    nitroglycerin    [COMPLETED] Insert Peripheral IV **AND** sodium chloride    sodium chloride    sodium chloride      OBJECTIVE    Vital Signs  Vitals:    04/16/25 1114 04/16/25 1129 04/16/25 1148 04/16/25 1158   BP: 125/78 138/84 126/80 133/83   Pulse: 56 55 57 53   Resp:       Temp:       SpO2: 94% 96% 96% 95%   Weight:       Height:           Flowsheet Rows      Flowsheet Row First Filed Value   Admission Height 182.9 cm (72\") Documented at 04/16/2025 0827   Admission Weight 109 kg " (240 lb 11.9 oz) Documented at 04/16/2025 0827            No intake or output data in the 24 hours ending 04/16/25 1220     Telemetry:   Normal sinus rhythm    Physical Exam:  The patient is alert, oriented and in no distress.  Vital signs as noted above.  Head and neck revealed no carotid bruits or jugular venous distention.  No thyromegaly or lymphadenopathy is present  Lungs clear.  No wheezing.  Breath sounds are normal bilaterally.  Heart: Normal first and second heart sounds. No murmur.  No precordial rub is present.  No gallop is present.  Abdomen: Soft and nontender.  No organomegaly is present.  Extremities with good peripheral pulses without any pedal edema.  Skin: Warm and dry.  Musculoskeletal system is grossly normal.  CNS grossly normal.       Results Review:  I have personally reviewed the results from the time of this admission to 4/16/2025 12:20 EDT and agree with these findings:  []  Laboratory  []  Microbiology  []  Radiology  []  EKG/Telemetry   []  Cardiology/Vascular   []  Pathology  []  Old records  []  Other:    Most notable findings include:     Lab Results (last 24 hours)       Procedure Component Value Units Date/Time    High Sensitivity Troponin T 1Hr [[id number]] Collected: 04/16/25 1014    Specimen: Blood from Arm, Right Updated: 04/16/25 1051     HS Troponin T <6 ng/L      Troponin T Numeric Delta --     Comment: Unable to calculate.       Narrative:      High Sensitive Troponin T Reference Range:  <14.0 ng/L- Negative Female for AMI  <22.0 ng/L- Negative Male for AMI  >=14 - Abnormal Female indicating possible myocardial injury.  >=22 - Abnormal Male indicating possible myocardial injury.   Clinicians would have to utilize clinical acumen, EKG, Troponin, and serial changes to determine if it is an Acute Myocardial Infarction or myocardial injury due to an underlying chronic condition.         High Sensitivity Troponin T [[id number]]  (Normal) Collected: 04/16/25 0842    Specimen: Blood  from Arm, Right Updated: 04/16/25 0952     HS Troponin T 7 ng/L     Narrative:      High Sensitive Troponin T Reference Range:  <14.0 ng/L- Negative Female for AMI  <22.0 ng/L- Negative Male for AMI  >=14 - Abnormal Female indicating possible myocardial injury.  >=22 - Abnormal Male indicating possible myocardial injury.   Clinicians would have to utilize clinical acumen, EKG, Troponin, and serial changes to determine if it is an Acute Myocardial Infarction or myocardial injury due to an underlying chronic condition.         Comprehensive Metabolic Panel [[id number]]  (Abnormal) Collected: 04/16/25 0842    Specimen: Blood from Arm, Right Updated: 04/16/25 0952     Glucose 121 mg/dL      BUN 15 mg/dL      Creatinine 1.00 mg/dL      Sodium 140 mmol/L      Potassium 4.0 mmol/L      Chloride 106 mmol/L      CO2 21.4 mmol/L      Calcium 9.0 mg/dL      Total Protein 6.9 g/dL      Albumin 4.1 g/dL      ALT (SGPT) 22 U/L      AST (SGOT) 30 U/L      Alkaline Phosphatase 72 U/L      Total Bilirubin 0.9 mg/dL      Globulin 2.8 gm/dL      A/G Ratio 1.5 g/dL      BUN/Creatinine Ratio 15.0     Anion Gap 12.6 mmol/L      eGFR 77.5 mL/min/1.73     Narrative:      GFR Categories in Chronic Kidney Disease (CKD)      GFR Category          GFR (mL/min/1.73)    Interpretation  G1                     90 or greater         Normal or high (1)  G2                      60-89                Mild decrease (1)  G3a                   45-59                Mild to moderate decrease  G3b                   30-44                Moderate to severe decrease  G4                    15-29                Severe decrease  G5                    14 or less           Kidney failure          (1)In the absence of evidence of kidney disease, neither GFR category G1 or G2 fulfill the criteria for CKD.    eGFR calculation 2021 CKD-EPI creatinine equation, which does not include race as a factor    [place] Draw [[id number]] Collected: 04/16/25 0842    Specimen: Blood  from Arm, Right Updated: 04/16/25 0901    Narrative:      The following orders were created for panel order [place] Draw.  Procedure                               Abnormality         Status                     ---------                               -----------         ------                     Green Top (Gel)[[id number]]                                  Final result               Lavender Top[[id number]]                                     Final result               Gold Top - SST[[id number]]                                   Final result               Light Blue Top[[id number]]                                   Final result                 Please view results for these tests on the individual orders.    Green Top (Gel) [[id number]] Collected: 04/16/25 0842    Specimen: Blood from Arm, Right Updated: 04/16/25 0901     Extra Tube Hold for add-ons.     Comment: Auto resulted.       Lavender Top [[id number]] Collected: 04/16/25 0842    Specimen: Blood from Arm, Right Updated: 04/16/25 0901     Extra Tube hold for add-on     Comment: Auto resulted       Gold Top - SST [[id number]] Collected: 04/16/25 0842    Specimen: Blood from Arm, Right Updated: 04/16/25 0901     Extra Tube Hold for add-ons.     Comment: Auto resulted.       Light Blue Top [[id number]] Collected: 04/16/25 0842    Specimen: Blood from Arm, Right Updated: 04/16/25 0901     Extra Tube Hold for add-ons.     Comment: Auto resulted       CBC & Differential [[id number]]  (Abnormal) Collected: 04/16/25 0842    Specimen: Blood from Arm, Right Updated: 04/16/25 0850    Narrative:      The following orders were created for panel order CBC & Differential.  Procedure                               Abnormality         Status                     ---------                               -----------         ------                     CBC Auto Differential[[id number]]        Abnormal            Final result                 Please view results for these tests on the individual  orders.    CBC Auto Differential [[id number]]  (Abnormal) Collected: 04/16/25 0842    Specimen: Blood from Arm, Right Updated: 04/16/25 0850     WBC 8.31 10*3/mm3      RBC 5.50 10*6/mm3      Hemoglobin 15.9 g/dL      Hematocrit 48.6 %      MCV 88.4 fL      MCH 28.9 pg      MCHC 32.7 g/dL      RDW 13.2 %      RDW-SD 43.0 fl      MPV 11.2 fL      Platelets 162 10*3/mm3      Neutrophil % 70.2 %      Lymphocyte % 16.1 %      Monocyte % 10.1 %      Eosinophil % 2.6 %      Basophil % 0.8 %      Immature Grans % 0.2 %      Neutrophils, Absolute 5.82 10*3/mm3      Lymphocytes, Absolute 1.34 10*3/mm3      Monocytes, Absolute 0.84 10*3/mm3      Eosinophils, Absolute 0.22 10*3/mm3      Basophils, Absolute 0.07 10*3/mm3      Immature Grans, Absolute 0.02 10*3/mm3      nRBC 0.0 /100 WBC             Imaging Results (Last 24 Hours)       Procedure Component Value Units Date/Time    XR Chest 1 View [[id number]] Collected: 04/16/25 0922     Updated: 04/16/25 0929    Narrative:      XR CHEST 1 VW    Date of Exam: 4/16/2025 9:06 AM EDT    Indication: sob    Comparison: Chest x-ray 1/18/2025    Findings:  Cardiomegaly. No pneumothorax or pleural effusion or focal pulmonary parenchymal opacity. Pulmonary vessels are distinct. No change from previous exam.      Impression:      Impression:  No acute cardiopulmonary abnormality.      Electronically Signed: Dee Valdez MD    4/16/2025 9:26 AM EDT    Workstation ID: [id number]            LAB RESULTS (LAST 7 DAYS)    CBC  Results from last 7 days   Lab Units 04/16/25  0842   WBC 10*3/mm3 8.31   RBC 10*6/mm3 5.50   HEMOGLOBIN g/dL 15.9   HEMATOCRIT % 48.6   MCV fL 88.4   PLATELETS 10*3/mm3 162       BMP  Results from last 7 days   Lab Units 04/16/25  0842   SODIUM mmol/L 140   POTASSIUM mmol/L 4.0   CHLORIDE mmol/L 106   CO2 mmol/L 21.4*   BUN mg/dL 15   CREATININE mg/dL 1.00   GLUCOSE mg/dL 121*       CMP   Results from last 7 days   Lab Units 04/16/25  0842   SODIUM mmol/L 140   POTASSIUM  mmol/L 4.0   CHLORIDE mmol/L 106   CO2 mmol/L 21.4*   BUN mg/dL 15   CREATININE mg/dL 1.00   GLUCOSE mg/dL 121*   ALBUMIN g/dL 4.1   BILIRUBIN mg/dL 0.9   ALK PHOS U/L 72   AST (SGOT) U/L 30   ALT (SGPT) U/L 22       BNP        TROPONIN  Results from last 7 days   Lab Units 04/16/25  1014   HSTROP T ng/L <6       CoAg        Creatinine Clearance  Estimated Creatinine Clearance: 78.9 mL/min (by C-G formula based on SCr of 1 mg/dL).    ABG          Radiology  XR Chest 1 View  Result Date: 4/16/2025  Impression: No acute cardiopulmonary abnormality. Electronically Signed: Dee Valdez MD  4/16/2025 9:26 AM EDT  Workstation ID: [id number]        EKG  I personally viewed and interpreted the patient's EKG/Telemetry data:  ECG 12 Lead Chest Pain   Preliminary Result   HEART RATE=61  bpm   RR Jyqyvlvj=902  ms   CA Wavvhxpt=456  ms   P Horizontal Axis=16  deg   P Front Axis=25  deg   QRSD Interval=90  ms   QT Ijlljhql=129  ms   HZhP=064  ms   QRS Axis=-22  deg   T Wave Axis=154  deg   - NORMAL ECG -   Sinus rhythm   Date and Time of Study:2025-04-16 08:28:17            Echocardiogram:          Stress Test:  Results for orders placed during the hospital encounter of 03/04/20    Stress Test With Myocardial Perfusion One Day    Interpretation Summary  · Findings consistent with a normal ECG stress test.  · Left ventricular ejection fraction is normal (Calculated EF = 59%).  · Impressions are consistent with a low risk study.  · Small fixed distal inferolateral defect without any ischemia EF 60%.    No changes in EKG with lexiscan.  Correlation with myocardial perfusion images recommended        Cardiac Catheterization:  Results for orders placed during the hospital encounter of 01/18/25    Cardiac Catheterization/Vascular Study    Conclusion  OPERATORS:  1. Maximino Lynn M.D., Attending Cardiologist      PROCEDURE PERFORMED.  Ultrasound guided vascular access  Left heart catheterization  Coronary Angiogram  Moderate  Sedation    INDICATIONS FOR PROCEDURE.  79 years old man with multiple cardiovascular risk factors presented to the hospital with chest pain that woke him up from sleep.  Signs and symptoms were thought to be consistent with unstable angina.  After discussing the risk and benefits of the procedure he was brought to the cardiac Cath Lab for definitive coronary angiography.    PROCEDURE IN DETAIL.  Informed consent was obtained from the patient after explaining the risks, benefits, and alternative options of the procedure. After obtaining informed consent, the patient was brought to the cath lab and was prepped in a sterile fashion. Lidocaine 1% was used for local anesthesia into the right radial access site. The right radial artery was accessed under direct ultrasound visualization  with an angiocath needle via modified Seldinger technique. A 6F slender sheath was inserted successfully. Afterwards, 6F JR4  was advanced over a wire into the ascending aorta and used to cross the AV and obtain LV pressures.  AV gradient obtained via pullback technique. JR4 and JL3.5 diagnostic catheters were used to engage the ostia of the RCA and LM respectively. Images of the right and left coronary systems were obtained. All the catheters were exchanged over a wire and subsequently removed. The patient tolerated the procedure well without any complications. The pictures were reviewed at the end of the procedure. TR band applied to right wrist for hemostasis and inflated with 15 cc of air. No complications were encountered.    HEMODYNAMICS.  LV: 91/6, 9 mmHg  AO: 91/64, 76 mmHg  No significant gradient across the aortic valve during pullback of JR4 catheter.  LV gram was not performed due to recent echocardiogram.    FINDINGS.    Coronary Angiogram.    1. Left main. Left main is a large-caliber vessel which gives rise to the Left Anterior Descending and the Left circumflex.  Left main is angiographically free from any significant  disease    2. Left Anterior Descending Artery. LAD is a large vessel which gives rise to several septal perforators and several diagonal branches.  LAD has a patent stent in mid segment.  Jailed diagonal has 30% ostial stenosis.    3. Left Circumflex. The LCx is a medium caliber which gives rise to marginals.  Mid left circumflex has hazy 30% stenosis.    4. Right Coronary Artery. The RCA is a dominant vessel which gives rise to several small caliber branches including PDA and PLV.  RCA has diffuse luminal irregularities.  Sluggish flow throughout the vessel.    IMPRESSIONS.  1. Non-obstructive CAD with patent LAD stent  2.  Normal LVEDP    RECOMMENDATIONS.  1.  Uptitrate antianginal medications for microvascular disease.  2.  Evaluate for noncardiac/GI causes of chest pain  3.  Continue risk factors modification    Electronically signed by Maximino Lynn MD, 01/19/25, 10:30 AM EST.        Other:      ASSESSMENT & PLAN:    Active Problems:    Chest pain    Chest pain, angina  Coronary artery disease  Previous PCI to the LAD with slow flow in the LAD  ECG with normal acute ischemia.  High-sensitivity troponin negative x 2  Continue amlodipine, Imdur and metoprolol  Continue dual antiplatelet therapy and high intensity statin  Recent cardiac catheterization in January 2025 shows patent stent and normal LVEDP  PPI for possible GERD symptoms.  He believes he gets spasms of coronary arteries.  Reassurance provided to the patient.     Hypertension  Heart rate and blood pressure well-controlled     Hyperlipidemia  Continue Crestor  LDL 55, HDL 30, triglyceride 139 and total cholesterol 110     Lymphoma  In remission     Dyspnea on exertion  Echocardiogram shows preserved LV function, grade 1 diastolic dysfunction and no significant valvular abnormalities.  RVSP 38 mmHg.  LVEDP during cardiac catheterization is normal     Obesity  BMI is 32.7.  He weighs 240 pounds.  Lifestyle modifications recommended.  Screening and  treatment for sleep apnea recommended.    Maximino Lynn MD  04/16/25  12:20 EDT

## 2025-04-16 NOTE — DISCHARGE SUMMARY
[facility]    No primary care provider on file.    CHIEF COMPLAINT:     Chest pain    HISTORY OF PRESENT ILLNESS:    Obtained from admitting physician HPI on 4/16/2025:  78y/o M Patient is an individual with known coronary artery disease and history of two cardiac stents who presents with chest pain. Symptoms began at approximately 06:00 this morning with chest pressure described as worse than previous episodes. Patient has a known history of coronary vasospasm. The patient took two sublingual nitroglycerin tablets with partial relief. Associated symptoms included shortness of breath and left arm pain. Denies nausea, vomiting, or diaphoresis. Patient reports similar episodes on Saturday and yesterday morning that resolved with nitroglycerin. Last nitroglycerin dose was taken at approximately 6:45 AM. Patient was recently hospitalized in January 2025 and underwent cardiac catheterization, which revealed diagnosis of coronary vasospasm. Current episode is reported to be more severe than the January presentation.    04/16/25 (postobservation admission):  Patient confirms the HPI noted above including chest discomfort which has occurred on the previous 2 mornings described as pain squeezing between his back and chest radiating towards his jaw and arms with patient noting that he has a history of CAD and is concerned of the possibility of vasospasms.  Additionally he reports that he will often times wake due to coughing in the middle of the night though not specifically dyspneic.  No peripheral edema, palpitations, fever are reported.  He does not smoke or drink alcohol and is compliant with all of his outpatient medical therapies            Past Medical History:   Diagnosis Date    Benign prostatic hyperplasia with urinary obstruction 01/10/2014    Carpal tunnel syndrome, bilateral 01/10/2014    Combined forms of age-related cataract of both eyes 05/24/2022    Coronary artery disease      Degeneration of intervertebral disc of lumbosacral region 01/10/2014    Gastroesophageal reflux disease with hiatal hernia 01/24/2018    GERD (gastroesophageal reflux disease)     Hyperlipidemia     Hypertension     Insomnia 03/04/2020    Myocardial infarction     Obesity (BMI 30-39.9) 10/26/2016    Pulmonary sarcoidosis 01/10/2014    Seasonal allergies 03/04/2020    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 01/27/2025    Status post hip replacement 01/10/2014     Past Surgical History:   Procedure Laterality Date    CARDIAC CATHETERIZATION  01/2007    TIA: proximal LAD    CARDIAC CATHETERIZATION N/A 07/09/2022    Procedure: Left Heart Cath possible PCI, atherectomy, hemodynamic support;  Surgeon: John Perez MD;  Location: [facility] CATH INVASIVE LOCATION;  Service: Cardiovascular;  Laterality: N/A;    CARDIAC CATHETERIZATION Right 1/19/2025    Procedure: Left Heart Cath;  Surgeon: Maximino Lynn MD;  Location: [facility] CATH INVASIVE LOCATION;  Service: Cardiovascular;  Laterality: Right;    COLONOSCOPY  07/2023    COLONOSCOPY N/A 06/27/2023    Procedure: COLONOSCOPY with terminal ileum biopsy's and cold snare polypectomy x 10;  Surgeon: Neymar Watson MD;  Location: [facility] ENDOSCOPY;  Service: Gastroenterology;  Laterality: N/A;  diverticulosis, internal hemorrhoids    HERNIA REPAIR      TOTAL HIP ARTHROPLASTY Left      Family History   Problem Relation Age of Onset    Parkinsonism Mother     Heart disease Father     Valvular heart disease Father     Pancreatic cancer Sister 85    Dementia Sister     Dementia Sister      Social History     Tobacco Use    Smoking status: Never     Passive exposure: Never    Smokeless tobacco: Never   Vaping Use    Vaping status: Never Used   Substance Use Topics    Alcohol use: No    Drug use: No     Medications Prior to Admission   Medication Sig Dispense Refill Last Dose/Taking    amLODIPine (NORVASC) 5 MG tablet Take 1 tablet by mouth Daily. (Patient taking  differently: Take 1 tablet by mouth Every Night.) 90 tablet 3 4/15/2025    aspirin 81 MG EC tablet Take 1 tablet by mouth Daily. Indications: Stable Angina Pectoris   4/15/2025    clopidogrel (PLAVIX) 75 MG tablet Take 1 tablet by mouth once daily 90 tablet 3 4/16/2025    ezetimibe (ZETIA) 10 MG tablet Take 1 tablet by mouth Daily. 90 tablet 3 4/15/2025    famotidine (PEPCID) 40 MG tablet Take 1 tablet by mouth 2 (Two) Times a Day.   4/15/2025    isosorbide mononitrate (IMDUR) 60 MG 24 hr tablet Take 1 tablet by mouth 2 (Two) Times a Day. 90 tablet 3 4/16/2025    lisinopril (PRINIVIL,ZESTRIL) 5 MG tablet Take 1 tablet by mouth once daily 90 tablet 3 4/16/2025    metoprolol succinate XL (TOPROL-XL) 25 MG 24 hr tablet Take 0.5 tablets by mouth Daily. 90 tablet 3 4/16/2025    rosuvastatin (CRESTOR) 40 MG tablet Take 1 tablet by mouth Every Night. 90 tablet 3 4/15/2025    diphenhydrAMINE-acetaminophen (TYLENOL PM)  MG tablet per tablet Take 2 tablets by mouth At Night As Needed.       fluticasone (FLONASE) 50 MCG/ACT nasal spray Administer 2 sprays into the nostril(s) as directed by provider Daily As Needed for Rhinitis.       nitroglycerin (NITROSTAT) 0.4 MG SL tablet 1 under the tongue as needed for angina, may repeat q5mins for up three doses 100 tablet 0      Allergies:  Adhesive tape    Immunization History   Administered Date(s) Administered    COVID-19 (MODERNA) 12YRS+ (SPIKEVAX) 12/13/2023, 09/20/2024    COVID-19 (PFIZER) BIVALENT 12+YRS 10/11/2022    COVID-19 (PFIZER) Purple Cap Monovalent 02/08/2021, 03/01/2021, 10/13/2021    Fluad Quad 65+ 10/18/2019, 09/17/2020    Fluzone High-Dose 65+YRS 10/09/2015, 10/17/2016, 10/11/2017, 09/24/2018, 09/20/2024    Fluzone High-Dose 65+yrs 10/28/2021, 10/11/2022, 11/09/2023    H1N1 Inj 11/17/2009    Pneumococcal Conjugate 13-Valent (PCV13) 10/26/2016    Pneumococcal Polysaccharide (PPSV23) 11/25/2019           REVIEW OF SYSTEMS:    Review of Systems   Constitutional:  Negative.   HENT: Negative.     Eyes: Negative.    Cardiovascular:  Positive for chest pain.   Respiratory:  Positive for cough.    Skin: Negative.    Musculoskeletal: Negative.    Gastrointestinal: Negative.    Genitourinary: Negative.    Neurological: Negative.    Psychiatric/Behavioral: Negative.       Vital Signs  Temp:  [97.5 °F (36.4 °C)-98.1 °F (36.7 °C)] 97.6 °F (36.4 °C)  Heart Rate:  [50-68] 50  Resp:  [14-20] 15  BP: (111-154)/(67-88) 154/88          Physical Exam:  Physical Exam  Vitals reviewed.   Constitutional:       General: He is not in acute distress.     Appearance: Normal appearance. He is normal weight. He is not ill-appearing, toxic-appearing or diaphoretic.   HENT:      Head: Normocephalic.      Right Ear: External ear normal.      Left Ear: External ear normal.      Nose: Nose normal.      Mouth/Throat:      Mouth: Mucous membranes are moist.   Eyes:      Extraocular Movements: Extraocular movements intact.   Cardiovascular:      Rate and Rhythm: Normal rate and regular rhythm.      Pulses: Normal pulses.      Heart sounds: Normal heart sounds.   Pulmonary:      Effort: Pulmonary effort is normal.      Breath sounds: Normal breath sounds.   Abdominal:      General: Bowel sounds are normal.      Palpations: Abdomen is soft.      Tenderness: There is no abdominal tenderness.   Musculoskeletal:         General: Normal range of motion.      Cervical back: Normal range of motion.      Right lower leg: No edema.      Left lower leg: No edema.   Skin:     General: Skin is warm and dry.      Capillary Refill: Capillary refill takes less than 2 seconds.   Neurological:      General: No focal deficit present.      Mental Status: He is alert and oriented to person, place, and time.   Psychiatric:         Mood and Affect: Mood normal.         Behavior: Behavior normal.         Thought Content: Thought content normal.         Judgment: Judgment normal.       Emotional Behavior:    Normal   Debilities:  None  Results Review:    I reviewed the patient's new clinical results.  Lab Results (most recent)       Procedure Component Value Units Date/Time    High Sensitivity Troponin T 1Hr [[id number]] Collected: 04/16/25 1014    Specimen: Blood from Arm, Right Updated: 04/16/25 1051     HS Troponin T <6 ng/L      Troponin T Numeric Delta --     Comment: Unable to calculate.       Narrative:      High Sensitive Troponin T Reference Range:  <14.0 ng/L- Negative Female for AMI  <22.0 ng/L- Negative Male for AMI  >=14 - Abnormal Female indicating possible myocardial injury.  >=22 - Abnormal Male indicating possible myocardial injury.   Clinicians would have to utilize clinical acumen, EKG, Troponin, and serial changes to determine if it is an Acute Myocardial Infarction or myocardial injury due to an underlying chronic condition.         High Sensitivity Troponin T [[id number]]  (Normal) Collected: 04/16/25 0842    Specimen: Blood from Arm, Right Updated: 04/16/25 0952     HS Troponin T 7 ng/L     Narrative:      High Sensitive Troponin T Reference Range:  <14.0 ng/L- Negative Female for AMI  <22.0 ng/L- Negative Male for AMI  >=14 - Abnormal Female indicating possible myocardial injury.  >=22 - Abnormal Male indicating possible myocardial injury.   Clinicians would have to utilize clinical acumen, EKG, Troponin, and serial changes to determine if it is an Acute Myocardial Infarction or myocardial injury due to an underlying chronic condition.         Comprehensive Metabolic Panel [[id number]]  (Abnormal) Collected: 04/16/25 0842    Specimen: Blood from Arm, Right Updated: 04/16/25 0952     Glucose 121 mg/dL      BUN 15 mg/dL      Creatinine 1.00 mg/dL      Sodium 140 mmol/L      Potassium 4.0 mmol/L      Chloride 106 mmol/L      CO2 21.4 mmol/L      Calcium 9.0 mg/dL      Total Protein 6.9 g/dL      Albumin 4.1 g/dL      ALT (SGPT) 22 U/L      AST (SGOT) 30 U/L      Alkaline Phosphatase 72 U/L      Total  Bilirubin 0.9 mg/dL      Globulin 2.8 gm/dL      A/G Ratio 1.5 g/dL      BUN/Creatinine Ratio 15.0     Anion Gap 12.6 mmol/L      eGFR 77.5 mL/min/1.73     Narrative:      GFR Categories in Chronic Kidney Disease (CKD)      GFR Category          GFR (mL/min/1.73)    Interpretation  G1                     90 or greater         Normal or high (1)  G2                      60-89                Mild decrease (1)  G3a                   45-59                Mild to moderate decrease  G3b                   30-44                Moderate to severe decrease  G4                    15-29                Severe decrease  G5                    14 or less           Kidney failure          (1)In the absence of evidence of kidney disease, neither GFR category G1 or G2 fulfill the criteria for CKD.    eGFR calculation 2021 CKD-EPI creatinine equation, which does not include race as a factor    [place] Draw [[id number]] Collected: 04/16/25 0842    Specimen: Blood from Arm, Right Updated: 04/16/25 0901    Narrative:      The following orders were created for panel order [place] Draw.  Procedure                               Abnormality         Status                     ---------                               -----------         ------                     Green Top (Gel)[[id number]]                                  Final result               Lavender Top[[id number]]                                     Final result               Gold Top - SST[[id number]]                                   Final result               Light Blue Top[[id number]]                                   Final result                 Please view results for these tests on the individual orders.    Green Top (Gel) [[id number]] Collected: 04/16/25 0842    Specimen: Blood from Arm, Right Updated: 04/16/25 0901     Extra Tube Hold for add-ons.     Comment: Auto resulted.       Lavender Top [[id number]] Collected: 04/16/25 0842    Specimen: Blood from Arm, Right Updated: 04/16/25  0901     Extra Tube hold for add-on     Comment: Auto resulted       Gold Top - SST [[id number]] Collected: 04/16/25 0842    Specimen: Blood from Arm, Right Updated: 04/16/25 0901     Extra Tube Hold for add-ons.     Comment: Auto resulted.       Light Blue Top [[id number]] Collected: 04/16/25 0842    Specimen: Blood from Arm, Right Updated: 04/16/25 0901     Extra Tube Hold for add-ons.     Comment: Auto resulted       CBC & Differential [[id number]]  (Abnormal) Collected: 04/16/25 0842    Specimen: Blood from Arm, Right Updated: 04/16/25 0850    Narrative:      The following orders were created for panel order CBC & Differential.  Procedure                               Abnormality         Status                     ---------                               -----------         ------                     CBC Auto Differential[[id number]]        Abnormal            Final result                 Please view results for these tests on the individual orders.    CBC Auto Differential [[id number]]  (Abnormal) Collected: 04/16/25 0842    Specimen: Blood from Arm, Right Updated: 04/16/25 0850     WBC 8.31 10*3/mm3      RBC 5.50 10*6/mm3      Hemoglobin 15.9 g/dL      Hematocrit 48.6 %      MCV 88.4 fL      MCH 28.9 pg      MCHC 32.7 g/dL      RDW 13.2 %      RDW-SD 43.0 fl      MPV 11.2 fL      Platelets 162 10*3/mm3      Neutrophil % 70.2 %      Lymphocyte % 16.1 %      Monocyte % 10.1 %      Eosinophil % 2.6 %      Basophil % 0.8 %      Immature Grans % 0.2 %      Neutrophils, Absolute 5.82 10*3/mm3      Lymphocytes, Absolute 1.34 10*3/mm3      Monocytes, Absolute 0.84 10*3/mm3      Eosinophils, Absolute 0.22 10*3/mm3      Basophils, Absolute 0.07 10*3/mm3      Immature Grans, Absolute 0.02 10*3/mm3      nRBC 0.0 /100 WBC             Imaging Results (Most Recent)       Procedure Component Value Units Date/Time    XR Chest 1 View [[id number]] Collected: 04/16/25 0922     Updated: 04/16/25 0929    Narrative:      XR CHEST 1  VW    Date of Exam: 4/16/2025 9:06 AM EDT    Indication: sob    Comparison: Chest x-ray 1/18/2025    Findings:  Cardiomegaly. No pneumothorax or pleural effusion or focal pulmonary parenchymal opacity. Pulmonary vessels are distinct. No change from previous exam.      Impression:      Impression:  No acute cardiopulmonary abnormality.      Electronically Signed: Dee Valdez MD    4/16/2025 9:26 AM EDT    Workstation ID: [id number]          reviewed    ECG/EMG Results (most recent)       Procedure Component Value Units Date/Time    ECG 12 Lead Chest Pain [[id number]] Collected: 04/16/25 0828     Updated: 04/16/25 0830     QT Interval 378 ms      QTC Interval 381 ms     Narrative:      HEART RATE=61  bpm  RR Bblrzfzw=399  ms  GA Snclbxid=079  ms  P Horizontal Axis=16  deg  P Front Axis=25  deg  QRSD Interval=90  ms  QT Hlcmowxd=254  ms  NLxG=572  ms  QRS Axis=-22  deg  T Wave Axis=154  deg  - NORMAL ECG -  Sinus rhythm  Date and Time of Study:2025-04-16 08:28:17    Telemetry Scan [[id number]] Resulted: 04/16/25     Updated: 04/16/25 1544    Telemetry Scan [[id number]] Resulted: 04/16/25     Updated: 04/16/25 1617    Telemetry Scan [[id number]] Resulted: 04/16/25     Updated: 04/16/25 1653          reviewed            Microbiology Results (last 10 days)       ** No results found for the last 240 hours. **            Assessment & Plan     Chest pain    Coronary artery disease    Gastroesophageal reflux disease with hiatal hernia    Hyperlipidemia    Primary hypertension    Obesity (BMI 30-39.9)    Pulmonary sarcoidosis    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       Chest pain  Lab Results   Component Value Date    TROPONINT <6 04/16/2025    TROPONINT 7 04/16/2025    TROPONINT 7 01/18/2025   -Chest X-ray: No acute cardiopulmonary process  -EKG: Sinus rhythm at 61 without obvious acute changes with some baseline artifact noted and QTc of 381 ms  -In the ED pt given 324 mg aspirin  - Cardiology consulted who  recommended no further cardiac workup though did recommend outpatient sleep study and GI consultation  - GI consulted who recommend change from famotidine to pantoprazole which we prescribed at discharge and outpatient follow-up for possible endoscopy  -Telemetry  - Continue aspirin, beta-blocker, Imdur, Plavix, statin and encourage compliance with home Zetia    Hypertension  - Poorly controlled   BP Readings from Last 1 Encounters:   04/16/25 154/88   - Continue lisinopril, amlodipine and metoprolol  - Monitor while admitted    I discussed the patients findings and my recommendations with patient and nursing staff.     Discharge Diagnosis:      Chest pain    Coronary artery disease    Gastroesophageal reflux disease with hiatal hernia    Hyperlipidemia    Primary hypertension    Obesity (BMI 30-39.9)    Pulmonary sarcoidosis    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      Hospital Course  Patient is a 79 y.o. male presented with chest pain within HPI noted above.  Serial troponins were assessed found to be 7, less than 6 with chest x-ray showing no acute cardiopulmonary process and EKG showing sinus rhythm at 61 without obvious acute changes.  He was continued on telemetry without significant events reported.  Patient was given 324 mg aspirin in the ED and cardiology was consulted who recommended no further cardiac evaluation though they did recommend GI consultation as well as sleep study on an outpatient basis.  GI was consulted who evaluated patient recommending change from famotidine to pantoprazole which will be ordered at discharge as well as outpatient follow-up for possible endoscopies.  At this time patient is felt to be in good condition for discharge with close follow-up with his PCP as well as cardiology and GI on an outpatient basis.  His full testing/results and plan were discussed with patient along with concerning/alarm symptoms for which to call 911/return to the ED.  All questions  were answered he verbalizes his understanding and agreement.    Past Medical History:     Past Medical History:   Diagnosis Date    Benign prostatic hyperplasia with urinary obstruction 01/10/2014    Carpal tunnel syndrome, bilateral 01/10/2014    Combined forms of age-related cataract of both eyes 05/24/2022    Coronary artery disease     Degeneration of intervertebral disc of lumbosacral region 01/10/2014    Gastroesophageal reflux disease with hiatal hernia 01/24/2018    GERD (gastroesophageal reflux disease)     Hyperlipidemia     Hypertension     Insomnia 03/04/2020    Myocardial infarction     Obesity (BMI 30-39.9) 10/26/2016    Pulmonary sarcoidosis 01/10/2014    Seasonal allergies 03/04/2020    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 01/27/2025    Status post hip replacement 01/10/2014       Past Surgical History:     Past Surgical History:   Procedure Laterality Date    CARDIAC CATHETERIZATION  01/2007    TIA: proximal LAD    CARDIAC CATHETERIZATION N/A 07/09/2022    Procedure: Left Heart Cath possible PCI, atherectomy, hemodynamic support;  Surgeon: John Perez MD;  Location: [facility] CATH INVASIVE LOCATION;  Service: Cardiovascular;  Laterality: N/A;    CARDIAC CATHETERIZATION Right 1/19/2025    Procedure: Left Heart Cath;  Surgeon: Maximino Lynn MD;  Location: [facility] CATH INVASIVE LOCATION;  Service: Cardiovascular;  Laterality: Right;    COLONOSCOPY  07/2023    COLONOSCOPY N/A 06/27/2023    Procedure: COLONOSCOPY with terminal ileum biopsy's and cold snare polypectomy x 10;  Surgeon: Neymar Watson MD;  Location: [facility] ENDOSCOPY;  Service: Gastroenterology;  Laterality: N/A;  diverticulosis, internal hemorrhoids    HERNIA REPAIR      TOTAL HIP ARTHROPLASTY Left        Social History:   Social History     Socioeconomic History    Marital status:    Tobacco Use    Smoking status: Never     Passive exposure: Never    Smokeless tobacco: Never   Vaping Use    Vaping status: Never  Used   Substance and Sexual Activity    Alcohol use: No    Drug use: No    Sexual activity: Defer       Procedures Performed         Consults:   Consults       Date and Time Order Name Status Description    4/16/2025  2:29 PM Inpatient Gastroenterology Consult Completed     4/16/2025 11:36 AM Inpatient Cardiology Consult Completed             Condition on Discharge:     Stable    Discharge Disposition  Home or Self Care    Discharge Medications     Discharge Medications        New Medications        Instructions Start Date   pantoprazole 40 MG EC tablet  Commonly known as: PROTONIX   40 mg, Oral, Every Early Morning   Start Date: April 17, 2025            Changes to Medications        Instructions Start Date   amLODIPine 5 MG tablet  Commonly known as: NORVASC  What changed: when to take this   5 mg, Oral, Daily             Continue These Medications        Instructions Start Date   aspirin 81 MG EC tablet   81 mg, Daily      clopidogrel 75 MG tablet  Commonly known as: PLAVIX   Take 1 tablet by mouth once daily      diphenhydrAMINE-acetaminophen  MG tablet per tablet  Commonly known as: TYLENOL PM   2 tablets, Nightly PRN      ezetimibe 10 MG tablet  Commonly known as: ZETIA   10 mg, Oral, Daily      fluticasone 50 MCG/ACT nasal spray  Commonly known as: FLONASE   2 sprays, Daily PRN      isosorbide mononitrate 60 MG 24 hr tablet  Commonly known as: IMDUR   60 mg, Oral, 2 Times Daily      lisinopril 5 MG tablet  Commonly known as: PRINIVIL,ZESTRIL   5 mg, Oral, Daily      metoprolol succinate XL 25 MG 24 hr tablet  Commonly known as: TOPROL-XL   12.5 mg, Oral, Daily      nitroglycerin 0.4 MG SL tablet  Commonly known as: NITROSTAT   1 under the tongue as needed for angina, may repeat q5mins for up three doses      rosuvastatin 40 MG tablet  Commonly known as: CRESTOR   40 mg, Oral, Nightly             Stop These Medications      famotidine 40 MG tablet  Commonly known as: PEPCID              Discharge Diet:      Activity at Discharge:     Follow-up Appointments  Future Appointments   Date Time Provider Department Center   4/21/2025 11:30 AM Rama Quintanilla APRN [facility]   4/29/2025 11:00 AM Bright Mcgarry MD [facility]     Additional Instructions for the Follow-ups that You Need to Schedule       Discharge Follow-up with PCP   As directed       Currently Documented PCP:    No primary care provider on file.    PCP Phone Number:    None     Follow Up Details: 5 to 7 days        Discharge Follow-up with Specified Provider: Cardiology   As directed      To: Cardiology        Discharge Follow-up with Specified Provider: GI   As directed      To: GI                Test Results Pending at Discharge  Pending Results       None             Risk for Readmission (LACE) Score: 4 (4/16/2025  1:03 PM)      Greater than 30 minutes spent in discharge activities for this patient    Signature:Electronically signed by Ari Coles PA-C, 04/16/25, 4:59 PM EDT.

## 2025-04-16 NOTE — ED PROVIDER NOTES
Subjective   History of Present Illness  78y/o M Patient is an individual with known coronary artery disease and history of two cardiac stents who presents with chest pain. Symptoms began at approximately 06:00 this morning with chest pressure described as worse than previous episodes. Patient has a known history of coronary vasospasm. The patient took two sublingual nitroglycerin tablets with partial relief. Associated symptoms included shortness of breath and left arm pain. Denies nausea, vomiting, or diaphoresis. Patient reports similar episodes on Saturday and yesterday morning that resolved with nitroglycerin. Last nitroglycerin dose was taken at approximately 6:45 AM. Patient was recently hospitalized in January 2025 and underwent cardiac catheterization, which revealed diagnosis of coronary vasospasm. Current episode is reported to be more severe than the January presentation.  Review of Systems  See HPI  Past Medical History:   Diagnosis Date    Benign prostatic hyperplasia with urinary obstruction 01/10/2014    Carpal tunnel syndrome, bilateral 01/10/2014    Combined forms of age-related cataract of both eyes 05/24/2022    Coronary artery disease     Degeneration of intervertebral disc of lumbosacral region 01/10/2014    Gastroesophageal reflux disease with hiatal hernia 01/24/2018    GERD (gastroesophageal reflux disease)     Hyperlipidemia     Hypertension     Insomnia 03/04/2020    Myocardial infarction     Obesity (BMI 30-39.9) 10/26/2016    Pulmonary sarcoidosis 01/10/2014    Seasonal allergies 03/04/2020    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 01/27/2025    Status post hip replacement 01/10/2014       Allergies   Allergen Reactions    Adhesive Tape Itching       Past Surgical History:   Procedure Laterality Date    CARDIAC CATHETERIZATION  01/2007    TIA: proximal LAD    CARDIAC CATHETERIZATION N/A 07/09/2022    Procedure: Left Heart Cath possible PCI, atherectomy, hemodynamic  "support;  Surgeon: John Perez MD;  Location: [facility] CATH INVASIVE LOCATION;  Service: Cardiovascular;  Laterality: N/A;    CARDIAC CATHETERIZATION Right 1/19/2025    Procedure: Left Heart Cath;  Surgeon: Maximino Lynn MD;  Location: [facility] CATH INVASIVE LOCATION;  Service: Cardiovascular;  Laterality: Right;    COLONOSCOPY  07/2023    COLONOSCOPY N/A 06/27/2023    Procedure: COLONOSCOPY with terminal ileum biopsy's and cold snare polypectomy x 10;  Surgeon: Neymar Watson MD;  Location: [facility] ENDOSCOPY;  Service: Gastroenterology;  Laterality: N/A;  diverticulosis, internal hemorrhoids    HERNIA REPAIR      TOTAL HIP ARTHROPLASTY Left        Family History   Problem Relation Age of Onset    Parkinsonism Mother     Heart disease Father     Valvular heart disease Father     Pancreatic cancer Sister 85    Dementia Sister     Dementia Sister        Social History     Socioeconomic History    Marital status:    Tobacco Use    Smoking status: Never     Passive exposure: Never    Smokeless tobacco: Never   Vaping Use    Vaping status: Never Used   Substance and Sexual Activity    Alcohol use: No    Drug use: No    Sexual activity: Defer           Objective   Physical Exam  General: Alert and oriented, no acute distress  HEENT: No scleral icterus  Cardiovascular: Regular rate and rhythm, no murmur appreciated  Respiratory: No tachypnea or increased work of breathing, clear to auscultation bilaterally  Abdomen: Soft, non-tender, without rebound or guarding  Extremities: Intact posterior tibial, dorsalis pedis, and radial pulses bilaterally  Procedures           ED Course      /85 (BP Location: Left arm, Patient Position: Lying)   Pulse 53   Temp 97.5 °F (36.4 °C) (Oral)   Resp 14   Ht 182.9 cm (72\")   Wt 109 kg (240 lb 11.9 oz)   SpO2 97%   BMI 32.65 kg/m²   Labs Reviewed   COMPREHENSIVE METABOLIC PANEL - Abnormal; Notable for the following components:       Result Value    Glucose 121 (*)     CO2 " 21.4 (*)     All other components within normal limits    Narrative:     GFR Categories in Chronic Kidney Disease (CKD)      GFR Category          GFR (mL/min/1.73)    Interpretation  G1                     90 or greater         Normal or high (1)  G2                      60-89                Mild decrease (1)  G3a                   45-59                Mild to moderate decrease  G3b                   30-44                Moderate to severe decrease  G4                    15-29                Severe decrease  G5                    14 or less           Kidney failure          (1)In the absence of evidence of kidney disease, neither GFR category G1 or G2 fulfill the criteria for CKD.    eGFR calculation 2021 CKD-EPI creatinine equation, which does not include race as a factor   CBC WITH AUTO DIFFERENTIAL - Abnormal; Notable for the following components:    Lymphocyte % 16.1 (*)     All other components within normal limits   TROPONIN - Normal    Narrative:     High Sensitive Troponin T Reference Range:  <14.0 ng/L- Negative Female for AMI  <22.0 ng/L- Negative Male for AMI  >=14 - Abnormal Female indicating possible myocardial injury.  >=22 - Abnormal Male indicating possible myocardial injury.   Clinicians would have to utilize clinical acumen, EKG, Troponin, and serial changes to determine if it is an Acute Myocardial Infarction or myocardial injury due to an underlying chronic condition.        RAINBOW DRAW    Narrative:     The following orders were created for panel order [place] Draw.  Procedure                               Abnormality         Status                     ---------                               -----------         ------                     Green Top (Gel)[[id number]]                                  Final result               Lavender Top[[id number]]                                     Final result               Gold Top - SST[[id number]]                                   Final result                Light Blue Top[[id number]]                                   Final result                 Please view results for these tests on the individual orders.   HIGH SENSITIVITIY TROPONIN T 1HR    Narrative:     High Sensitive Troponin T Reference Range:  <14.0 ng/L- Negative Female for AMI  <22.0 ng/L- Negative Male for AMI  >=14 - Abnormal Female indicating possible myocardial injury.  >=22 - Abnormal Male indicating possible myocardial injury.   Clinicians would have to utilize clinical acumen, EKG, Troponin, and serial changes to determine if it is an Acute Myocardial Infarction or myocardial injury due to an underlying chronic condition.        GREEN TOP   LAVENDER TOP   GOLD TOP - SST   LIGHT BLUE TOP   CBC AND DIFFERENTIAL    Narrative:     The following orders were created for panel order CBC & Differential.  Procedure                               Abnormality         Status                     ---------                               -----------         ------                     CBC Auto Differential[[id number]]        Abnormal            Final result                 Please view results for these tests on the individual orders.     Medications   sodium chloride 0.9 % flush 10 mL (has no administration in time range)   nitroglycerin (NITROSTAT) SL tablet 0.4 mg (0.4 mg Sublingual Given 4/16/25 0852)   sodium chloride 0.9 % flush 10 mL (10 mL Intravenous Given 4/16/25 1143)   sodium chloride 0.9 % flush 10 mL (has no administration in time range)   sodium chloride 0.9 % infusion 40 mL (has no administration in time range)   sennosides-docusate (PERICOLACE) 8.6-50 MG per tablet 2 tablet (has no administration in time range)     And   polyethylene glycol (MIRALAX) packet 17 g (has no administration in time range)     And   bisacodyl (DULCOLAX) EC tablet 5 mg (has no administration in time range)     And   bisacodyl (DULCOLAX) suppository 10 mg (has no administration in time range)   amLODIPine (NORVASC) tablet 5 mg  (has no administration in time range)   isosorbide mononitrate (IMDUR) 24 hr tablet 60 mg (has no administration in time range)   lisinopril (PRINIVIL,ZESTRIL) tablet 5 mg (has no administration in time range)   metoprolol succinate XL (TOPROL-XL) 24 hr tablet 12.5 mg (has no administration in time range)   rosuvastatin (CRESTOR) tablet 40 mg (has no administration in time range)   acetaminophen (TYLENOL) tablet 650 mg (650 mg Oral Given 4/16/25 1343)   pantoprazole (PROTONIX) EC tablet 40 mg (has no administration in time range)   aspirin chewable tablet 324 mg (324 mg Oral Given 4/16/25 7785)     XR Chest 1 View   Final Result   Impression:   No acute cardiopulmonary abnormality.         Electronically Signed: Dee Valdez MD     4/16/2025 9:26 AM EDT     Workstation ID: [id number]                    HEART Score: 5   Shared Decision Making  I discussed the findings with the patient/patient representative who is in agreement with the treatment plan and the final disposition.  Risks and benefits of discharge and/or observation/admission were discussed: Yes                                      Medical Decision Making  Problems Addressed:  Chest pain, unspecified type: complicated acute illness or injury    Amount and/or Complexity of Data Reviewed  Labs: ordered.  Radiology: ordered.  ECG/medicine tests: ordered.    Risk  OTC drugs.  Prescription drug management.  Decision regarding hospitalization.      EKG interpretation: 8:28 AM, rate 61, normal sinus rhythm, normal axis, normal intervals, slight T wave inversions in lateral leads.    My interpretation of chest x-ray is no focal infiltrate or pneumothorax.  See system radiology interpretation.    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  Got significant relief with 2 nitro he took earlier and he states the last the pain he had resolved with nitroglycerin here.  Did have shortness of breath and pain rating down the left arm.  Patient did have nonobstructive CAD  on recent cath but given patient with continued anginal symptoms, elevated heart score, will place in observation.  Final diagnoses:   Chest pain, unspecified type       ED Disposition  ED Disposition       ED Disposition   Decision to Admit    Condition   --    Comment   --               No follow-up provider specified.       Medication List        ASK your doctor about these medications      famotidine 40 MG tablet  Commonly known as: PEPCID  Ask about: Which instructions should I use?                 Song Trejo MD  04/16/25 1823

## 2025-04-16 NOTE — CONSULTS
GI CONSULT  NOTE:    Referring Provider: Dr. Trejo    Chief complaint: History of GERD and hiatal hernia    Subjective .     History of present illness: Donald Navarro is a 79 y.o. male  with a past medical history of CAD s/p PCI to LAD, hypertension, hyperlipidemia, GERD, hiatal hernia, obesity, pulmonary sarcoidosis, MI who presented to [facility] ED on 4/16/2025 with complaints of chest pain.  GI was consulted for history of GERD and hiatal hernia.  Patient reports sudden onset of substernal pain on 4/12/2025.  Reports awakening from sleep around 4-6 AM every morning due to pain.  Patient reports partial relief of chest pain with 2 sublingual nitroglycerin tablets this morning.  Reports heartburn/reflux despite taking Pepcid 40 mg daily.  Denies dysphagia, but reports burning sensation when drinking coffee.  Denies abdominal pain, nausea, or vomiting.  Denies unintentional weight loss.      Endo History:  6/2023 colonoscopy by Dr. 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 Medina -medium hiatal hernia, erosive gastritis with gastric biopsies H. pylori negative    Past Medical History:  Past Medical History:   Diagnosis Date    Benign prostatic hyperplasia with urinary obstruction 01/10/2014    Carpal tunnel syndrome, bilateral 01/10/2014    Combined forms of age-related cataract of both eyes 05/24/2022    Coronary artery disease     Degeneration of intervertebral disc of lumbosacral region 01/10/2014    Gastroesophageal reflux disease with hiatal hernia 01/24/2018    GERD (gastroesophageal reflux disease)     Hyperlipidemia     Hypertension     Insomnia 03/04/2020    Myocardial infarction     Obesity (BMI 30-39.9) 10/26/2016    Pulmonary sarcoidosis 01/10/2014    Seasonal allergies  03/04/2020    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 01/27/2025    Status post hip replacement 01/10/2014       Past Surgical History:  Past Surgical History:   Procedure Laterality Date    CARDIAC CATHETERIZATION  01/2007    TIA: proximal LAD    CARDIAC CATHETERIZATION N/A 07/09/2022    Procedure: Left Heart Cath possible PCI, atherectomy, hemodynamic support;  Surgeon: John Perez MD;  Location: [facility] CATH INVASIVE LOCATION;  Service: Cardiovascular;  Laterality: N/A;    CARDIAC CATHETERIZATION Right 1/19/2025    Procedure: Left Heart Cath;  Surgeon: Maximino Lynn MD;  Location: [facility] CATH INVASIVE LOCATION;  Service: Cardiovascular;  Laterality: Right;    COLONOSCOPY  07/2023    COLONOSCOPY N/A 06/27/2023    Procedure: COLONOSCOPY with terminal ileum biopsy's and cold snare polypectomy x 10;  Surgeon: Neymar Watson MD;  Location: [facility] ENDOSCOPY;  Service: Gastroenterology;  Laterality: N/A;  diverticulosis, internal hemorrhoids    HERNIA REPAIR      TOTAL HIP ARTHROPLASTY Left        Social History:  Social History     Tobacco Use    Smoking status: Never     Passive exposure: Never    Smokeless tobacco: Never   Vaping Use    Vaping status: Never Used   Substance Use Topics    Alcohol use: No    Drug use: No       Family History:  Family History   Problem Relation Age of Onset    Parkinsonism Mother     Heart disease Father     Valvular heart disease Father     Pancreatic cancer Sister 85    Dementia Sister     Dementia Sister        Medications:  Medications Prior to Admission   Medication Sig Dispense Refill Last Dose/Taking    amLODIPine (NORVASC) 5 MG tablet Take 1 tablet by mouth Daily. (Patient taking differently: Take 1 tablet by mouth Every Night.) 90 tablet 3 4/15/2025    aspirin 81 MG EC tablet Take 1 tablet by mouth Daily. Indications: Stable Angina Pectoris   4/15/2025    clopidogrel (PLAVIX) 75 MG tablet Take 1 tablet by mouth once daily 90 tablet 3 4/16/2025    ezetimibe  (ZETIA) 10 MG tablet Take 1 tablet by mouth Daily. 90 tablet 3 4/15/2025    famotidine (PEPCID) 40 MG tablet Take 1 tablet by mouth 2 (Two) Times a Day.   4/15/2025    isosorbide mononitrate (IMDUR) 60 MG 24 hr tablet Take 1 tablet by mouth 2 (Two) Times a Day. 90 tablet 3 4/16/2025    lisinopril (PRINIVIL,ZESTRIL) 5 MG tablet Take 1 tablet by mouth once daily 90 tablet 3 4/16/2025    metoprolol succinate XL (TOPROL-XL) 25 MG 24 hr tablet Take 0.5 tablets by mouth Daily. 90 tablet 3 4/16/2025    rosuvastatin (CRESTOR) 40 MG tablet Take 1 tablet by mouth Every Night. 90 tablet 3 4/15/2025    diphenhydrAMINE-acetaminophen (TYLENOL PM)  MG tablet per tablet Take 2 tablets by mouth At Night As Needed.       fluticasone (FLONASE) 50 MCG/ACT nasal spray Administer 2 sprays into the nostril(s) as directed by provider Daily As Needed for Rhinitis.       nitroglycerin (NITROSTAT) 0.4 MG SL tablet 1 under the tongue as needed for angina, may repeat q5mins for up three doses 100 tablet 0        Scheduled Meds:amLODIPine, 5 mg, Oral, Nightly  famotidine, 40 mg, Oral, BID  isosorbide mononitrate, 60 mg, Oral, BID  [START ON 4/17/2025] lisinopril, 5 mg, Oral, Daily  [START ON 4/17/2025] metoprolol succinate XL, 12.5 mg, Oral, Daily  rosuvastatin, 40 mg, Oral, Nightly  sodium chloride, 10 mL, Intravenous, Q12H      Continuous Infusions:   PRN Meds:.  acetaminophen    senna-docusate sodium **AND** polyethylene glycol **AND** bisacodyl **AND** bisacodyl    nitroglycerin    [COMPLETED] Insert Peripheral IV **AND** sodium chloride    sodium chloride    sodium chloride    ALLERGIES:  Adhesive tape    ROS:  The following systems were reviewed and negative;   Constitution:  No fevers, chills, no unintentional weight loss  Skin: no rash, no jaundice  Eyes:  No blurry vision, no eye pain  HENT:  No change in hearing or smell  Resp:  No dyspnea or cough  CV:  + chest pain, no palpitations  :  No dysuria, hematuria  Musculoskeletal:   "No leg cramps or arthralgias  Neuro:  No tremor, no numbness  Psych:  No depression or confusion    Objective     Vital Signs:   Vitals:    04/16/25 1129 04/16/25 1148 04/16/25 1158 04/16/25 1304   BP: 138/84 126/80 133/83 148/85   BP Location:    Left arm   Patient Position:    Lying   Pulse: 55 57 53 53   Resp:    14   Temp:    97.5 °F (36.4 °C)   TempSrc:    Oral   SpO2: 96% 96% 95% 97%   Weight:    109 kg (240 lb 11.9 oz)   Height:    182.9 cm (72\")       Physical Exam:       General Appearance:    Awake and alert, in no acute distress, laying in bed   Head:    Normocephalic, without obvious abnormality, atraumatic       Lungs:     Respirations regular, even and unlabored, room air   Chest Wall:    No abnormalities observed   Abdomen:     Soft, non-tender, no rebound or guarding, non-distended       Extremities:   Moves all extremities, no edema, no cyanosis       Skin:   No rash, no jaundice, normal palpation       Neurologic:   Cranial nerves 2 - 12 grossly intact       Results Review:   I reviewed the patient's labs and imaging.  Results from last 7 days   Lab Units 04/16/25  0842   WBC 10*3/mm3 8.31   HEMOGLOBIN g/dL 15.9   PLATELETS 10*3/mm3 162     Results from last 7 days   Lab Units 04/16/25  0842   SODIUM mmol/L 140   POTASSIUM mmol/L 4.0   CHLORIDE mmol/L 106   CO2 mmol/L 21.4*   BUN mg/dL 15   CREATININE mg/dL 1.00   GLUCOSE mg/dL 121*   ALBUMIN g/dL 4.1   BILIRUBIN mg/dL 0.9   ALK PHOS U/L 72   AST (SGOT) U/L 30   ALT (SGPT) U/L 22     Estimated Creatinine Clearance: 78.9 mL/min (by C-G formula based on SCr of 1 mg/dL).  No results found for: \"HGBA1C\"  Results from last 7 days   Lab Units 04/16/25  1014 04/16/25  0842   HSTROP T ng/L <6 7     Infection     UA      Microbiology Results (last 10 days)       ** No results found for the last 240 hours. **          Imaging Results (Last 72 Hours)       Procedure Component Value Units Date/Time    XR Chest 1 View [[id number]] Collected: 04/16/25 0922     " Updated: 04/16/25 0929    Narrative:      XR CHEST 1 VW    Date of Exam: 4/16/2025 9:06 AM EDT    Indication: sob    Comparison: Chest x-ray 1/18/2025    Findings:  Cardiomegaly. No pneumothorax or pleural effusion or focal pulmonary parenchymal opacity. Pulmonary vessels are distinct. No change from previous exam.      Impression:      Impression:  No acute cardiopulmonary abnormality.      Electronically Signed: Dee Valdez MD    4/16/2025 9:26 AM EDT    Workstation ID: [id number]          Patient is a 79-year-old malewho presented to [facility] ED on 4/16/2025 with complaints of chest pain.  GI was consulted for history of GERD and hiatal hernia.      ASSESSMENT;  GERD  Hiatal hernia  Chest pain  History of CAD s/p PCI  History of obesity  History of hyperlipidemia  History of hypertension      PLAN:  - Cardiology consulted.  Reportedly ECG without ischemic changes.  High-sensitivity troponin negative.  Recent cardiac catheterization in 1/2025 showed patent and normal LVEDP.  Reassurance provided to patient for concerns of coronary artery spasms.  - Patient possibly waking up due to GERD.  Start PPI daily.  Recommend GERD diet and lifestyle modifications with elevated head of bed.  - 1/2018 EGD showed medium size hiatal hernia with erosive gastritis.  - No plans for further evaluation with endoscopy at this time.  Okay to discharge home from GI standpoint.  Will send task to our office to set up outpatient follow-up appointment.  - Supportive care.      I discussed the patients findings and my recommendations with the patient.    We appreciate the referral    Electronically signed by CHEMO Killian, 04/16/25, 2:42 PM EDT.

## 2025-04-16 NOTE — PLAN OF CARE
Problem: Adult Inpatient Plan of Care  Goal: Absence of Hospital-Acquired Illness or Injury  Intervention: Identify and Manage Fall Risk  Recent Flowsheet Documentation  Taken 4/16/2025 1304 by Candi Roberson RN  Safety Promotion/Fall Prevention:   assistive device/personal items within reach   clutter free environment maintained   safety round/check completed   nonskid shoes/slippers when out of bed  Intervention: Prevent Skin Injury  Recent Flowsheet Documentation  Taken 4/16/2025 1304 by Candi Roberson RN  Body Position: position changed independently  Goal: Optimal Comfort and Wellbeing  Intervention: Provide Person-Centered Care  Recent Flowsheet Documentation  Taken 4/16/2025 1304 by Candi Roberson RN  Trust Relationship/Rapport: care explained  Goal: Readiness for Transition of Care  Intervention: Mutually Develop Transition Plan  Recent Flowsheet Documentation  Taken 4/16/2025 1301 by Candi Roberson RN  Transportation Anticipated: car, drives self  Patient/Family Anticipated Services at Transition: none  Patient/Family Anticipates Transition to: home with family  Taken 4/16/2025 1259 by Candi Roberson RN  Equipment Currently Used at Home: walker, standard   Goal Outcome Evaluation:

## 2025-04-17 LAB
QT INTERVAL: 378 MS
QTC INTERVAL: 381 MS

## 2025-04-21 ENCOUNTER — OFFICE VISIT (OUTPATIENT)
Dept: FAMILY MEDICINE CLINIC | Facility: CLINIC | Age: 80
End: 2025-04-21
Payer: MEDICARE

## 2025-04-21 VITALS
HEART RATE: 62 BPM | SYSTOLIC BLOOD PRESSURE: 113 MMHG | HEIGHT: 72 IN | TEMPERATURE: 97.5 F | BODY MASS INDEX: 32.95 KG/M2 | OXYGEN SATURATION: 96 % | RESPIRATION RATE: 16 BRPM | WEIGHT: 243.3 LBS | DIASTOLIC BLOOD PRESSURE: 68 MMHG

## 2025-04-21 DIAGNOSIS — J30.2 SEASONAL ALLERGIES: Chronic | ICD-10-CM

## 2025-04-21 DIAGNOSIS — R53.83 OTHER FATIGUE: ICD-10-CM

## 2025-04-21 DIAGNOSIS — E78.5 HYPERLIPIDEMIA, UNSPECIFIED HYPERLIPIDEMIA TYPE: Primary | Chronic | ICD-10-CM

## 2025-04-21 DIAGNOSIS — H93.13 TINNITUS OF BOTH EARS: ICD-10-CM

## 2025-04-21 DIAGNOSIS — R07.9 CHEST PAIN, UNSPECIFIED TYPE: ICD-10-CM

## 2025-04-21 DIAGNOSIS — E66.811 CLASS 1 OBESITY DUE TO EXCESS CALORIES WITH SERIOUS COMORBIDITY AND BODY MASS INDEX (BMI) OF 33.0 TO 33.9 IN ADULT: ICD-10-CM

## 2025-04-21 DIAGNOSIS — E66.09 CLASS 1 OBESITY DUE TO EXCESS CALORIES WITH SERIOUS COMORBIDITY AND BODY MASS INDEX (BMI) OF 33.0 TO 33.9 IN ADULT: ICD-10-CM

## 2025-04-21 PROCEDURE — G0439 PPPS, SUBSEQ VISIT: HCPCS | Performed by: NURSE PRACTITIONER

## 2025-04-21 PROCEDURE — 1126F AMNT PAIN NOTED NONE PRSNT: CPT | Performed by: NURSE PRACTITIONER

## 2025-04-21 PROCEDURE — 3078F DIAST BP <80 MM HG: CPT | Performed by: NURSE PRACTITIONER

## 2025-04-21 PROCEDURE — 3074F SYST BP LT 130 MM HG: CPT | Performed by: NURSE PRACTITIONER

## 2025-04-21 PROCEDURE — 1170F FXNL STATUS ASSESSED: CPT | Performed by: NURSE PRACTITIONER

## 2025-04-21 NOTE — PROGRESS NOTES
Subjective   The ABCs of the Annual Wellness Visit  Medicare Wellness Visit      Donald Navarro is a 79 y.o. patient who presents for a Medicare Wellness Visit.    The following portions of the patient's history were reviewed and   updated as appropriate: allergies, current medications, past medical history, past social history, past surgical history, and problem list.    Compared to one year ago, the patient's physical   health is the same.  Compared to one year ago, the patient's mental   health is the same.    Recent Hospitalizations:  He was not admitted to the hospital during the last year.     Current Medical Providers:  Patient Care Team:  Rama Quintanilla APRN as PCP - General (Nurse Practitioner)  Rama Quintanilla APRN as PCP - Family Medicine  Bright Mcgarry MD as Consulting Physician (Hematology and Oncology)  Maximino Lynn MD as Cardiologist (Cardiology)    Outpatient Medications Prior to Visit   Medication Sig Dispense Refill    amLODIPine (NORVASC) 5 MG tablet Take 1 tablet by mouth Daily. (Patient taking differently: Take 1 tablet by mouth Every Night.) 90 tablet 3    aspirin 81 MG EC tablet Take 1 tablet by mouth Daily. Indications: Stable Angina Pectoris      clopidogrel (PLAVIX) 75 MG tablet Take 1 tablet by mouth once daily 90 tablet 3    diphenhydrAMINE-acetaminophen (TYLENOL PM)  MG tablet per tablet Take 2 tablets by mouth At Night As Needed.      ezetimibe (ZETIA) 10 MG tablet Take 1 tablet by mouth Daily. 90 tablet 3    fluticasone (FLONASE) 50 MCG/ACT nasal spray Administer 2 sprays into the nostril(s) as directed by provider Daily As Needed for Rhinitis.      isosorbide mononitrate (IMDUR) 60 MG 24 hr tablet Take 1 tablet by mouth 2 (Two) Times a Day. 90 tablet 3    lisinopril (PRINIVIL,ZESTRIL) 5 MG tablet Take 1 tablet by mouth once daily 90 tablet 3    metoprolol succinate XL (TOPROL-XL) 25 MG 24 hr tablet Take 0.5 tablets by mouth Daily. 90 tablet 3    nitroglycerin  (NITROSTAT) 0.4 MG SL tablet 1 under the tongue as needed for angina, may repeat q5mins for up three doses 100 tablet 0    pantoprazole (PROTONIX) 40 MG EC tablet Take 1 tablet by mouth Every Morning for 30 days. 30 tablet 0    rosuvastatin (CRESTOR) 40 MG tablet Take 1 tablet by mouth Every Night. 90 tablet 3     No facility-administered medications prior to visit.     No opioid medication identified on active medication list. I have reviewed chart for other potential  high risk medication/s and harmful drug interactions in the elderly.      Aspirin is on active medication list. Aspirin use is indicated based on review of current medical condition/s. Pros and cons of this therapy have been discussed today. Benefits of this medication outweigh potential harm.  Patient has been encouraged to continue taking this medication.  .      Patient Active Problem List   Diagnosis    Benign prostatic hyperplasia with urinary obstruction    Carpal tunnel syndrome, bilateral    Coronary artery disease    Degeneration of intervertebral disc of lumbosacral region    Degenerative arthritis    Gastroesophageal reflux disease with hiatal hernia    Hyperlipidemia    Primary hypertension    Obesity (BMI 30-39.9)    Pulmonary sarcoidosis    Rhinitis medicamentosa    Spinal stenosis, lumbar    Status post hip replacement    Seasonal allergies    Insomnia    Combined forms of age-related cataract of both eyes    Hyperopia of both eyes    Presbyopia    Regular astigmatism of right eye    Vitreous floaters    Chest pain    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  ACP discussion was held with the patient during this visit. Patient does not have an advance directive, information provided.            Objective   Vitals:    04/21/25 1119   BP: 113/68   BP Location: Right arm   Patient Position: Sitting   Cuff Size: Large Adult   Pulse: 62   Resp: 16   Temp: 97.5 °F  "(36.4 °C)   SpO2: 96%   Weight: 110 kg (243 lb 4.8 oz)   Height: 182.9 cm (72\")       Estimated body mass index is 33 kg/m² as calculated from the following:    Height as of this encounter: 182.9 cm (72\").    Weight as of this encounter: 110 kg (243 lb 4.8 oz).                Does the patient have evidence of cognitive impairment? No                                                                                                Health  Risk Assessment    Smoking Status:  Social History     Tobacco Use   Smoking Status Never    Passive exposure: Never   Smokeless Tobacco Never     Alcohol Consumption:  Social History     Substance and Sexual Activity   Alcohol Use No       Fall Risk Screen  STEADI Fall Risk Assessment was completed, and patient is at LOW risk for falls.Assessment completed on:2025    Depression Screening   Little interest or pleasure in doing things? Not at all   Feeling down, depressed, or hopeless? Not at all   PHQ-2 Total Score 0      Health Habits and Functional and Cognitive Screenin/21/2025    11:23 AM   Functional & Cognitive Status   Do you have difficulty preparing food and eating? No   Do you have difficulty bathing yourself, getting dressed or grooming yourself? No   Do you have difficulty using the toilet? No   Do you have difficulty moving around from place to place? No   Do you have trouble with steps or getting out of a bed or a chair? No   Current Diet Well Balanced Diet   Dental Exam Not up to date   Eye Exam Up to date   Exercise (times per week) 5 times per week   Current Exercises Include Walking   Do you need help using the phone?  No   Are you deaf or do you have serious difficulty hearing?  No   Do you need help to go to places out of walking distance? No   Do you need help shopping? No   Do you need help preparing meals?  No   Do you need help with housework?  No   Do you need help with laundry? No   Do you need help taking your medications? No   Do you need help " managing money? No   Do you ever drive or ride in a car without wearing a seat belt? No   Have you felt unusual stress, anger or loneliness in the last month? No   Who do you live with? Spouse   If you need help, do you have trouble finding someone available to you? No   Have you been bothered in the last four weeks by sexual problems? No   Do you have difficulty concentrating, remembering or making decisions? No           Age-appropriate Screening Schedule:  Refer to the list below for future screening recommendations based on patient's age, sex and/or medical conditions. Orders for these recommended tests are listed in the plan section. The patient has been provided with a written plan.    Health Maintenance List  Health Maintenance   Topic Date Due    TDAP/TD VACCINES (1 - Tdap) Never done    ZOSTER VACCINE (1 of 2) Never done    HEPATITIS C SCREENING  Never done    ANNUAL WELLNESS VISIT  Never done    RSV Vaccine - Adults (1 - 1-dose 75+ series) Never done    COVID-19 Vaccine (7 - Pfizer risk 2024-25 season) 03/20/2025    INFLUENZA VACCINE  07/01/2025    LIPID PANEL  10/22/2025    Pneumococcal Vaccine 50+  Completed    COLORECTAL CANCER SCREENING  Discontinued                                                                                                                                                CMS Preventative Services Quick Reference  Risk Factors Identified During Encounter  None Identified    The above risks/problems have been discussed with the patient.  Pertinent information has been shared with the patient in the After Visit Summary.  An After Visit Summary and PPPS were made available to the patient.    Follow Up:   Next Medicare Wellness visit to be scheduled in 1 year.         Additional E&M Note during same encounter follows:  Patient has additional, significant, and separately identifiable condition(s)/problem(s) that require work above and beyond the Medicare Wellness Visit     Chief  Complaint  Medicare Wellness-Initial Visit (6 month f/u )    Subjective   79-year-old white male history of CAD/MI/stents, hypertension, hyperlipidemia, arthritis, spinal stenosis, lumbar DDD, BPH with non-Hodgkin's B cell lymphoma in remission who comes in today for Medicare wellness visit    Blood pressure 112/68 heart rate 62 he denies any chest pain, dyspnea, tachycardia or dizziness.  Patient just had recent ER visit for chest pain which turned out to be his hiatal hernia.  He did have a cardiac cath in January 2025 and has an upcoming visit with cardiology    Patient placed on Protonix per [facility] and if that does not work they plan to do another EGD.  He states this helped a little bit but mainly burning at night.  I suggested he elevate the head of his bed a little bit more and eat a bland snack before going to bed.  We did discuss foods to avoid    Patient is complaining of tinnitus.  He has hearing aids but does not wear them.  I told him that hearing aids cannot help block out the ear ringing and other than allergy medicine is not a lot he can do for that because it usually due to hearing loss and damage to the ears    Patient had blood work done with his recent ER visit with the exception of lipid panel and thyroid.  He is also wanting to have his B12 and vitamin D levels checked    Weight is 243 with a BMI of 33.  He has had 4 COVID vaccines up-to-date on eye exam.  PSA is due November 2025 and next colonoscopy will be up to oncology.  He does not have a living well I did give him information regarding            Fasting blood work  Try wearing hearing aids daily to help with hearing aid  Allergy medication if needed  Keep appointment with cardiology  Try elevating head of the bed a little bit more and eating a bland p.m. snack  Follow-up 6 months              Review of Systems   Constitutional: Negative.    HENT: Negative.  Positive for tinnitus.    Respiratory: Negative.     Cardiovascular: Negative.   "  Gastrointestinal:         GERD   Genitourinary: Negative.    Musculoskeletal: Negative.    Skin: Negative.    Neurological: Negative.    Psychiatric/Behavioral: Negative.                Objective   Vital Signs:  /68 (BP Location: Right arm, Patient Position: Sitting, Cuff Size: Large Adult)   Pulse 62   Temp 97.5 °F (36.4 °C)   Resp 16   Ht 182.9 cm (72\")   Wt 110 kg (243 lb 4.8 oz)   SpO2 96%   BMI 33.00 kg/m²   Physical Exam  Constitutional:       Appearance: Normal appearance.   HENT:      Head: Normocephalic.      Ears:      Comments: Ears ringing  Cardiovascular:      Rate and Rhythm: Normal rate and regular rhythm.      Pulses: Normal pulses.      Heart sounds: Normal heart sounds.   Pulmonary:      Effort: Pulmonary effort is normal.      Breath sounds: Normal breath sounds.   Abdominal:      General: Bowel sounds are normal.      Comments: Patient having GERD mainly at night resembling chest pain   Musculoskeletal:         General: Normal range of motion.   Skin:     General: Skin is warm.   Neurological:      General: No focal deficit present.      Mental Status: He is alert and oriented to person, place, and time.   Psychiatric:         Mood and Affect: Mood normal.         Behavior: Behavior normal.                    Assessment and Plan      Hyperlipidemia, unspecified hyperlipidemia type       Orders:    Lipid Panel With LDL / HDL Ratio; Future    Other fatigue    Orders:    TSH+Free T4; Future    T3; Future    Calcitriol (1,25 di-OH Vitamin D); Future    Vitamin B12; Future    Seasonal allergies         Chest pain, unspecified type         Class 1 obesity due to excess calories with serious comorbidity and body mass index (BMI) of 33.0 to 33.9 in adult  Patient's (Body mass index is 33 kg/m².) indicates that they are obese (BMI >30) with health conditions that include hypertension and dyslipidemias . Weight is unchanged. BMI  is above average; no BMI management plan is appropriate. We " discussed portion control and increasing exercise.          Tinnitus of both ears                 Follow Up   No follow-ups on file.  Patient was given instructions and counseling regarding his condition or for health maintenance advice. Please see specific information pulled into the AVS if appropriate.

## 2025-04-21 NOTE — ASSESSMENT & PLAN NOTE
Patient's (Body mass index is 33 kg/m².) indicates that they are obese (BMI >30) with health conditions that include hypertension and dyslipidemias . Weight is unchanged. BMI  is above average; no BMI management plan is appropriate. We discussed portion control and increasing exercise.

## 2025-04-21 NOTE — PATIENT INSTRUCTIONS
Fasting blood work  Try wearing hearing aids daily to help with hearing aid  Allergy medication if needed  Keep appointment with cardiology  Try elevating head of the bed a little bit more and eating a bland p.m. snack  Follow-up 6 months

## 2025-04-23 NOTE — PROGRESS NOTES
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     History of Present Illness:    2024: In the office completely asymptomatic to transfer his care from \[facility]\"". He first was seen in late 2023 for a screening colonoscopy. Linear furrows throughout the terminal ileum were felt to be abnormal. He also had colonic polyps, diverticulosis and hemorrhoids in the colon. The biopsies confirmed tubular adenomas and hyperplastic polyps. In addition there was an atypical mixed B cell infiltrate. B cell re-arrangement was performed. Monoclonal B cells were confirmed. This was reported to be consistent with extranodal marginal zone lymphoma. He was entirely asymptomatic. Imaging reported no abnormalities in the abdomen. In the mediastinum there were enlarged mediastinal and hilar lymph nodes, which were partially calcified, similar to a previous scan; this included a 2.5 X 1.6 cm right paratracheal node and a 1.9 X 1.3 cm right infrahilar node. In early  he had undergone a biopsy that had reported non-caseating granulomata that was consistent with sarcoidosis. The PET scan reported bilateral hilar and mediastinal adenopathy that were fluorodeoxyglucose avid. Otherwise there were no areas of abnormal tracer accumulation. Bone marrow biopsy was negative. A decision was made then to treat him with rituximab. He received the first of 4 doses on 10/28/2023 and completed a fourth dose on 11/3/2023. He had fever of up to 101.2 after the rituximab injections. A follow up PET scan only a few days later, showed no change. Today he tells me he remains perfectly asymptomatic. He has maintained a stable weight and has been afebrile. He has been without nocturnal diaphoresis. He has  maintained a good appetite and is generally energetic. He has been eating well and denies nausea or vomiting. Has not had any chest pain or cough and has not had diarrhea or dysuria. No edema. On exam no palpable adenopathy. The laboratory exams were reviewed. I'm not sure he needed any specific treatment and at this time he seems to be doing well and is likely to continue to do well without intervention. He is to have scans and laboratory exams and will see me with new scans.     2/27/2024: Entirely asymptomatic.  Afebrile, without unintended weight loss and without nocturnal diaphoresis.  No abdominal pain.  Eating well and without nausea or vomiting.  On exam no palpable adenopathy in the neck, supraclavicular regions, axillary spaces wearing wider spaces.  The abdomen is rounded and soft.  Not tender.  The liver and spleen do not seem enlarged and I cannot palpate any tumors.  The laboratory exams were reviewed.  The blood count is well within the normal range.  Chemistry is also adequate.  To see me again in 4 months.  Consider scans in 6 months.  Consider a colonoscopy sometime in 2025.  Discussed with him at length.    6/25/2024: Again without new symptoms.  As active as usual and without new limitations.  Maintains a good appetite and has had no nausea or vomiting.  No chest pains or cough and no abdominal pain, diarrhea or dysuria.  On exam alert and conversant.  Well-oriented and in no distress.  No jaundice and not pale.  The lungs are clear and the heart regular.  Abdomen is soft.  There is no edema.  Laboratory exams again reveal moderate thrombocytopenia.  Will continue to monitor without intervention at this time.  He is to see me in approximately 4 months but will have a platelet count checked in approximately 2 months.  Discussed with him.    10/29/2024: Feels just as well as at the time of the last visit.  No fevers or nocturnal diaphoresis.  No chest pains or cough and no abdominal pain.  He is  weight has remained stable.  On exam alert and conversant.  No distress.  No jaundice.  No pallor.  Lungs are clear bilaterally and heart is regular.  Abdomen is protuberant but soft.  Liver and spleen not enlarged.  No edema.  Laboratory exams reviewed.  His blood count is well within the normal range.  No intervention at this time.  See me in approximately 6 months.    4/29/2025: Feeling well.  Remains active and generally energetic.  He visited the emergency room earlier this month because of chest pains.  He was not found to have any evidence of an acute coronary syndrome.  He is eating well and his weight has been stable.  He is afebrile and has not experienced any nocturnal diaphoresis.  He has not had any more chest pains.  He denies dysphagia.  No cough or increased expectoration.  As well he has been free of abdominal pain, diarrhea, melena or hematochezia.  On exam well-built and well-oriented.  Conversant and in no distress.  No palpable lymphadenopathy.  Lungs clear bilaterally.  Heart regular.  Abdomen protuberant and soft.  No edema.  Laboratory exams reviewed and discussed with him.  To continue observation.  See me in 6 months.  He is due to have a colonoscopy in 2026.    Past Medical History:   Diagnosis Date    Benign prostatic hyperplasia with urinary obstruction 01/10/2014    Carpal tunnel syndrome, bilateral 01/10/2014    Combined forms of age-related cataract of both eyes 05/24/2022    Coronary artery disease     Degeneration of intervertebral disc of lumbosacral region 01/10/2014    Gastroesophageal reflux disease with hiatal hernia 01/24/2018    GERD (gastroesophageal reflux disease)     Hyperlipidemia     Hypertension     Insomnia 03/04/2020    Myocardial infarction     Obesity (BMI 30-39.9) 10/26/2016    Pulmonary sarcoidosis 01/10/2014    Seasonal allergies 03/04/2020    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 01/27/2025    Status post hip replacement 01/10/2014     Past  Surgical History:   Procedure Laterality Date    CARDIAC CATHETERIZATION  01/2007    TIA: proximal LAD    CARDIAC CATHETERIZATION N/A 07/09/2022    Procedure: Left Heart Cath possible PCI, atherectomy, hemodynamic support;  Surgeon: John Perez MD;  Location: [facility] CATH INVASIVE LOCATION;  Service: Cardiovascular;  Laterality: N/A;    CARDIAC CATHETERIZATION Right 1/19/2025    Procedure: Left Heart Cath;  Surgeon: Maximino Lynn MD;  Location: [facility] CATH INVASIVE LOCATION;  Service: Cardiovascular;  Laterality: Right;    COLONOSCOPY  07/2023    COLONOSCOPY N/A 06/27/2023    Procedure: COLONOSCOPY with terminal ileum biopsy's and cold snare polypectomy x 10;  Surgeon: Neymar Watson MD;  Location: [facility] ENDOSCOPY;  Service: Gastroenterology;  Laterality: N/A;  diverticulosis, internal hemorrhoids    HERNIA REPAIR      TOTAL HIP ARTHROPLASTY Left        Current Outpatient Medications:     amLODIPine (NORVASC) 5 MG tablet, Take 1 tablet by mouth Daily. (Patient taking differently: Take 1 tablet by mouth Every Night.), Disp: 90 tablet, Rfl: 3    aspirin 81 MG EC tablet, Take 1 tablet by mouth Daily. Indications: Stable Angina Pectoris, Disp: , Rfl:     clopidogrel (PLAVIX) 75 MG tablet, Take 1 tablet by mouth once daily, Disp: 90 tablet, Rfl: 3    diphenhydrAMINE-acetaminophen (TYLENOL PM)  MG tablet per tablet, Take 2 tablets by mouth At Night As Needed., Disp: , Rfl:     ezetimibe (ZETIA) 10 MG tablet, Take 1 tablet by mouth Daily., Disp: 90 tablet, Rfl: 3    fluticasone (FLONASE) 50 MCG/ACT nasal spray, Administer 2 sprays into the nostril(s) as directed by provider Daily As Needed for Rhinitis., Disp: , Rfl:     isosorbide mononitrate (IMDUR) 60 MG 24 hr tablet, Take 1 tablet by mouth 2 (Two) Times a Day., Disp: 90 tablet, Rfl: 3    lisinopril (PRINIVIL,ZESTRIL) 5 MG tablet, Take 1 tablet by mouth once daily, Disp: 90 tablet, Rfl: 3    metoprolol succinate XL (TOPROL-XL) 25 MG 24 hr tablet, Take 0.5  tablets by mouth Daily., Disp: 90 tablet, Rfl: 3    pantoprazole (PROTONIX) 40 MG EC tablet, Take 1 tablet by mouth Every Morning for 30 days., Disp: 30 tablet, Rfl: 0    rosuvastatin (CRESTOR) 40 MG tablet, Take 1 tablet by mouth Every Night., Disp: 90 tablet, Rfl: 3    nitroglycerin (NITROSTAT) 0.4 MG SL tablet, 1 under the tongue as needed for angina, may repeat q5mins for up three doses (Patient not taking: Reported on 4/29/2025), Disp: 100 tablet, Rfl: 0    Allergies   Allergen Reactions    Adhesive Tape Itching     Family History   Problem Relation Age of Onset    Parkinsonism Mother     Heart disease Father     Valvular heart disease Father     Pancreatic cancer Sister 85    Dementia Sister     Dementia Sister      Cancer-related family history includes Pancreatic cancer (age of onset: 85) in his sister.    Social History     Tobacco Use    Smoking status: Never     Passive exposure: Never    Smokeless tobacco: Never   Vaping Use    Vaping status: Never Used   Substance Use Topics    Alcohol use: No    Drug use: No     Social History     Social History Narrative    Not on file     ROS:   Review of Systems   Constitutional:  Negative for activity change, appetite change, chills, diaphoresis, fatigue, fever and unexpected weight change.   HENT:  Negative for congestion, dental problem, drooling, ear discharge, ear pain, facial swelling, hearing loss, mouth sores, nosebleeds, postnasal drip, rhinorrhea, sinus pressure, sinus pain, sneezing, sore throat, tinnitus, trouble swallowing and voice change.    Eyes:  Negative for photophobia, pain, discharge, redness, itching and visual disturbance.   Respiratory:  Negative for apnea, cough, choking, chest tightness, shortness of breath, wheezing and stridor.    Cardiovascular:  Negative for chest pain, palpitations and leg swelling.   Gastrointestinal:  Negative for abdominal distention, abdominal pain, anal bleeding, blood in stool, constipation, diarrhea, nausea,  "rectal pain and vomiting.   Endocrine: Negative for cold intolerance, heat intolerance, polydipsia and polyuria.   Genitourinary:  Negative for decreased urine volume, difficulty urinating, dysuria, flank pain, frequency, genital sores, hematuria and urgency.   Musculoskeletal:  Negative for arthralgias, back pain, gait problem, joint swelling, myalgias, neck pain and neck stiffness.   Skin:  Negative for color change, pallor and rash.   Neurological:  Negative for dizziness, tremors, seizures, syncope, facial asymmetry, speech difficulty, weakness, light-headedness, numbness and headaches.   Hematological:  Negative for adenopathy. Does not bruise/bleed easily.   Psychiatric/Behavioral:  Negative for agitation, behavioral problems, confusion, decreased concentration, hallucinations, self-injury, sleep disturbance and suicidal ideas. The patient is not nervous/anxious.      Objective:    Vital Signs:  Vitals:    04/29/25 1055   Pulse: 64   Resp: 14   Temp: 98.6 °F (37 °C)   SpO2: 99%   Weight: 110 kg (243 lb)   Height: 182.9 cm (72\")   PainSc: 0-No pain     Body mass index is 32.96 kg/m².    ECOG  (0) Fully active, able to carry on all predisease performance without restriction    Physical Exam:   Physical Exam  Constitutional:       General: He is not in acute distress.     Appearance: He is not ill-appearing, toxic-appearing or diaphoretic.   HENT:      Head: Normocephalic and atraumatic.      Right Ear: External ear normal.      Left Ear: External ear normal.      Nose: Nose normal.      Mouth/Throat:      Mouth: Mucous membranes are moist.      Pharynx: Oropharynx is clear.   Eyes:      General: No scleral icterus.        Right eye: No discharge.         Left eye: No discharge.      Conjunctiva/sclera: Conjunctivae normal.      Pupils: Pupils are equal, round, and reactive to light.   Cardiovascular:      Rate and Rhythm: Normal rate and regular rhythm.      Pulses: Normal pulses.      Heart sounds: Normal heart " sounds. No murmur heard.     No friction rub. No gallop.   Pulmonary:      Effort: No respiratory distress.      Breath sounds: No stridor. No wheezing, rhonchi or rales.   Chest:      Chest wall: No tenderness.   Abdominal:      General: Abdomen is flat. Bowel sounds are normal. There is no distension.      Palpations: Abdomen is soft. There is no mass.      Tenderness: There is no abdominal tenderness. There is no right CVA tenderness, left CVA tenderness, guarding or rebound.   Musculoskeletal:         General: No swelling, tenderness, deformity or signs of injury.      Cervical back: No rigidity.      Right lower leg: No edema.      Left lower leg: No edema.   Lymphadenopathy:      Cervical: No cervical adenopathy.   Skin:     General: Skin is warm and dry.      Coloration: Skin is not jaundiced.      Findings: No bruising or rash.   Neurological:      General: No focal deficit present.      Mental Status: He is alert and oriented to person, place, and time.      Gait: Gait normal.   Psychiatric:         Mood and Affect: Mood normal.         Behavior: Behavior normal.         Thought Content: Thought content normal.         Judgment: Judgment normal.     MESHA Mcgarry MD performed the physical exam on 4/29/2025 as documented above.    Lab Results - Last 18 Months   Lab Units 04/16/25  0842 01/19/25  0143 01/18/25  1125   WBC 10*3/mm3 8.31 6.53 5.65   HEMOGLOBIN g/dL 15.9 14.6 15.2   HEMATOCRIT % 48.6 43.9 45.8   PLATELETS 10*3/mm3 162 158 167   MCV fL 88.4 88.5 88.1     Lab Results   Component Value Date    GLUCOSE 121 (H) 04/16/2025    BUN 15 04/16/2025    CREATININE 1.00 04/16/2025    EGFRIFNONA 67 02/01/2021    EGFRIFAFRI 77 02/01/2021    BCR 15.0 04/16/2025    K 4.0 04/16/2025    CO2 21.4 (L) 04/16/2025    CALCIUM 9.0 04/16/2025    ALBUMIN 4.1 04/16/2025    AST 30 04/16/2025    ALT 22 04/16/2025     Lab Results   Component Value Date    PTT 28.3 10/12/2023    INR 1.15 (H) 10/12/2023     Lab Results    Component Value Date    PSA 0.2 10/22/2024     Assessment & Plan     Extranodal marginal zone B cell lymphoma: Without any suggestion of recurring disease.  Discussed with him.  Thrombocytopenia: Resolved.  Laboratory exams reviewed and discussed with him.  Coronary artery disease  Congestive heart failure.   Reviewed the records from the recent visit emergency room.  Reviewed all laboratory exams including blood counts, chemistries, thyroid function and cardiac enzymes.  Discussed with him.  He will return to see me in approximately 6 months with new laboratory exams.    Bright Mcgarry MD on 4/29/2025 at 11:19 AM.

## 2025-04-24 ENCOUNTER — OFFICE (OUTPATIENT)
Dept: URBAN - METROPOLITAN AREA CLINIC 64 | Facility: CLINIC | Age: 80
End: 2025-04-24
Payer: MEDICARE

## 2025-04-24 VITALS
HEART RATE: 51 BPM | HEIGHT: 72 IN | DIASTOLIC BLOOD PRESSURE: 73 MMHG | SYSTOLIC BLOOD PRESSURE: 128 MMHG | WEIGHT: 260 LBS

## 2025-04-24 DIAGNOSIS — K21.9 GASTRO-ESOPHAGEAL REFLUX DISEASE WITHOUT ESOPHAGITIS: ICD-10-CM

## 2025-04-24 PROCEDURE — 99214 OFFICE O/P EST MOD 30 MIN: CPT | Performed by: NURSE PRACTITIONER

## 2025-04-24 RX ORDER — PANTOPRAZOLE 40 MG/1
TABLET, DELAYED RELEASE ORAL
Qty: 90 | Refills: 3 | Status: ACTIVE

## 2025-04-29 ENCOUNTER — OFFICE VISIT (OUTPATIENT)
Dept: ONCOLOGY | Facility: CLINIC | Age: 80
End: 2025-04-29
Payer: MEDICARE

## 2025-04-29 VITALS
WEIGHT: 243 LBS | RESPIRATION RATE: 14 BRPM | HEIGHT: 72 IN | TEMPERATURE: 98.6 F | HEART RATE: 64 BPM | BODY MASS INDEX: 32.91 KG/M2 | OXYGEN SATURATION: 99 %

## 2025-04-29 DIAGNOSIS — C85.10 B-CELL LYMPHOMA, UNSPECIFIED B-CELL LYMPHOMA TYPE, UNSPECIFIED BODY REGION: Primary | ICD-10-CM

## 2025-04-29 PROCEDURE — 1126F AMNT PAIN NOTED NONE PRSNT: CPT | Performed by: INTERNAL MEDICINE

## 2025-04-29 PROCEDURE — 1160F RVW MEDS BY RX/DR IN RCRD: CPT | Performed by: INTERNAL MEDICINE

## 2025-04-29 PROCEDURE — 1159F MED LIST DOCD IN RCRD: CPT | Performed by: INTERNAL MEDICINE

## 2025-04-29 PROCEDURE — 99213 OFFICE O/P EST LOW 20 MIN: CPT | Performed by: INTERNAL MEDICINE

## 2025-05-01 RX ORDER — CLOPIDOGREL BISULFATE 75 MG/1
75 TABLET ORAL DAILY
Qty: 90 TABLET | Refills: 3 | Status: SHIPPED | OUTPATIENT
Start: 2025-05-01

## 2025-05-01 NOTE — TELEPHONE ENCOUNTER
Rx Refill Note  Requested Prescriptions     Pending Prescriptions Disp Refills    clopidogrel (PLAVIX) 75 MG tablet [Pharmacy Med Name: Clopidogrel Bisulfate 75 MG Oral Tablet] 90 tablet 0     Sig: Take 1 tablet by mouth once daily      Last office visit with prescribing clinician: Visit date not found   Last telemedicine visit with prescribing clinician: Visit date not found   Next office visit with prescribing clinician: Visit date not found                         Would you like a call back once the refill request has been completed: [] Yes [] No    If the office needs to give you a call back, can they leave a voicemail: [] Yes [] No    Nimco Alexander MA  05/01/25, 08:51 EDT

## 2025-05-14 RX ORDER — PREDNISONE 10 MG/1
TABLET ORAL
Qty: 19 TABLET | Refills: 0 | Status: SHIPPED | OUTPATIENT
Start: 2025-05-14 | End: 2025-05-26

## 2025-05-17 NOTE — PROGRESS NOTES
Encounter Date:05/28/2025        Patient ID: Donald Navarro is a 79 y.o. male.      Chief Complaint:      History of Present Illness  Donald Navarro is a 79 y.o. male  with history of CAD status post PCI to the LAD, hypertension, hyperlipidemia.   January 2025 presentation to the hospital with unstable angina where cardiac catheterization showed patent stent in the LAD.  Nonobstructive disease in the other vessels.  He gets coronary spasms which wake him from sleep.  He has had 2 more episodes since last cardiac catheterization.  Symptoms resolved after taking nitroglycerin.     Current cardiac medications include amlodipine, aspirin, Plavix, Zetia, lisinopril, Imdur, Toprol-XL and Crestor       The following portions of the patient's history were reviewed and updated as appropriate: allergies, current medications, past family history, past medical history, past social history, past surgical history, and problem list.    Review of Systems   Constitutional: Negative for malaise/fatigue.   Cardiovascular:  Negative for chest pain, dyspnea on exertion, leg swelling and palpitations.   Respiratory:  Negative for cough and shortness of breath.    Gastrointestinal:  Negative for abdominal pain, nausea and vomiting.   Neurological:  Negative for dizziness, headaches, light-headedness, numbness and weakness.   All other systems reviewed and are negative.        Current Outpatient Medications:     amLODIPine (NORVASC) 5 MG tablet, Take 1 tablet by mouth Daily. (Patient taking differently: Take 1 tablet by mouth Every Night.), Disp: 90 tablet, Rfl: 3    aspirin 81 MG EC tablet, Take 1 tablet by mouth Daily. Indications: Stable Angina Pectoris, Disp: , Rfl:     clopidogrel (PLAVIX) 75 MG tablet, Take 1 tablet by mouth once daily, Disp: 90 tablet, Rfl: 3    diphenhydrAMINE-acetaminophen (TYLENOL PM)  MG tablet per tablet, Take 2 tablets by mouth At Night As Needed., Disp: , Rfl:     ezetimibe (ZETIA) 10 MG tablet, Take 1  tablet by mouth Daily., Disp: 90 tablet, Rfl: 3    famotidine (PEPCID) 40 MG tablet, Take 1 tablet by mouth Every 12 (Twelve) Hours., Disp: , Rfl:     fluticasone (FLONASE) 50 MCG/ACT nasal spray, Administer 2 sprays into the nostril(s) as directed by provider Daily As Needed for Rhinitis., Disp: , Rfl:     isosorbide mononitrate (IMDUR) 60 MG 24 hr tablet, Take 1 tablet by mouth 2 (Two) Times a Day., Disp: 90 tablet, Rfl: 3    metoprolol succinate XL (TOPROL-XL) 25 MG 24 hr tablet, Take 0.5 tablets by mouth Daily., Disp: 90 tablet, Rfl: 3    pantoprazole (PROTONIX) 40 MG EC tablet, Take 1 tablet by mouth Every Morning., Disp: , Rfl:     rosuvastatin (CRESTOR) 40 MG tablet, Take 1 tablet by mouth Every Night., Disp: 90 tablet, Rfl: 3    levothyroxine (Synthroid) 25 MCG tablet, Take 1 tablet by mouth Every Morning., Disp: 90 tablet, Rfl: 0    lisinopril (PRINIVIL,ZESTRIL) 5 MG tablet, Take 1 tablet by mouth once daily (Patient not taking: Reported on 5/28/2025), Disp: 90 tablet, Rfl: 3    nitroglycerin (NITROSTAT) 0.4 MG SL tablet, 1 under the tongue as needed for angina, may repeat q5mins for up three doses (Patient not taking: Reported on 5/28/2025), Disp: 100 tablet, Rfl: 0    Current outpatient and discharge medications have been reconciled for the patient.  Reviewed by: Maximino Lynn MD       Allergies   Allergen Reactions    Adhesive Tape Itching       Family History   Problem Relation Age of Onset    Parkinsonism Mother     Heart disease Father     Valvular heart disease Father     Pancreatic cancer Sister 85    Dementia Sister     Dementia Sister        Past Surgical History:   Procedure Laterality Date    CARDIAC CATHETERIZATION  01/2007    TIA: proximal LAD    CARDIAC CATHETERIZATION N/A 07/09/2022    Procedure: Left Heart Cath possible PCI, atherectomy, hemodynamic support;  Surgeon: John Perez MD;  Location: [facility] CATH INVASIVE LOCATION;  Service: Cardiovascular;  Laterality: N/A;    CARDIAC  "CATHETERIZATION Right 1/19/2025    Procedure: Left Heart Cath;  Surgeon: Maxiimno Lynn MD;  Location: [facility] CATH INVASIVE LOCATION;  Service: Cardiovascular;  Laterality: Right;    COLONOSCOPY  07/2023    COLONOSCOPY N/A 06/27/2023    Procedure: COLONOSCOPY with terminal ileum biopsy's and cold snare polypectomy x 10;  Surgeon: Neymar Watson MD;  Location: [facility] ENDOSCOPY;  Service: Gastroenterology;  Laterality: N/A;  diverticulosis, internal hemorrhoids    HERNIA REPAIR      TOTAL HIP ARTHROPLASTY Left        Past Medical History:   Diagnosis Date    Benign prostatic hyperplasia with urinary obstruction 01/10/2014    Carpal tunnel syndrome, bilateral 01/10/2014    Combined forms of age-related cataract of both eyes 05/24/2022    Coronary artery disease     Degeneration of intervertebral disc of lumbosacral region 01/10/2014    Gastroesophageal reflux disease with hiatal hernia 01/24/2018    GERD (gastroesophageal reflux disease)     Hyperlipidemia     Hypertension     Insomnia 03/04/2020    Myocardial infarction     Obesity (BMI 30-39.9) 10/26/2016    Pulmonary sarcoidosis 01/10/2014    Seasonal allergies 03/04/2020    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 01/27/2025    Status post hip replacement 01/10/2014       Family History   Problem Relation Age of Onset    Parkinsonism Mother     Heart disease Father     Valvular heart disease Father     Pancreatic cancer Sister 85    Dementia Sister     Dementia Sister        Social History     Socioeconomic History    Marital status:    Tobacco Use    Smoking status: Never     Passive exposure: Never    Smokeless tobacco: Never   Vaping Use    Vaping status: Never Used   Substance and Sexual Activity    Alcohol use: No    Drug use: No    Sexual activity: Defer               Objective:       Physical Exam    /77 (BP Location: Left arm, Patient Position: Sitting, Cuff Size: Adult)   Pulse 60   Ht 182.9 cm (72\")   Wt 110 kg (243 lb 3.2 " oz)   SpO2 98%   BMI 32.98 kg/m²   The patient is alert, oriented and in no distress.    Vital signs as noted above.    Head and neck revealed no carotid bruits or jugular venous distension.  No thyromegaly or lymphadenopathy is present.    Lungs clear.  No wheezing.  Breath sounds are normal bilaterally.    Heart normal first and second heart sounds.  No murmur..  No pericardial rub is present.  No gallop is present.    Abdomen soft and nontender.  No organomegaly is present.    Extremities revealed good peripheral pulses without any pedal edema.    Skin warm and dry.    Musculoskeletal system is grossly normal.    CNS grossly normal.           Diagnosis Plan   1. Coronary artery disease involving native coronary artery of native heart with angina pectoris with documented spasm        2. Unstable angina        3. Hyperlipidemia, unspecified hyperlipidemia type        4. Primary hypertension        5. Obesity (BMI 30-39.9)        6. Coronary artery disease involving native coronary artery of native heart without angina pectoris [I25.10]        7. Mixed hyperlipidemia        8. Dyspnea on exertion        9. Gastroesophageal reflux disease with hiatal hernia        10. Lymphoma, unspecified body region, unspecified lymphoma type        LAB RESULTS (LAST 7 DAYS)    CBC        BMP        CMP         BNP        TROPONIN        CoAg        Creatinine Clearance  CrCl cannot be calculated (Patient's most recent lab result is older than the maximum 30 days allowed.).    ABG        Radiology  No radiology results for the last day    EKG  Procedures    Stress test  Results for orders placed during the hospital encounter of 03/04/20    Stress Test With Myocardial Perfusion One Day    Interpretation Summary  · Findings consistent with a normal ECG stress test.  · Left ventricular ejection fraction is normal (Calculated EF = 59%).  · Impressions are consistent with a low risk study.  · Small fixed distal inferolateral defect  without any ischemia EF 60%.    No changes in EKG with lexiscan.  Correlation with myocardial perfusion images recommended      Echocardiogram      Cardiac catheterization  Results for orders placed during the hospital encounter of 01/18/25    Cardiac Catheterization/Vascular Study    Conclusion  OPERATORS:  1. Maximino Lynn M.D., Attending Cardiologist      PROCEDURE PERFORMED.  Ultrasound guided vascular access  Left heart catheterization  Coronary Angiogram  Moderate Sedation    INDICATIONS FOR PROCEDURE.  79 years old man with multiple cardiovascular risk factors presented to the hospital with chest pain that woke him up from sleep.  Signs and symptoms were thought to be consistent with unstable angina.  After discussing the risk and benefits of the procedure he was brought to the cardiac Cath Lab for definitive coronary angiography.    PROCEDURE IN DETAIL.  Informed consent was obtained from the patient after explaining the risks, benefits, and alternative options of the procedure. After obtaining informed consent, the patient was brought to the cath lab and was prepped in a sterile fashion. Lidocaine 1% was used for local anesthesia into the right radial access site. The right radial artery was accessed under direct ultrasound visualization  with an angiocath needle via modified Seldinger technique. A 6F slender sheath was inserted successfully. Afterwards, 6F JR4  was advanced over a wire into the ascending aorta and used to cross the AV and obtain LV pressures.  AV gradient obtained via pullback technique. JR4 and JL3.5 diagnostic catheters were used to engage the ostia of the RCA and LM respectively. Images of the right and left coronary systems were obtained. All the catheters were exchanged over a wire and subsequently removed. The patient tolerated the procedure well without any complications. The pictures were reviewed at the end of the procedure. TR band applied to right wrist for hemostasis and  inflated with 15 cc of air. No complications were encountered.    HEMODYNAMICS.  LV: 91/6, 9 mmHg  AO: 91/64, 76 mmHg  No significant gradient across the aortic valve during pullback of JR4 catheter.  LV gram was not performed due to recent echocardiogram.    FINDINGS.    Coronary Angiogram.    1. Left main. Left main is a large-caliber vessel which gives rise to the Left Anterior Descending and the Left circumflex.  Left main is angiographically free from any significant disease    2. Left Anterior Descending Artery. LAD is a large vessel which gives rise to several septal perforators and several diagonal branches.  LAD has a patent stent in mid segment.  Jailed diagonal has 30% ostial stenosis.    3. Left Circumflex. The LCx is a medium caliber which gives rise to marginals.  Mid left circumflex has hazy 30% stenosis.    4. Right Coronary Artery. The RCA is a dominant vessel which gives rise to several small caliber branches including PDA and PLV.  RCA has diffuse luminal irregularities.  Sluggish flow throughout the vessel.    IMPRESSIONS.  1. Non-obstructive CAD with patent LAD stent  2.  Normal LVEDP    RECOMMENDATIONS.  1.  Uptitrate antianginal medications for microvascular disease.  2.  Evaluate for noncardiac/GI causes of chest pain  3.  Continue risk factors modification    Electronically signed by Maximino Lynn MD, 01/19/25, 10:30 AM EST.          Assessment and Plan       Diagnoses and all orders for this visit:    1. Coronary artery disease involving native coronary artery of native heart with angina pectoris with documented spasm (Primary)    2. Unstable angina    3. Hyperlipidemia, unspecified hyperlipidemia type    4. Primary hypertension    5. Obesity (BMI 30-39.9)    6. Coronary artery disease involving native coronary artery of native heart without angina pectoris [I25.10]    7. Mixed hyperlipidemia    8. Dyspnea on exertion    9. Gastroesophageal reflux disease with hiatal hernia    10. Lymphoma,  unspecified body region, unspecified lymphoma type         Chest pain, angina  Coronary artery disease  Previous PCI to the LAD with slow flow in the LAD  ECG with normal acute ischemia.  High-sensitivity troponin negative x 2  Continue amlodipine, Imdur and metoprolol  Continue dual antiplatelet therapy and high intensity statin  Recent cardiac catheterization in January 2025 shows patent stent and normal LVEDP  PPI for possible GERD symptoms.  He believes he gets spasms of coronary arteries.  Reassurance provided to the patient.     Hypertension  Heart rate and blood pressure well-controlled  Continue amlodipine, lisinopril, Imdur and metoprolol     Hyperlipidemia  Continue Crestor and Zetia  LDL 55, HDL 30, triglyceride 139 and total cholesterol 110     Lymphoma  In remission     Dyspnea on exertion  Echocardiogram shows preserved LV function, grade 1 diastolic dysfunction and no significant valvular abnormalities.  RVSP 38 mmHg.  LVEDP during cardiac catheterization is normal     Obesity  BMI is 33.  He weighs 243 pounds.  Lifestyle modifications recommended.  Screening and treatment for sleep apnea recommended.

## 2025-05-28 ENCOUNTER — OFFICE VISIT (OUTPATIENT)
Dept: CARDIOLOGY | Facility: CLINIC | Age: 80
End: 2025-05-28
Payer: MEDICARE

## 2025-05-28 VITALS
OXYGEN SATURATION: 98 % | HEIGHT: 72 IN | DIASTOLIC BLOOD PRESSURE: 77 MMHG | SYSTOLIC BLOOD PRESSURE: 121 MMHG | HEART RATE: 60 BPM | WEIGHT: 243.2 LBS | BODY MASS INDEX: 32.94 KG/M2

## 2025-05-28 DIAGNOSIS — C85.90 LYMPHOMA, UNSPECIFIED BODY REGION, UNSPECIFIED LYMPHOMA TYPE: ICD-10-CM

## 2025-05-28 DIAGNOSIS — I25.10 CORONARY ARTERY DISEASE INVOLVING NATIVE CORONARY ARTERY OF NATIVE HEART WITHOUT ANGINA PECTORIS: ICD-10-CM

## 2025-05-28 DIAGNOSIS — I25.111 CORONARY ARTERY DISEASE INVOLVING NATIVE CORONARY ARTERY OF NATIVE HEART WITH ANGINA PECTORIS WITH DOCUMENTED SPASM: Primary | ICD-10-CM

## 2025-05-28 DIAGNOSIS — I20.0 UNSTABLE ANGINA: ICD-10-CM

## 2025-05-28 DIAGNOSIS — E66.9 OBESITY (BMI 30-39.9): ICD-10-CM

## 2025-05-28 DIAGNOSIS — K44.9 GASTROESOPHAGEAL REFLUX DISEASE WITH HIATAL HERNIA: ICD-10-CM

## 2025-05-28 DIAGNOSIS — K21.9 GASTROESOPHAGEAL REFLUX DISEASE WITH HIATAL HERNIA: ICD-10-CM

## 2025-05-28 DIAGNOSIS — I10 PRIMARY HYPERTENSION: ICD-10-CM

## 2025-05-28 DIAGNOSIS — E78.5 HYPERLIPIDEMIA, UNSPECIFIED HYPERLIPIDEMIA TYPE: ICD-10-CM

## 2025-05-28 DIAGNOSIS — R06.09 DYSPNEA ON EXERTION: ICD-10-CM

## 2025-05-28 DIAGNOSIS — E78.2 MIXED HYPERLIPIDEMIA: ICD-10-CM

## 2025-05-28 RX ORDER — PANTOPRAZOLE SODIUM 40 MG/1
40 TABLET, DELAYED RELEASE ORAL EVERY MORNING
COMMUNITY
Start: 2025-05-14

## 2025-05-28 RX ORDER — FAMOTIDINE 40 MG/1
1 TABLET, FILM COATED ORAL EVERY 12 HOURS SCHEDULED
COMMUNITY
Start: 2025-05-09

## 2025-06-17 ENCOUNTER — TELEPHONE (OUTPATIENT)
Dept: CARDIOLOGY | Facility: CLINIC | Age: 80
End: 2025-06-17
Payer: MEDICARE

## 2025-06-17 RX ORDER — METOPROLOL SUCCINATE 25 MG/1
12.5 TABLET, EXTENDED RELEASE ORAL DAILY
Qty: 90 TABLET | Refills: 3 | Status: SHIPPED | OUTPATIENT
Start: 2025-06-17

## 2025-06-17 NOTE — TELEPHONE ENCOUNTER
Rx Refill Note  Requested Prescriptions     Pending Prescriptions Disp Refills    metoprolol succinate XL (TOPROL-XL) 25 MG 24 hr tablet 90 tablet 3     Sig: Take 0.5 tablets by mouth Daily.      Last office visit with prescribing clinician: 5/28/2025   Next office visit with prescribing clinician: Visit date not found                           Delmy Ellis MA  06/17/25, 10:44 EDT

## 2025-06-17 NOTE — TELEPHONE ENCOUNTER
Incoming Refill Request      Medication requested (name and dose): Metoprolol 25 mg    Pharmacy where request should be sent:   [facility] [address] [place] [address] [place]  [id number] [facility]       Additional details provided by patient:     Best call back number: [phone]    Does the patient have less than a 3 day supply:  [x] Yes  [] No    Vadim Lutz Rep  06/17/25, 09:48 EDT

## 2025-06-23 RX ORDER — LISINOPRIL 5 MG/1
5 TABLET ORAL DAILY
Qty: 90 TABLET | Refills: 0 | Status: SHIPPED | OUTPATIENT
Start: 2025-06-23

## 2025-06-23 NOTE — TELEPHONE ENCOUNTER
Rx Refill Note  Requested Prescriptions     Pending Prescriptions Disp Refills    lisinopril (PRINIVIL,ZESTRIL) 5 MG tablet [Pharmacy Med Name: Lisinopril 5 MG Oral Tablet] 90 tablet 0     Sig: Take 1 tablet by mouth once daily      Last office visit with prescribing clinician: Visit date not found   Last telemedicine visit with prescribing clinician: Visit date not found   Next office visit with prescribing clinician: Visit date not found                         Would you like a call back once the refill request has been completed: [] Yes [] No    If the office needs to give you a call back, can they leave a voicemail: [] Yes [] No    Nimco Alexander MA  06/23/25, 08:31 EDT

## 2025-06-26 ENCOUNTER — OFFICE (OUTPATIENT)
Dept: URBAN - METROPOLITAN AREA CLINIC 64 | Facility: CLINIC | Age: 80
End: 2025-06-26
Payer: MEDICARE

## 2025-06-26 VITALS
WEIGHT: 243 LBS | SYSTOLIC BLOOD PRESSURE: 109 MMHG | DIASTOLIC BLOOD PRESSURE: 60 MMHG | HEIGHT: 72 IN | HEART RATE: 59 BPM

## 2025-06-26 DIAGNOSIS — K21.9 GASTRO-ESOPHAGEAL REFLUX DISEASE WITHOUT ESOPHAGITIS: ICD-10-CM

## 2025-06-26 PROCEDURE — 99213 OFFICE O/P EST LOW 20 MIN: CPT | Performed by: NURSE PRACTITIONER

## 2025-06-26 RX ORDER — FAMOTIDINE 40 MG/1
TABLET, FILM COATED ORAL
Qty: 90 | Refills: 3 | Status: ACTIVE

## 2025-07-12 DIAGNOSIS — I20.0 UNSTABLE ANGINA: ICD-10-CM

## 2025-07-12 DIAGNOSIS — I25.111 CORONARY ARTERY DISEASE INVOLVING NATIVE CORONARY ARTERY OF NATIVE HEART WITH ANGINA PECTORIS WITH DOCUMENTED SPASM: Chronic | ICD-10-CM

## 2025-07-14 RX ORDER — ISOSORBIDE MONONITRATE 60 MG/1
60 TABLET, EXTENDED RELEASE ORAL 2 TIMES DAILY
Qty: 90 TABLET | Refills: 0 | Status: SHIPPED | OUTPATIENT
Start: 2025-07-14

## 2025-07-14 NOTE — TELEPHONE ENCOUNTER
Rx Refill Note  Requested Prescriptions     Pending Prescriptions Disp Refills    isosorbide mononitrate (IMDUR) 60 MG 24 hr tablet [Pharmacy Med Name: Isosorbide Mononitrate ER 60 MG Oral Tablet Extended Release 24 Hour] 90 tablet 0     Sig: Take 1 tablet by mouth twice daily      Last office visit with prescribing clinician: 1/13/2025   Next office visit with prescribing clinician: Visit date not found                             Delmy Ellis MA  07/14/25, 08:40 EDT

## 2025-08-25 ENCOUNTER — OFFICE VISIT (OUTPATIENT)
Dept: FAMILY MEDICINE CLINIC | Facility: CLINIC | Age: 80
End: 2025-08-25
Payer: MEDICARE

## 2025-08-25 VITALS
DIASTOLIC BLOOD PRESSURE: 78 MMHG | SYSTOLIC BLOOD PRESSURE: 130 MMHG | HEART RATE: 61 BPM | WEIGHT: 245.4 LBS | HEIGHT: 72 IN | RESPIRATION RATE: 16 BRPM | OXYGEN SATURATION: 97 % | TEMPERATURE: 97.1 F | BODY MASS INDEX: 33.24 KG/M2

## 2025-08-25 DIAGNOSIS — E66.09 CLASS 1 OBESITY DUE TO EXCESS CALORIES WITH SERIOUS COMORBIDITY AND BODY MASS INDEX (BMI) OF 33.0 TO 33.9 IN ADULT: ICD-10-CM

## 2025-08-25 DIAGNOSIS — E78.5 HYPERLIPIDEMIA, UNSPECIFIED HYPERLIPIDEMIA TYPE: Primary | Chronic | ICD-10-CM

## 2025-08-25 DIAGNOSIS — E03.9 ACQUIRED HYPOTHYROIDISM: ICD-10-CM

## 2025-08-25 DIAGNOSIS — Z12.5 SCREENING PSA (PROSTATE SPECIFIC ANTIGEN): ICD-10-CM

## 2025-08-25 DIAGNOSIS — Z00.00 PREVENTATIVE HEALTH CARE: ICD-10-CM

## 2025-08-25 DIAGNOSIS — R73.09 ELEVATED GLUCOSE: ICD-10-CM

## 2025-08-25 DIAGNOSIS — E66.811 CLASS 1 OBESITY DUE TO EXCESS CALORIES WITH SERIOUS COMORBIDITY AND BODY MASS INDEX (BMI) OF 33.0 TO 33.9 IN ADULT: ICD-10-CM

## 2025-08-25 PROCEDURE — 1126F AMNT PAIN NOTED NONE PRSNT: CPT | Performed by: NURSE PRACTITIONER

## 2025-08-25 PROCEDURE — 3078F DIAST BP <80 MM HG: CPT | Performed by: NURSE PRACTITIONER

## 2025-08-25 PROCEDURE — 99213 OFFICE O/P EST LOW 20 MIN: CPT | Performed by: NURSE PRACTITIONER

## 2025-08-25 PROCEDURE — G2211 COMPLEX E/M VISIT ADD ON: HCPCS | Performed by: NURSE PRACTITIONER

## 2025-08-25 PROCEDURE — 3075F SYST BP GE 130 - 139MM HG: CPT | Performed by: NURSE PRACTITIONER

## 2025-08-25 RX ORDER — LEVOTHYROXINE SODIUM 25 UG/1
25 TABLET ORAL
Qty: 90 TABLET | Refills: 0 | Status: SHIPPED | OUTPATIENT
Start: 2025-08-25 | End: 2025-08-27

## 2025-08-26 DIAGNOSIS — I20.0 UNSTABLE ANGINA: ICD-10-CM

## 2025-08-26 DIAGNOSIS — I25.111 CORONARY ARTERY DISEASE INVOLVING NATIVE CORONARY ARTERY OF NATIVE HEART WITH ANGINA PECTORIS WITH DOCUMENTED SPASM: Chronic | ICD-10-CM

## 2025-08-26 DIAGNOSIS — I10 PRIMARY HYPERTENSION: ICD-10-CM

## 2025-08-26 LAB
ALBUMIN SERPL-MCNC: 4 G/DL (ref 3.8–4.8)
ALP SERPL-CCNC: 72 IU/L (ref 44–121)
ALT SERPL-CCNC: 21 IU/L (ref 0–44)
AST SERPL-CCNC: 31 IU/L (ref 0–40)
BASOPHILS # BLD AUTO: 0.1 X10E3/UL (ref 0–0.2)
BASOPHILS NFR BLD AUTO: 1 %
BILIRUB SERPL-MCNC: 0.6 MG/DL (ref 0–1.2)
BUN SERPL-MCNC: 14 MG/DL (ref 8–27)
BUN/CREAT SERPL: 14 (ref 10–24)
CALCIUM SERPL-MCNC: 8.9 MG/DL (ref 8.6–10.2)
CHLORIDE SERPL-SCNC: 109 MMOL/L (ref 96–106)
CHOLEST SERPL-MCNC: 109 MG/DL (ref 100–199)
CO2 SERPL-SCNC: 18 MMOL/L (ref 20–29)
CREAT SERPL-MCNC: 0.97 MG/DL (ref 0.76–1.27)
EGFRCR SERPLBLD CKD-EPI 2021: 80 ML/MIN/1.73
EOSINOPHIL # BLD AUTO: 0.4 X10E3/UL (ref 0–0.4)
EOSINOPHIL NFR BLD AUTO: 7 %
ERYTHROCYTE [DISTWIDTH] IN BLOOD BY AUTOMATED COUNT: 13.1 % (ref 11.6–15.4)
GLOBULIN SER CALC-MCNC: 2.3 G/DL (ref 1.5–4.5)
GLUCOSE SERPL-MCNC: 101 MG/DL (ref 70–99)
HBA1C MFR BLD: 6.3 % (ref 4.8–5.6)
HCT VFR BLD AUTO: 50.3 % (ref 37.5–51)
HDLC SERPL-MCNC: 33 MG/DL
HGB BLD-MCNC: 15.9 G/DL (ref 13–17.7)
IMM GRANULOCYTES # BLD AUTO: 0 X10E3/UL (ref 0–0.1)
IMM GRANULOCYTES NFR BLD AUTO: 0 %
LDLC SERPL CALC-MCNC: 58 MG/DL (ref 0–99)
LDLC/HDLC SERPL: 1.8 RATIO (ref 0–3.6)
LYMPHOCYTES # BLD AUTO: 1.5 X10E3/UL (ref 0.7–3.1)
LYMPHOCYTES NFR BLD AUTO: 26 %
MCH RBC QN AUTO: 29.3 PG (ref 26.6–33)
MCHC RBC AUTO-ENTMCNC: 31.6 G/DL (ref 31.5–35.7)
MCV RBC AUTO: 93 FL (ref 79–97)
MONOCYTES # BLD AUTO: 0.8 X10E3/UL (ref 0.1–0.9)
MONOCYTES NFR BLD AUTO: 14 %
NEUTROPHILS # BLD AUTO: 3 X10E3/UL (ref 1.4–7)
NEUTROPHILS NFR BLD AUTO: 52 %
PLATELET # BLD AUTO: 165 X10E3/UL (ref 150–450)
POTASSIUM SERPL-SCNC: 4.7 MMOL/L (ref 3.5–5.2)
PROT SERPL-MCNC: 6.3 G/DL (ref 6–8.5)
RBC # BLD AUTO: 5.42 X10E6/UL (ref 4.14–5.8)
SODIUM SERPL-SCNC: 142 MMOL/L (ref 134–144)
T3 SERPL-MCNC: 138 NG/DL (ref 71–180)
T4 FREE SERPL-MCNC: 1.1 NG/DL (ref 0.82–1.77)
TRIGL SERPL-MCNC: 91 MG/DL (ref 0–149)
TSH SERPL DL<=0.005 MIU/L-ACNC: 6.73 UIU/ML (ref 0.45–4.5)
VLDLC SERPL CALC-MCNC: 18 MG/DL (ref 5–40)
WBC # BLD AUTO: 5.9 X10E3/UL (ref 3.4–10.8)

## 2025-08-26 RX ORDER — ISOSORBIDE MONONITRATE 60 MG/1
120 TABLET, EXTENDED RELEASE ORAL DAILY
Qty: 90 TABLET | Refills: 1 | Status: SHIPPED | OUTPATIENT
Start: 2025-08-26

## 2025-08-26 RX ORDER — AMLODIPINE BESYLATE 5 MG/1
5 TABLET ORAL DAILY
Qty: 90 TABLET | Refills: 1 | Status: SHIPPED | OUTPATIENT
Start: 2025-08-26

## 2025-08-27 RX ORDER — LEVOTHYROXINE SODIUM 50 UG/1
50 TABLET ORAL
Qty: 90 TABLET | Refills: 0 | Status: SHIPPED | OUTPATIENT
Start: 2025-08-27

## (undated) DEVICE — CATH DIAG IMPULSE FR4 6F 100CM

## (undated) DEVICE — GLIDESHEATH SLENDER ACCESS KIT: Brand: GLIDESHEATH SLENDER

## (undated) DEVICE — DGW .035 FC J3MM 260CM TEF: Brand: EMERALD

## (undated) DEVICE — PK TRY HEART CATH 50

## (undated) DEVICE — GW PTFE EMERALD HEPCOAT FC J TIP STD .035 3MM 150CM

## (undated) DEVICE — CATH DIAG IMPULSE PIG .056 6F 110CM

## (undated) DEVICE — PINNACLE INTRODUCER SHEATH: Brand: PINNACLE

## (undated) DEVICE — CATH DIAG IMPULSE FL3.5 6F 100CM

## (undated) DEVICE — CATH DIAG IMPULSE FL4 6F 100CM

## (undated) DEVICE — ELECTRD DEFIB M/FUNC PROPADZ RADIOL 2PK